# Patient Record
Sex: MALE | Race: WHITE | NOT HISPANIC OR LATINO | Employment: FULL TIME | URBAN - METROPOLITAN AREA
[De-identification: names, ages, dates, MRNs, and addresses within clinical notes are randomized per-mention and may not be internally consistent; named-entity substitution may affect disease eponyms.]

---

## 2017-07-18 ENCOUNTER — ALLSCRIPTS OFFICE VISIT (OUTPATIENT)
Dept: OTHER | Facility: OTHER | Age: 37
End: 2017-07-18

## 2017-11-07 ENCOUNTER — HOSPITAL ENCOUNTER (INPATIENT)
Facility: HOSPITAL | Age: 37
LOS: 7 days | Discharge: HOME | DRG: 853 | End: 2017-11-14
Attending: EMERGENCY MEDICINE | Admitting: SURGERY
Payer: COMMERCIAL

## 2017-11-07 ENCOUNTER — APPOINTMENT (INPATIENT)
Dept: RADIOLOGY | Facility: HOSPITAL | Age: 37
DRG: 853 | End: 2017-11-07
Payer: COMMERCIAL

## 2017-11-07 ENCOUNTER — APPOINTMENT (EMERGENCY)
Dept: RADIOLOGY | Facility: HOSPITAL | Age: 37
DRG: 853 | End: 2017-11-07
Payer: COMMERCIAL

## 2017-11-07 DIAGNOSIS — D72.829 LEUKOCYTOSIS: ICD-10-CM

## 2017-11-07 DIAGNOSIS — K83.9 BILE LEAK: ICD-10-CM

## 2017-11-07 DIAGNOSIS — K81.0 ACUTE CHOLECYSTITIS: ICD-10-CM

## 2017-11-07 DIAGNOSIS — R11.2 NAUSEA & VOMITING: ICD-10-CM

## 2017-11-07 DIAGNOSIS — R10.9 ABDOMINAL PAIN: Primary | ICD-10-CM

## 2017-11-07 LAB
ANION GAP BLD CALC-SCNC: 21 MMOL/L (ref 4–13)
APTT PPP: 31 SECONDS (ref 23–35)
BASOPHILS # BLD AUTO: 0.02 THOUSANDS/ΜL (ref 0–0.1)
BASOPHILS NFR BLD AUTO: 0 % (ref 0–1)
BUN BLD-MCNC: 12 MG/DL (ref 5–25)
CA-I BLD-SCNC: 1.12 MMOL/L (ref 1.12–1.32)
CHLORIDE BLD-SCNC: 102 MMOL/L (ref 100–108)
CREAT BLD-MCNC: 0.8 MG/DL (ref 0.6–1.3)
EOSINOPHIL # BLD AUTO: 0.01 THOUSAND/ΜL (ref 0–0.61)
EOSINOPHIL NFR BLD AUTO: 0 % (ref 0–6)
ERYTHROCYTE [DISTWIDTH] IN BLOOD BY AUTOMATED COUNT: 12.9 % (ref 11.6–15.1)
GFR SERPL CREATININE-BSD FRML MDRD: 113 ML/MIN/1.73SQ M
GLUCOSE SERPL-MCNC: 170 MG/DL (ref 65–140)
HCT VFR BLD AUTO: 43.9 % (ref 36.5–49.3)
HCT VFR BLD CALC: 47 % (ref 36.5–49.3)
HGB BLD-MCNC: 16.4 G/DL (ref 12–17)
HGB BLDA-MCNC: 16 G/DL (ref 12–17)
INR PPP: 1.25 (ref 0.86–1.16)
LACTATE SERPL-SCNC: 1.6 MMOL/L (ref 0.5–2)
LACTATE SERPL-SCNC: 2.6 MMOL/L (ref 0.5–2)
LIPASE SERPL-CCNC: 62 U/L (ref 73–393)
LYMPHOCYTES # BLD AUTO: 1.03 THOUSANDS/ΜL (ref 0.6–4.47)
LYMPHOCYTES NFR BLD AUTO: 4 % (ref 14–44)
MCH RBC QN AUTO: 31 PG (ref 26.8–34.3)
MCHC RBC AUTO-ENTMCNC: 37.4 G/DL (ref 31.4–37.4)
MCV RBC AUTO: 83 FL (ref 82–98)
MONOCYTES # BLD AUTO: 2.2 THOUSAND/ΜL (ref 0.17–1.22)
MONOCYTES NFR BLD AUTO: 9 % (ref 4–12)
NEUTROPHILS # BLD AUTO: 20.96 THOUSANDS/ΜL (ref 1.85–7.62)
NEUTS SEG NFR BLD AUTO: 87 % (ref 43–75)
NRBC BLD AUTO-RTO: 0 /100 WBCS
PCO2 BLD: 22 MMOL/L (ref 21–32)
PLATELET # BLD AUTO: 272 THOUSANDS/UL (ref 149–390)
PMV BLD AUTO: 11.4 FL (ref 8.9–12.7)
POTASSIUM BLD-SCNC: 3.5 MMOL/L (ref 3.5–5.3)
PROTHROMBIN TIME: 15.8 SECONDS (ref 12.1–14.4)
RBC # BLD AUTO: 5.29 MILLION/UL (ref 3.88–5.62)
SODIUM BLD-SCNC: 141 MMOL/L (ref 136–145)
SPECIMEN SOURCE: ABNORMAL
SPECIMEN SOURCE: NORMAL
TROPONIN I BLD-MCNC: 0 NG/ML (ref 0–0.08)
WBC # BLD AUTO: 24.32 THOUSAND/UL (ref 4.31–10.16)

## 2017-11-07 PROCEDURE — 85025 COMPLETE CBC W/AUTO DIFF WBC: CPT | Performed by: EMERGENCY MEDICINE

## 2017-11-07 PROCEDURE — 83605 ASSAY OF LACTIC ACID: CPT | Performed by: EMERGENCY MEDICINE

## 2017-11-07 PROCEDURE — 96361 HYDRATE IV INFUSION ADD-ON: CPT

## 2017-11-07 PROCEDURE — 84484 ASSAY OF TROPONIN QUANT: CPT

## 2017-11-07 PROCEDURE — 83690 ASSAY OF LIPASE: CPT | Performed by: EMERGENCY MEDICINE

## 2017-11-07 PROCEDURE — 71020 HB CHEST X-RAY 2VW FRONTAL&LATL: CPT

## 2017-11-07 PROCEDURE — 74177 CT ABD & PELVIS W/CONTRAST: CPT

## 2017-11-07 PROCEDURE — 80053 COMPREHEN METABOLIC PANEL: CPT | Performed by: EMERGENCY MEDICINE

## 2017-11-07 PROCEDURE — 85014 HEMATOCRIT: CPT

## 2017-11-07 PROCEDURE — 93005 ELECTROCARDIOGRAM TRACING: CPT | Performed by: EMERGENCY MEDICINE

## 2017-11-07 PROCEDURE — 85610 PROTHROMBIN TIME: CPT | Performed by: EMERGENCY MEDICINE

## 2017-11-07 PROCEDURE — 96374 THER/PROPH/DIAG INJ IV PUSH: CPT

## 2017-11-07 PROCEDURE — 96375 TX/PRO/DX INJ NEW DRUG ADDON: CPT

## 2017-11-07 PROCEDURE — 36415 COLL VENOUS BLD VENIPUNCTURE: CPT

## 2017-11-07 PROCEDURE — 87040 BLOOD CULTURE FOR BACTERIA: CPT | Performed by: EMERGENCY MEDICINE

## 2017-11-07 PROCEDURE — 85730 THROMBOPLASTIN TIME PARTIAL: CPT | Performed by: EMERGENCY MEDICINE

## 2017-11-07 PROCEDURE — 80047 BASIC METABLC PNL IONIZED CA: CPT

## 2017-11-07 RX ORDER — OXYCODONE HYDROCHLORIDE 10 MG/1
10 TABLET ORAL EVERY 4 HOURS PRN
Status: DISCONTINUED | OUTPATIENT
Start: 2017-11-07 | End: 2017-11-14 | Stop reason: HOSPADM

## 2017-11-07 RX ORDER — ONDANSETRON 2 MG/ML
4 INJECTION INTRAMUSCULAR; INTRAVENOUS ONCE
Status: COMPLETED | OUTPATIENT
Start: 2017-11-07 | End: 2017-11-07

## 2017-11-07 RX ORDER — ACETAMINOPHEN 325 MG/1
650 TABLET ORAL EVERY 6 HOURS PRN
Status: DISCONTINUED | OUTPATIENT
Start: 2017-11-07 | End: 2017-11-14 | Stop reason: HOSPADM

## 2017-11-07 RX ORDER — OXYCODONE HYDROCHLORIDE 5 MG/1
5 TABLET ORAL EVERY 4 HOURS PRN
Status: DISCONTINUED | OUTPATIENT
Start: 2017-11-07 | End: 2017-11-14 | Stop reason: HOSPADM

## 2017-11-07 RX ORDER — MORPHINE SULFATE 10 MG/ML
6 INJECTION, SOLUTION INTRAMUSCULAR; INTRAVENOUS ONCE
Status: COMPLETED | OUTPATIENT
Start: 2017-11-07 | End: 2017-11-07

## 2017-11-07 RX ORDER — ONDANSETRON 2 MG/ML
4 INJECTION INTRAMUSCULAR; INTRAVENOUS EVERY 6 HOURS PRN
Status: DISCONTINUED | OUTPATIENT
Start: 2017-11-07 | End: 2017-11-14 | Stop reason: HOSPADM

## 2017-11-07 RX ORDER — ACETAMINOPHEN 325 MG/1
975 TABLET ORAL ONCE
Status: COMPLETED | OUTPATIENT
Start: 2017-11-07 | End: 2017-11-07

## 2017-11-07 RX ORDER — SODIUM CHLORIDE, SODIUM LACTATE, POTASSIUM CHLORIDE, CALCIUM CHLORIDE 600; 310; 30; 20 MG/100ML; MG/100ML; MG/100ML; MG/100ML
75 INJECTION, SOLUTION INTRAVENOUS CONTINUOUS
Status: DISCONTINUED | OUTPATIENT
Start: 2017-11-08 | End: 2017-11-11

## 2017-11-07 RX ADMIN — SODIUM CHLORIDE 1000 ML: 0.9 INJECTION, SOLUTION INTRAVENOUS at 23:31

## 2017-11-07 RX ADMIN — ONDANSETRON 4 MG: 2 INJECTION INTRAMUSCULAR; INTRAVENOUS at 21:35

## 2017-11-07 RX ADMIN — ACETAMINOPHEN 975 MG: 325 TABLET, FILM COATED ORAL at 23:37

## 2017-11-07 RX ADMIN — SODIUM CHLORIDE 1000 ML: 0.9 INJECTION, SOLUTION INTRAVENOUS at 21:27

## 2017-11-07 RX ADMIN — IOHEXOL 100 ML: 350 INJECTION, SOLUTION INTRAVENOUS at 22:29

## 2017-11-07 RX ADMIN — MORPHINE SULFATE 6 MG: 10 INJECTION, SOLUTION INTRAMUSCULAR; INTRAVENOUS at 21:32

## 2017-11-08 ENCOUNTER — ANESTHESIA EVENT (INPATIENT)
Dept: PERIOP | Facility: HOSPITAL | Age: 37
DRG: 853 | End: 2017-11-08
Payer: COMMERCIAL

## 2017-11-08 ENCOUNTER — ANESTHESIA (INPATIENT)
Dept: PERIOP | Facility: HOSPITAL | Age: 37
DRG: 853 | End: 2017-11-08
Payer: COMMERCIAL

## 2017-11-08 ENCOUNTER — APPOINTMENT (INPATIENT)
Dept: RADIOLOGY | Facility: HOSPITAL | Age: 37
DRG: 853 | End: 2017-11-08
Payer: COMMERCIAL

## 2017-11-08 LAB
ABO GROUP BLD: NORMAL
ALBUMIN SERPL BCP-MCNC: 3.1 G/DL (ref 3.5–5)
ALBUMIN SERPL BCP-MCNC: 3.9 G/DL (ref 3.5–5)
ALP SERPL-CCNC: 50 U/L (ref 46–116)
ALP SERPL-CCNC: 56 U/L (ref 46–116)
ALT SERPL W P-5'-P-CCNC: 14 U/L (ref 12–78)
ALT SERPL W P-5'-P-CCNC: 17 U/L (ref 12–78)
ANION GAP SERPL CALCULATED.3IONS-SCNC: 12 MMOL/L (ref 4–13)
ANION GAP SERPL CALCULATED.3IONS-SCNC: 9 MMOL/L (ref 4–13)
AST SERPL W P-5'-P-CCNC: 13 U/L (ref 5–45)
AST SERPL W P-5'-P-CCNC: 14 U/L (ref 5–45)
ATRIAL RATE: 118 BPM
BACTERIA UR QL AUTO: ABNORMAL /HPF
BASOPHILS # BLD AUTO: 0.02 THOUSANDS/ΜL (ref 0–0.1)
BASOPHILS NFR BLD AUTO: 0 % (ref 0–1)
BILIRUB SERPL-MCNC: 1.61 MG/DL (ref 0.2–1)
BILIRUB SERPL-MCNC: 1.65 MG/DL (ref 0.2–1)
BILIRUB UR QL STRIP: ABNORMAL
BLD GP AB SCN SERPL QL: NEGATIVE
BUN SERPL-MCNC: 11 MG/DL (ref 5–25)
BUN SERPL-MCNC: 12 MG/DL (ref 5–25)
CALCIUM SERPL-MCNC: 8.6 MG/DL (ref 8.3–10.1)
CALCIUM SERPL-MCNC: 9.1 MG/DL (ref 8.3–10.1)
CHLORIDE SERPL-SCNC: 105 MMOL/L (ref 100–108)
CHLORIDE SERPL-SCNC: 105 MMOL/L (ref 100–108)
CLARITY UR: CLEAR
CO2 SERPL-SCNC: 22 MMOL/L (ref 21–32)
CO2 SERPL-SCNC: 26 MMOL/L (ref 21–32)
COLOR UR: ABNORMAL
CREAT SERPL-MCNC: 0.99 MG/DL (ref 0.6–1.3)
CREAT SERPL-MCNC: 1.04 MG/DL (ref 0.6–1.3)
EOSINOPHIL # BLD AUTO: 0 THOUSAND/ΜL (ref 0–0.61)
EOSINOPHIL NFR BLD AUTO: 0 % (ref 0–6)
ERYTHROCYTE [DISTWIDTH] IN BLOOD BY AUTOMATED COUNT: 13 % (ref 11.6–15.1)
GFR SERPL CREATININE-BSD FRML MDRD: 90 ML/MIN/1.73SQ M
GFR SERPL CREATININE-BSD FRML MDRD: 96 ML/MIN/1.73SQ M
GLUCOSE SERPL-MCNC: 129 MG/DL (ref 65–140)
GLUCOSE SERPL-MCNC: 158 MG/DL (ref 65–140)
GLUCOSE UR STRIP-MCNC: NEGATIVE MG/DL
HCT VFR BLD AUTO: 42.5 % (ref 36.5–49.3)
HGB BLD-MCNC: 15.3 G/DL (ref 12–17)
HGB UR QL STRIP.AUTO: NEGATIVE
HYALINE CASTS #/AREA URNS LPF: ABNORMAL /LPF
KETONES UR STRIP-MCNC: NEGATIVE MG/DL
LACTATE SERPL-SCNC: 1.7 MMOL/L (ref 0.5–2)
LEUKOCYTE ESTERASE UR QL STRIP: ABNORMAL
LYMPHOCYTES # BLD AUTO: 1.07 THOUSANDS/ΜL (ref 0.6–4.47)
LYMPHOCYTES NFR BLD AUTO: 5 % (ref 14–44)
MCH RBC QN AUTO: 30.7 PG (ref 26.8–34.3)
MCHC RBC AUTO-ENTMCNC: 36 G/DL (ref 31.4–37.4)
MCV RBC AUTO: 85 FL (ref 82–98)
MONOCYTES # BLD AUTO: 1.62 THOUSAND/ΜL (ref 0.17–1.22)
MONOCYTES NFR BLD AUTO: 7 % (ref 4–12)
NEUTROPHILS # BLD AUTO: 19.73 THOUSANDS/ΜL (ref 1.85–7.62)
NEUTS SEG NFR BLD AUTO: 88 % (ref 43–75)
NITRITE UR QL STRIP: NEGATIVE
NON-SQ EPI CELLS URNS QL MICRO: ABNORMAL /HPF
NRBC BLD AUTO-RTO: 0 /100 WBCS
P AXIS: 60 DEGREES
PH UR STRIP.AUTO: 6 [PH] (ref 4.5–8)
PLATELET # BLD AUTO: 203 THOUSANDS/UL (ref 149–390)
PMV BLD AUTO: 11.5 FL (ref 8.9–12.7)
POTASSIUM SERPL-SCNC: 3.6 MMOL/L (ref 3.5–5.3)
POTASSIUM SERPL-SCNC: 4 MMOL/L (ref 3.5–5.3)
PR INTERVAL: 180 MS
PROT SERPL-MCNC: 7.1 G/DL (ref 6.4–8.2)
PROT SERPL-MCNC: 8.4 G/DL (ref 6.4–8.2)
PROT UR STRIP-MCNC: ABNORMAL MG/DL
QRS AXIS: 47 DEGREES
QRSD INTERVAL: 80 MS
QT INTERVAL: 314 MS
QTC INTERVAL: 440 MS
RBC # BLD AUTO: 4.98 MILLION/UL (ref 3.88–5.62)
RBC #/AREA URNS AUTO: ABNORMAL /HPF
RH BLD: POSITIVE
SODIUM SERPL-SCNC: 139 MMOL/L (ref 136–145)
SODIUM SERPL-SCNC: 140 MMOL/L (ref 136–145)
SP GR UR STRIP.AUTO: >1.045 (ref 1–1.03)
SPECIMEN EXPIRATION DATE: NORMAL
T WAVE AXIS: 59 DEGREES
UROBILINOGEN UR QL STRIP.AUTO: 2 E.U./DL
VENTRICULAR RATE: 118 BPM
WBC # BLD AUTO: 22.55 THOUSAND/UL (ref 4.31–10.16)
WBC #/AREA URNS AUTO: ABNORMAL /HPF

## 2017-11-08 PROCEDURE — 87075 CULTR BACTERIA EXCEPT BLOOD: CPT | Performed by: SURGERY

## 2017-11-08 PROCEDURE — 81001 URINALYSIS AUTO W/SCOPE: CPT | Performed by: EMERGENCY MEDICINE

## 2017-11-08 PROCEDURE — 86901 BLOOD TYPING SEROLOGIC RH(D): CPT | Performed by: SURGERY

## 2017-11-08 PROCEDURE — 71010 HB CHEST X-RAY 1 VIEW FRONTAL (PORTABLE): CPT

## 2017-11-08 PROCEDURE — 0DNU0ZZ RELEASE OMENTUM, OPEN APPROACH: ICD-10-PCS | Performed by: SURGERY

## 2017-11-08 PROCEDURE — 86900 BLOOD TYPING SEROLOGIC ABO: CPT | Performed by: SURGERY

## 2017-11-08 PROCEDURE — 87147 CULTURE TYPE IMMUNOLOGIC: CPT | Performed by: SURGERY

## 2017-11-08 PROCEDURE — 0FJ44ZZ INSPECTION OF GALLBLADDER, PERCUTANEOUS ENDOSCOPIC APPROACH: ICD-10-PCS | Performed by: SURGERY

## 2017-11-08 PROCEDURE — 85025 COMPLETE CBC W/AUTO DIFF WBC: CPT | Performed by: SURGERY

## 2017-11-08 PROCEDURE — 87070 CULTURE OTHR SPECIMN AEROBIC: CPT | Performed by: SURGERY

## 2017-11-08 PROCEDURE — 0WQF0ZZ REPAIR ABDOMINAL WALL, OPEN APPROACH: ICD-10-PCS | Performed by: SURGERY

## 2017-11-08 PROCEDURE — 0FB40ZZ EXCISION OF GALLBLADDER, OPEN APPROACH: ICD-10-PCS | Performed by: SURGERY

## 2017-11-08 PROCEDURE — 94762 N-INVAS EAR/PLS OXIMTRY CONT: CPT

## 2017-11-08 PROCEDURE — 99285 EMERGENCY DEPT VISIT HI MDM: CPT

## 2017-11-08 PROCEDURE — 80053 COMPREHEN METABOLIC PANEL: CPT | Performed by: SURGERY

## 2017-11-08 PROCEDURE — 87205 SMEAR GRAM STAIN: CPT | Performed by: SURGERY

## 2017-11-08 PROCEDURE — 88304 TISSUE EXAM BY PATHOLOGIST: CPT | Performed by: SURGERY

## 2017-11-08 PROCEDURE — 86850 RBC ANTIBODY SCREEN: CPT | Performed by: SURGERY

## 2017-11-08 PROCEDURE — 0F9800Z DRAINAGE OF CYSTIC DUCT WITH DRAINAGE DEVICE, OPEN APPROACH: ICD-10-PCS | Performed by: SURGERY

## 2017-11-08 PROCEDURE — 83605 ASSAY OF LACTIC ACID: CPT | Performed by: SURGERY

## 2017-11-08 RX ORDER — ALBUMIN, HUMAN INJ 5% 5 %
SOLUTION INTRAVENOUS CONTINUOUS PRN
Status: DISCONTINUED | OUTPATIENT
Start: 2017-11-08 | End: 2017-11-08 | Stop reason: SURG

## 2017-11-08 RX ORDER — SODIUM CHLORIDE, SODIUM LACTATE, POTASSIUM CHLORIDE, CALCIUM CHLORIDE 600; 310; 30; 20 MG/100ML; MG/100ML; MG/100ML; MG/100ML
100 INJECTION, SOLUTION INTRAVENOUS CONTINUOUS
Status: DISCONTINUED | OUTPATIENT
Start: 2017-11-08 | End: 2017-11-08

## 2017-11-08 RX ORDER — FENTANYL CITRATE 50 UG/ML
INJECTION, SOLUTION INTRAMUSCULAR; INTRAVENOUS AS NEEDED
Status: DISCONTINUED | OUTPATIENT
Start: 2017-11-08 | End: 2017-11-08 | Stop reason: SURG

## 2017-11-08 RX ORDER — FENTANYL CITRATE/PF 50 MCG/ML
25 SYRINGE (ML) INJECTION
Status: DISCONTINUED | OUTPATIENT
Start: 2017-11-08 | End: 2017-11-08 | Stop reason: HOSPADM

## 2017-11-08 RX ORDER — SUCCINYLCHOLINE CHLORIDE 20 MG/ML
INJECTION INTRAMUSCULAR; INTRAVENOUS AS NEEDED
Status: DISCONTINUED | OUTPATIENT
Start: 2017-11-08 | End: 2017-11-08 | Stop reason: SURG

## 2017-11-08 RX ORDER — PROPOFOL 10 MG/ML
INJECTION, EMULSION INTRAVENOUS AS NEEDED
Status: DISCONTINUED | OUTPATIENT
Start: 2017-11-08 | End: 2017-11-08 | Stop reason: SURG

## 2017-11-08 RX ORDER — CIPROFLOXACIN 2 MG/ML
400 INJECTION, SOLUTION INTRAVENOUS EVERY 12 HOURS
Status: COMPLETED | OUTPATIENT
Start: 2017-11-08 | End: 2017-11-13

## 2017-11-08 RX ORDER — ROCURONIUM BROMIDE 10 MG/ML
INJECTION, SOLUTION INTRAVENOUS AS NEEDED
Status: DISCONTINUED | OUTPATIENT
Start: 2017-11-08 | End: 2017-11-08 | Stop reason: SURG

## 2017-11-08 RX ORDER — METHOCARBAMOL 500 MG/1
500 TABLET, FILM COATED ORAL EVERY 6 HOURS SCHEDULED
Status: DISCONTINUED | OUTPATIENT
Start: 2017-11-08 | End: 2017-11-14 | Stop reason: HOSPADM

## 2017-11-08 RX ORDER — PROMETHAZINE HYDROCHLORIDE 25 MG/ML
12.5 INJECTION, SOLUTION INTRAMUSCULAR; INTRAVENOUS ONCE AS NEEDED
Status: DISCONTINUED | OUTPATIENT
Start: 2017-11-08 | End: 2017-11-08 | Stop reason: HOSPADM

## 2017-11-08 RX ORDER — LIDOCAINE 50 MG/G
1 PATCH TOPICAL DAILY
Status: DISCONTINUED | OUTPATIENT
Start: 2017-11-08 | End: 2017-11-13 | Stop reason: SDUPTHER

## 2017-11-08 RX ORDER — MIDAZOLAM HYDROCHLORIDE 1 MG/ML
INJECTION INTRAMUSCULAR; INTRAVENOUS AS NEEDED
Status: DISCONTINUED | OUTPATIENT
Start: 2017-11-08 | End: 2017-11-08 | Stop reason: SURG

## 2017-11-08 RX ORDER — LIDOCAINE HYDROCHLORIDE 10 MG/ML
INJECTION, SOLUTION INFILTRATION; PERINEURAL AS NEEDED
Status: DISCONTINUED | OUTPATIENT
Start: 2017-11-08 | End: 2017-11-08 | Stop reason: SURG

## 2017-11-08 RX ORDER — ONDANSETRON 2 MG/ML
4 INJECTION INTRAMUSCULAR; INTRAVENOUS ONCE AS NEEDED
Status: DISCONTINUED | OUTPATIENT
Start: 2017-11-08 | End: 2017-11-08 | Stop reason: HOSPADM

## 2017-11-08 RX ORDER — MAGNESIUM HYDROXIDE 1200 MG/15ML
LIQUID ORAL AS NEEDED
Status: DISCONTINUED | OUTPATIENT
Start: 2017-11-08 | End: 2017-11-08 | Stop reason: HOSPADM

## 2017-11-08 RX ORDER — ONDANSETRON 2 MG/ML
INJECTION INTRAMUSCULAR; INTRAVENOUS AS NEEDED
Status: DISCONTINUED | OUTPATIENT
Start: 2017-11-08 | End: 2017-11-08 | Stop reason: SURG

## 2017-11-08 RX ORDER — CLINDAMYCIN PHOSPHATE 900 MG/50ML
900 INJECTION INTRAVENOUS
Status: COMPLETED | OUTPATIENT
Start: 2017-11-08 | End: 2017-11-08

## 2017-11-08 RX ORDER — ESMOLOL HYDROCHLORIDE 10 MG/ML
INJECTION INTRAVENOUS AS NEEDED
Status: DISCONTINUED | OUTPATIENT
Start: 2017-11-08 | End: 2017-11-08 | Stop reason: SURG

## 2017-11-08 RX ORDER — SODIUM CHLORIDE 9 MG/ML
INJECTION, SOLUTION INTRAVENOUS CONTINUOUS PRN
Status: DISCONTINUED | OUTPATIENT
Start: 2017-11-08 | End: 2017-11-08

## 2017-11-08 RX ORDER — ROPIVACAINE HYDROCHLORIDE 5 MG/ML
INJECTION, SOLUTION EPIDURAL; INFILTRATION; PERINEURAL AS NEEDED
Status: DISCONTINUED | OUTPATIENT
Start: 2017-11-08 | End: 2017-11-08 | Stop reason: SURG

## 2017-11-08 RX ORDER — METOCLOPRAMIDE HYDROCHLORIDE 5 MG/ML
INJECTION INTRAMUSCULAR; INTRAVENOUS AS NEEDED
Status: DISCONTINUED | OUTPATIENT
Start: 2017-11-08 | End: 2017-11-08 | Stop reason: SURG

## 2017-11-08 RX ORDER — SODIUM CHLORIDE 9 MG/ML
INJECTION, SOLUTION INTRAVENOUS CONTINUOUS PRN
Status: DISCONTINUED | OUTPATIENT
Start: 2017-11-08 | End: 2017-11-08 | Stop reason: SURG

## 2017-11-08 RX ORDER — METOCLOPRAMIDE HYDROCHLORIDE 5 MG/ML
10 INJECTION INTRAMUSCULAR; INTRAVENOUS ONCE AS NEEDED
Status: DISCONTINUED | OUTPATIENT
Start: 2017-11-08 | End: 2017-11-08 | Stop reason: HOSPADM

## 2017-11-08 RX ORDER — GLYCOPYRROLATE 0.2 MG/ML
INJECTION INTRAMUSCULAR; INTRAVENOUS AS NEEDED
Status: DISCONTINUED | OUTPATIENT
Start: 2017-11-08 | End: 2017-11-08 | Stop reason: SURG

## 2017-11-08 RX ADMIN — PROPOFOL 250 MG: 10 INJECTION, EMULSION INTRAVENOUS at 12:08

## 2017-11-08 RX ADMIN — METOCLOPRAMIDE 10 MG: 5 INJECTION, SOLUTION INTRAMUSCULAR; INTRAVENOUS at 12:15

## 2017-11-08 RX ADMIN — SODIUM CHLORIDE, SODIUM LACTATE, POTASSIUM CHLORIDE, AND CALCIUM CHLORIDE 125 ML/HR: .6; .31; .03; .02 INJECTION, SOLUTION INTRAVENOUS at 01:03

## 2017-11-08 RX ADMIN — FENTANYL CITRATE 50 MCG: 50 INJECTION, SOLUTION INTRAMUSCULAR; INTRAVENOUS at 12:04

## 2017-11-08 RX ADMIN — SUCCINYLCHOLINE CHLORIDE 140 MG: 20 INJECTION, SOLUTION INTRAMUSCULAR; INTRAVENOUS at 12:11

## 2017-11-08 RX ADMIN — CEFAZOLIN SODIUM 2000 MG: 2 SOLUTION INTRAVENOUS at 00:44

## 2017-11-08 RX ADMIN — ONDANSETRON 4 MG: 2 INJECTION INTRAMUSCULAR; INTRAVENOUS at 11:53

## 2017-11-08 RX ADMIN — CIPROFLOXACIN 400 MG: 2 INJECTION, SOLUTION INTRAVENOUS at 20:08

## 2017-11-08 RX ADMIN — CLINDAMYCIN PHOSPHATE 900 MG: 18 INJECTION, SOLUTION INTRAMUSCULAR; INTRAVENOUS at 12:00

## 2017-11-08 RX ADMIN — FENTANYL CITRATE 50 MCG: 50 INJECTION, SOLUTION INTRAMUSCULAR; INTRAVENOUS at 12:06

## 2017-11-08 RX ADMIN — DEXAMETHASONE SODIUM PHOSPHATE 10 MG: 10 INJECTION INTRAMUSCULAR; INTRAVENOUS at 12:15

## 2017-11-08 RX ADMIN — CIPROFLOXACIN 400 MG: 2 INJECTION, SOLUTION INTRAVENOUS at 08:14

## 2017-11-08 RX ADMIN — SODIUM CHLORIDE, SODIUM LACTATE, POTASSIUM CHLORIDE, AND CALCIUM CHLORIDE 1000 ML: .6; .31; .03; .02 INJECTION, SOLUTION INTRAVENOUS at 10:01

## 2017-11-08 RX ADMIN — FENTANYL CITRATE 50 MCG: 50 INJECTION, SOLUTION INTRAMUSCULAR; INTRAVENOUS at 13:28

## 2017-11-08 RX ADMIN — HYDROMORPHONE HYDROCHLORIDE 0.5 MG: 1 INJECTION, SOLUTION INTRAMUSCULAR; INTRAVENOUS; SUBCUTANEOUS at 07:35

## 2017-11-08 RX ADMIN — ESMOLOL HYDROCHLORIDE 30 MG: 100 INJECTION, SOLUTION INTRAVENOUS at 12:29

## 2017-11-08 RX ADMIN — SODIUM CHLORIDE, SODIUM LACTATE, POTASSIUM CHLORIDE, AND CALCIUM CHLORIDE: .6; .31; .03; .02 INJECTION, SOLUTION INTRAVENOUS at 13:27

## 2017-11-08 RX ADMIN — ALBUMIN HUMAN: 0.05 INJECTION, SOLUTION INTRAVENOUS at 12:59

## 2017-11-08 RX ADMIN — ROCURONIUM BROMIDE 30 MG: 10 INJECTION INTRAVENOUS at 12:10

## 2017-11-08 RX ADMIN — LIDOCAINE 1 PATCH: 50 PATCH CUTANEOUS at 17:26

## 2017-11-08 RX ADMIN — METHOCARBAMOL 500 MG: 500 TABLET ORAL at 17:26

## 2017-11-08 RX ADMIN — METRONIDAZOLE 500 MG: 500 INJECTION, SOLUTION INTRAVENOUS at 13:51

## 2017-11-08 RX ADMIN — HYDROMORPHONE HYDROCHLORIDE 0.5 MG: 1 INJECTION, SOLUTION INTRAMUSCULAR; INTRAVENOUS; SUBCUTANEOUS at 15:02

## 2017-11-08 RX ADMIN — LIDOCAINE HYDROCHLORIDE 100 MG: 10 INJECTION, SOLUTION INFILTRATION; PERINEURAL at 12:08

## 2017-11-08 RX ADMIN — ROPIVACAINE HYDROCHLORIDE 30 ML: 5 INJECTION, SOLUTION EPIDURAL; INFILTRATION; PERINEURAL at 14:44

## 2017-11-08 RX ADMIN — OXYCODONE HYDROCHLORIDE 5 MG: 5 TABLET ORAL at 05:13

## 2017-11-08 RX ADMIN — FENTANYL CITRATE 25 MCG: 50 INJECTION, SOLUTION INTRAMUSCULAR; INTRAVENOUS at 15:50

## 2017-11-08 RX ADMIN — FENTANYL CITRATE 50 MCG: 50 INJECTION, SOLUTION INTRAMUSCULAR; INTRAVENOUS at 13:06

## 2017-11-08 RX ADMIN — SODIUM CHLORIDE, SODIUM LACTATE, POTASSIUM CHLORIDE, AND CALCIUM CHLORIDE: .6; .31; .03; .02 INJECTION, SOLUTION INTRAVENOUS at 11:55

## 2017-11-08 RX ADMIN — GLYCOPYRROLATE 0.2 MG: 0.2 INJECTION, SOLUTION INTRAMUSCULAR; INTRAVENOUS at 11:53

## 2017-11-08 RX ADMIN — HYDROMORPHONE HYDROCHLORIDE 0.5 MG: 1 INJECTION, SOLUTION INTRAMUSCULAR; INTRAVENOUS; SUBCUTANEOUS at 14:16

## 2017-11-08 RX ADMIN — METRONIDAZOLE 500 MG: 500 INJECTION, SOLUTION INTRAVENOUS at 17:26

## 2017-11-08 RX ADMIN — ROCURONIUM BROMIDE 30 MG: 10 INJECTION INTRAVENOUS at 12:40

## 2017-11-08 RX ADMIN — METRONIDAZOLE 500 MG: 500 INJECTION, SOLUTION INTRAVENOUS at 23:58

## 2017-11-08 RX ADMIN — FENTANYL CITRATE 25 MCG: 50 INJECTION, SOLUTION INTRAMUSCULAR; INTRAVENOUS at 15:12

## 2017-11-08 RX ADMIN — METHOCARBAMOL 500 MG: 500 TABLET ORAL at 23:21

## 2017-11-08 RX ADMIN — GLYCOPYRROLATE 0.8 MG: 0.2 INJECTION, SOLUTION INTRAMUSCULAR; INTRAVENOUS at 14:47

## 2017-11-08 RX ADMIN — ESMOLOL HYDROCHLORIDE 30 MG: 100 INJECTION, SOLUTION INTRAVENOUS at 14:47

## 2017-11-08 RX ADMIN — FENTANYL CITRATE 25 MCG: 50 INJECTION, SOLUTION INTRAMUSCULAR; INTRAVENOUS at 15:09

## 2017-11-08 RX ADMIN — SODIUM CHLORIDE: 0.9 INJECTION, SOLUTION INTRAVENOUS at 12:17

## 2017-11-08 RX ADMIN — HYDROMORPHONE HYDROCHLORIDE 0.5 MG: 1 INJECTION, SOLUTION INTRAMUSCULAR; INTRAVENOUS; SUBCUTANEOUS at 01:02

## 2017-11-08 RX ADMIN — FENTANYL CITRATE 25 MCG: 50 INJECTION, SOLUTION INTRAMUSCULAR; INTRAVENOUS at 15:41

## 2017-11-08 RX ADMIN — SODIUM CHLORIDE 1000 ML: 0.9 INJECTION, SOLUTION INTRAVENOUS at 00:44

## 2017-11-08 RX ADMIN — SODIUM CHLORIDE: 0.9 INJECTION, SOLUTION INTRAVENOUS at 12:45

## 2017-11-08 RX ADMIN — METRONIDAZOLE 500 MG: 500 INJECTION, SOLUTION INTRAVENOUS at 07:37

## 2017-11-08 RX ADMIN — SODIUM CHLORIDE, SODIUM LACTATE, POTASSIUM CHLORIDE, AND CALCIUM CHLORIDE 125 ML/HR: .6; .31; .03; .02 INJECTION, SOLUTION INTRAVENOUS at 11:05

## 2017-11-08 RX ADMIN — METRONIDAZOLE 500 MG: 500 INJECTION, SOLUTION INTRAVENOUS at 01:08

## 2017-11-08 RX ADMIN — MIDAZOLAM HYDROCHLORIDE 2 MG: 1 INJECTION, SOLUTION INTRAMUSCULAR; INTRAVENOUS at 11:53

## 2017-11-08 RX ADMIN — NEOSTIGMINE METHYLSULFATE 4 MG: 1 INJECTION, SOLUTION INTRAMUSCULAR; INTRAVENOUS; SUBCUTANEOUS at 14:47

## 2017-11-08 NOTE — ANESTHESIA PREPROCEDURE EVALUATION
Review of Systems/Medical History  Patient summary reviewed  Chart reviewed  No history of anesthetic complications     Cardiovascular  Negative cardio ROS Exercise tolerance: good,     Pulmonary  Negative pulmonary ROS ,        GI/Hepatic  Negative GI/hepatic ROS          Negative  ROS        Endo/Other  Negative endo/other ROS      GYN  Negative gynecology ROS          Hematology  Negative hematology ROS      Musculoskeletal  Negative musculoskeletal ROS        Neurology  Negative neurology ROS      Psychology   Negative psychology ROS            Physical Exam    Airway    Mallampati score: I    Neck ROM: full     Dental   No notable dental hx     Cardiovascular  Comment: Negative ROS,     Pulmonary      Other Findings        Anesthesia Plan  ASA Score- 1 Emergent      Anesthesia Type- general with ASA Monitors  Additional Monitors:   Airway Plan: ETT  Comment: TAP block if open surgery is necessary  Induction- intravenous  Informed Consent- Anesthetic plan and risks discussed with patient  I personally reviewed this patient with the CRNA  Discussed and agreed on the Anesthesia Plan with the CRNA  Neeraj Olivier

## 2017-11-08 NOTE — PLAN OF CARE
DISCHARGE PLANNING     Discharge to home or other facility with appropriate resources Progressing        GASTROINTESTINAL - ADULT     Minimal or absence of nausea and/or vomiting Progressing     Maintains or returns to baseline bowel function Progressing     Maintains adequate nutritional intake Progressing        INFECTION - ADULT     Absence or prevention of progression during hospitalization Progressing        PAIN - ADULT     Verbalizes/displays adequate comfort level or baseline comfort level Progressing        SAFETY ADULT     Patient will remain free of falls Progressing     Maintain or return to baseline ADL function Progressing

## 2017-11-08 NOTE — ED ATTENDING ATTESTATION
Hernandez Hollis MD, saw and evaluated the patient  All available labs and X-rays were ordered by me or the resident and have been reviewed by myself  I discussed the patient with the resident / non-physician and agree with the resident's / non-physician practitioner's findings and plan as documented in the resident's / non-physician practicitioner's note, except where noted  At this point, I agree with the current assessment done in the ED  Chief Complaint   Patient presents with    Chest Pain     per patient, "sunday I staretd not feeling good, my belly wasn't feeling good  and then it went away, then monday i started not feeling good again and had a hard time sleeping last night  Woke up this morning, threw up at work, went home and my chest hurts  i have a hard time catching a full breath " denies n/v/d   Abdominal Pain     This is a 42year old male presenting for likely surgical belly pain  He noted that on Sunday he started to not feel well  Monday had felt worse with diffuse belly pain  He went to work and couldn't tolerate it toda, vomiting once  Poor oral intake / anorexia  +nausea  +vomiting  The pain is worse when going over speed bumps (brought up by patient himself)  Denies any urinary tract infection symptoms (burning, itching, pain, blood, frequency)  Denies any upper respiratory tract infection symptoms (cough, congestion, rhinorrhea, sore throat)  Denies similar in the past  No sick contacts  PE:  Vitals:    11/10/17 1356 11/10/17 1402 11/10/17 1421 11/10/17 1500   BP: 154/88 146/86 146/89 124/76   Pulse: 90 88 81 77   Resp: 16 16 18 18   Temp: 98 9 °F (37 2 °C)  98 9 °F (37 2 °C) 98 6 °F (37 °C)   TempSrc: Tympanic  Oral Oral   SpO2: 94% 93% 96% 97%   Weight:       Height:       General: VS reviewed  Appears in NAD  awake, alert  Well-nourished, well-developed  Appears stated age  Speaking normally in full sentences  Head: Normocephalic, atraumatic, nontender    Eyes: EOM-I  No diplopia  No hyphema  No subconjunctival hemorrhages  Symmetrical lids  ENT: Atraumatic external nose and ears  MMM  No malocclusion  No stridor  Normal phonation  No drooling  Normal swallowing  Neck: No JVD  CV: No pallor noted  Peripheral pulses +2 throughout  No chest wall tenderness  Lungs:   No tachypnea  No respiratory distress  Abd: severe tenderness diffusely in abdomen, maximal in the RUQ  +psoas  +obturator sign  +heel strike sign  No CVAT  Very firm abdomen  MSK:   FROM   Skin: Dry, intact  Neuro: Awake, alert, GCS15, CN II-XII grossly intact  Motor grossly intact  Psychiatric/Behavioral: Appropriate mood and affect   Exam: deferred  A:  - Belly pain  P:  - Not surgical belly but very tender with rebound, unclear source  - Will do CT  - bedside u/s for cholecystitis  - dispo: surgical eval  - 13 point ROS was performed and all are normal unless stated in the history above  - Nursing note reviewed  Vitals reviewed  - Orders placed by myself and/or advanced practitioner / resident     - Previous chart was not reviewed  - No language barrier    - History obtained from patient  - There are no limitations to the history obtained  - Critical care time: Not applicable for this patient  Final Diagnosis:  1  Abdominal pain    2  Leukocytosis    3  Acute cholecystitis    4  Nausea & vomiting    5  Bile leak        ED Course as of Nov 10 1927   Tue Nov 07, 2017 2124 WBC: (!) 24 32   2143 I did a bedside U/S  I didn't get the greatest views however the GB looks enlarged, +wall thickening (0 6cm), and small amount of monica-cholecystic fluid  Will still do imaging b/c I didn't get good views  2145 Red surgical resident paged  Aware of patient  [facility]  Imaging pending  2331 LACTIC ACID: (!!) 2 6   2349 Discussed with surgery  Metronidazole + ancef  Likely surgery tomorrow         Medications   lactated ringers infusion ( Intravenous Anesthesia Volume Adjustment 11/10/17 1333)   ondansetron (ZOFRAN) injection 4 mg ( Intravenous MAR Unhold 11/8/17 1436)   HYDROmorphone (DILAUDID) 1 mg/mL injection 0 5 mg ( Intravenous MAR Unhold 11/8/17 1436)   oxyCODONE (ROXICODONE) IR tablet 5 mg (5 mg Oral Given 11/9/17 2005)   oxyCODONE (ROXICODONE) immediate release tablet 10 mg ( Oral MAR Unhold 11/8/17 1436)   acetaminophen (TYLENOL) tablet 650 mg ( Oral MAR Unhold 11/8/17 1436)   metroNIDAZOLE (FLAGYL) IVPB (premix) 500 mg (500 mg Intravenous New Bag 11/10/17 1650)   ciprofloxacin (CIPRO) IVPB (premix) 400 mg (400 mg Intravenous New Bag 11/10/17 1018)   lidocaine (LIDODERM) 5 % patch 1 patch (1 patch Transdermal Medication Applied 11/10/17 0823)   methocarbamol (ROBAXIN) tablet 500 mg (500 mg Oral Given 11/10/17 1851)   morphine (PF) 10 mg/mL injection 6 mg (6 mg Intravenous Given 11/7/17 2132)   ondansetron (ZOFRAN) injection 4 mg (4 mg Intravenous Given 11/7/17 2135)   sodium chloride 0 9 % bolus 1,000 mL (0 mL Intravenous Stopped 11/7/17 2327)   acetaminophen (TYLENOL) tablet 975 mg (975 mg Oral Given 11/7/17 2337)   iohexol (OMNIPAQUE) 350 MG/ML injection (MULTI-DOSE) 100 mL (100 mL Intravenous Given 11/7/17 2229)   sodium chloride 0 9 % bolus 1,000 mL (0 mL Intravenous Stopped 11/8/17 0100)   ceFAZolin (ANCEF) IVPB (premix) 2,000 mg (0 mg Intravenous Stopped 11/8/17 0204)   sodium chloride 0 9 % bolus 1,000 mL (0 mL Intravenous Stopped 11/8/17 1900)   clindamycin (CLEOCIN) IVPB (premix) 900 mg (900 mg Intravenous Given 11/8/17 1200)   lactated ringers bolus 1,000 mL (0 mL Intravenous Stopped 11/8/17 1900)   potassium chloride (K-DUR,KLOR-CON) CR tablet 40 mEq (40 mEq Oral Given 11/9/17 0904)     FL ERCP biliary only   Final Result      Please see procedure report for further details  Workstation performed: [id number]         XR chest portable   Final Result      Study somewhat limited by suboptimal inspiration  No acute pulmonary disease identified           Workstation performed: [id number]         X-ray chest 2 views   Final Result      No active pulmonary disease  Workstation performed: [id number]         CT abdomen pelvis w contrast   ED Interpretation   CT ordered by myself and the report from radiologist has been reviewed  Final Result         1  Findings suspicious for acute cholecystitis with inflammation extending into the mesentery  Moderate amount of ascites within the pelvis and tracking the paracolic gutters is noted  2  Mild wall thickening of the hepatic flexure and gastric antrum, likely reactive changes related to inflammation involving the gallbladder                 ##cfslh   I personally discussed this result with Dr Adelaida Ta on 11/7/2017 11:27 PM    ##         Workstation performed: [id number]           Orders Placed This Encounter   Procedures    Blood culture #1    Blood culture #2    Anaerobic culture and Gram stain    Body fluid culture and Gram stain    X-ray chest 2 views    CT abdomen pelvis w contrast    XR chest portable    FL ERCP biliary only    Comprehensive metabolic panel    CBC and differential    CBC and differential    Comprehensive metabolic panel    Lipase    APTT    Protime-INR    Lactic Acid x2    UA w Reflex to Microscopic w Reflex to Culture    Lactic acid, plasma    CBC and differential    Comprehensive metabolic panel    Urine Microscopic    CBC and differential    Comprehensive metabolic panel    Comprehensive metabolic panel    CBC and differential    Comprehensive metabolic panel    CBC and differential    CBC and differential    Comprehensive metabolic panel    Comprehensive metabolic panel    CBC and differential    Magnesium    Diet Surgical; Cl Liq Toast Crax; Cl Liq Toast Crax    POCT troponin    Notify physician if BP < 90    POCT chem 8 (CGB8)    Vital signs per unit routine    Notify physician    Up as tolerated    I/O    Maintain IV access    Encourage deep breathing and coughing    Incentive spirometry    Place sequential compression device    Drain to bulb suction    Intake and Output    Nursing Communication Maintain mac catheter, do NOT follow nurse driven protocol   Discontinue urinary catheter    Level 1-Full Code: all life saving measures are indicated    Inpatient consult to gastroenterology    Dietary nutrition supplements    EKG RESULTS    ECG 12 lead    ECG 12 lead    Type and screen    Insert peripheral IV    Inpatient Admission    Transfer patient    Transfer patient    Update level of care     Labs Reviewed   COMPREHENSIVE METABOLIC PANEL - Abnormal        Result Value Ref Range Status    Glucose 158 (*) 65 - 140 mg/dL Final    Comment:   If the patient is fasting, the ADA then defines impaired fasting glucose as > 100 mg/dL and diabetes as > or equal to 123 mg/dL  Specimen collection should occur prior to Sulfasalazine administration due to the potential for falsely depressed results  Specimen collection should occur prior to Sulfapyridine administration due to the potential for falsely elevated results  Total Protein 8 4 (*) 6 4 - 8 2 g/dL Final    Total Bilirubin 1 65 (*) 0 20 - 1 00 mg/dL Final    Sodium 139  136 - 145 mmol/L Final    Potassium 3 6  3 5 - 5 3 mmol/L Final    Chloride 105  100 - 108 mmol/L Final    CO2 22  21 - 32 mmol/L Final    Anion Gap 12  4 - 13 mmol/L Final    BUN 12  5 - 25 mg/dL Final    Creatinine 0 99  0 60 - 1 30 mg/dL Final    Comment: Standardized to IDMS reference method    Calcium 9 1  8 3 - 10 1 mg/dL Final    AST 14  5 - 45 U/L Final    Comment:   Specimen collection should occur prior to Sulfasalazine administration due to the potential for falsely depressed results  ALT 17  12 - 78 U/L Final    Comment:   Specimen collection should occur prior to Sulfasalazine and/or Sulfapyridine administration due to the potential for falsely depressed results       Alkaline Phosphatase 56  46 - 116 U/L Final    Albumin 3 9  3 5 - 5 0 g/dL Final    eGFR 96  ml/min/1 73sq m Final    Narrative:     National Kidney Disease Education Program recommendations are as follows:  GFR calculation is accurate only with a steady state creatinine  Chronic Kidney disease less than 60 ml/min/1 73 sq  meters  Kidney failure less than 15 ml/min/1 73 sq  meters  CBC AND DIFFERENTIAL - Abnormal     WBC 24 32 (*) 4 31 - 10 16 Thousand/uL Final    Neutrophils Relative 87 (*) 43 - 75 % Final    Lymphocytes Relative 4 (*) 14 - 44 % Final    Neutrophils Absolute 20 96 (*) 1 85 - 7 62 Thousands/µL Final    Monocytes Absolute 2 20 (*) 0 17 - 1 22 Thousand/µL Final    RBC 5 29  3 88 - 5 62 Million/uL Final    Hemoglobin 16 4  12 0 - 17 0 g/dL Final    Hematocrit 43 9  36 5 - 49 3 % Final    MCV 83  82 - 98 fL Final    MCH 31 0  26 8 - 34 3 pg Final    MCHC 37 4  31 4 - 37 4 g/dL Final    RDW 12 9  11 6 - 15 1 % Final    MPV 11 4  8 9 - 12 7 fL Final    Platelets 150  423 - 390 Thousands/uL Final    nRBC 0  /100 WBCs Final    Monocytes Relative 9  4 - 12 % Final    Eosinophils Relative 0  0 - 6 % Final    Basophils Relative 0  0 - 1 % Final    Lymphocytes Absolute 1 03  0 60 - 4 47 Thousands/µL Final    Eosinophils Absolute 0 01  0 00 - 0 61 Thousand/µL Final    Basophils Absolute 0 02  0 00 - 0 10 Thousands/µL Final   LIPASE - Abnormal     Lipase 62 (*) 73 - 393 u/L Final   PROTIME-INR - Abnormal     Protime 15 8 (*) 12 1 - 14 4 seconds Final    INR 1 25 (*) 0 86 - 1 16 Final   LACTIC ACID, PLASMA - Abnormal     LACTIC ACID 2 6 (*) 0 5 - 2 0 mmol/L Final    Narrative:     Result may be elevated if tourniquet was used during collection     POCT CHEM 8+ - Abnormal     Anion Gap, Istat 21 (*) 4 - 13 mmol/L Final    Glucose, i-STAT 170 (*) 65 - 140 mg/dl Final    SODIUM, I-STAT 141  136 - 145 mmol/l Final    Potassium, i-STAT 3 5  3 5 - 5 3 mmol/L Final    Chloride, istat 102  100 - 108 mmol/L Final    CO2, i-STAT 22  21 - 32 mmol/L Final Calcium, Ionized i-STAT 1 12  1 12 - 1 32 mmol/L Final    BUN, I-STAT 12  5 - 25 mg/dl Final    Creatinine, i-STAT 0 8  0 6 - 1 3 mg/dl Final    eGFR 113  ml/min/1 73sq m Final    Hct, i-STAT 47  36 5 - 49 3 % Final    Hgb, i-STAT 16 0  12 0 - 17 0 g/dl Final    Specimen Type VENOUS   Final   APTT - Normal    PTT 31  23 - 35 seconds Final    Narrative: Therapeutic Heparin Range = 60-90 seconds   LACTIC ACID, PLASMA - Normal    LACTIC ACID 1 6  0 5 - 2 0 mmol/L Final    Narrative:     Result may be elevated if tourniquet was used during collection  POCT TROPONIN - Normal    POC Troponin I 0 00  0 00 - 0 08 ng/ml Final    Specimen Type VENOUS   Final    Narrative:     [employer] i-Stat handheld analyzer 99% cutoff is > 0 08ng/mL in [facility]    o cTnI 99% cutoff is useful only when applied to patients in the clinical setting of myocardial ischemia  o cTnI 99% cutoff should be interpreted in the context of clinical history, ECG findings and possibly cardiac imaging to establish correct diagnosis  o cTnI 99% cutoff may be suggestive but clearly not indicative of a coronary event without the clinical setting of myocardial ischemia       Time reflects when diagnosis was documented in both MDM as applicable and the Disposition within this note     Time User Action Codes Description Comment    11/7/2017  9:48 PM Julian Moran Add [R10 9] Abdominal pain     11/7/2017  9:48 PM Julian Moran Add [D72 829] Leukocytosis     11/7/2017  9:48 PM Julian Moran Add [K81 0] Acute cholecystitis     11/7/2017  9:48 PM Julian Moran Add [R11 2] Nausea & vomiting     11/8/2017 12:55 PM Lc President HERMINIA Modify [K81 0] Acute cholecystitis     11/8/2017  2:37 PM Alisa AYALA Modify [K81 0] Acute cholecystitis     11/10/2017  7:57 AM Saqib Sanchez Add [K83 9] Bile leak       ED Disposition     ED Disposition Condition Comment    Admit  Case was discussed with Dr Georgiana Mcrae and the patient's admission status was agreed to be Admission Status: inpatient status to the service of Dr Silverio Yeimi   Follow-up Information    None       There are no discharge medications for this patient  No discharge procedures on file  None       Portions of the record may have been created with voice recognition software  Occasional wrong word or "sound a like" substitutions may have occurred due to the inherent limitations of voice recognition software  Read the chart carefully and recognize, using context, where substitutions have occurred      Electronically signed by:  Emile Ferrer

## 2017-11-08 NOTE — OP NOTE
OPERATIVE REPORT  PATIENT NAME: Ly Forde    :  1980  MRN: [id number]  Pt Location: [facility] OR ROOM 03    SURGERY DATE: 2017    Surgeon(s) and Role:     * Dl Chaney MD - Primary     * Kamaljit De Jesus MD - Assisting    Preop Diagnosis:  Acute cholecystitis [K81 0]    Post-Op Diagnosis Codes:     * Acute cholecystitis [K81 0]    Procedure(s) (LRB):  CHOLECYSTECTOMY LAPAROSCOPIC, CONVERTED TO OPEN CHOLECYSTECTOMY, REPAIR UMBLICAL HERNIA  AND LYSIS OF ADHESIONS (N/A)    Specimen(s):  ID Type Source Tests Collected by Time Destination   1 :  Tissue Gallbladder TISSUE EXAM Dl Chaney MD 2017 1335    A :  Body Fluid Abdominal ANAEROBIC CULTURE AND GRAM STAIN, BODY FLUID CULTURE AND GRAM STAIN Dl Chaney MD 2017 1255        Estimated Blood Loss:   200 mL    Drains:  Closed/Suction Drain RUQ Bulb 19 Fr  (Active)   Site Description Unable to view 2017  4:15 PM   Dressing Status Clean;Dry; Intact 2017  4:15 PM   Drainage Appearance Serosanguineous 2017  4:15 PM   Status To bulb suction 2017  4:15 PM   Output (mL) 20 mL 2017  4:15 PM   Number of days: 0       Urethral Catheter Latex 16 Fr  (Active)   Site Assessment Clean;Skin intact; Patent 2017  4:15 PM   Collection Container Standard drainage bag 2017  4:15 PM   Securement Method Securing device (Describe) 2017  4:15 PM   Output (mL) 225 mL 2017  4:15 PM   Number of days: 0       Anesthesia Type:   General    Operative Indications:  Acute cholecystitis [K81 0]      Operative Findings:  Acute gangrenous necrotic gall bladder with bile effluent in the abdomen    Complications:   None    Procedure and Technique:  The patient was identified in the holding area using armband and consent  The patient was taken to the operating room  The patient underwent general routine endotracheal anesthesia  The patient was prepped and draped in a sterile routine fashion     A preoperative pause was performed to identify patient and procedure  Using an 11 blade, a midline umbilical incision was performed at the umbilical hernia defect  Dissection was carried down with metzebaum scissors  The defect contained fat which was safely reduced into the abdomen  5MM port was placed into the abdomen  Insufflation was achieved  Using a 5 30 scope, it was noted that the patient had bilious effluent around liver and the omentum had wall off the gall bladder  Then another 5mm port was placed in the right upper quadrant under direct visualization  Then using a blunt grasper, the omentum was piled away from the gall bladder  The gall bladder was noted to be gangrenous, necrotic  Due to being unable to further pile off omentum laparoscopically, we proceeded with an open approach  Thus a right subcostal incision was made 2 fingerbreaths away from costal margin  Dissection was carried down the fascia using bovie and herminio  The peritoneum was entered  Bilious effluent was suctioned and sent for cultures  Then a bookwalter was used to allow for proper retraction  2 lap pads were packed behind the liver to allow exposure of the gall bladder  Then the gall bladder was dissected off the liver bed using right ankle and bovie, dissection was carried down the infundabulocystic junction  Then the cystic artery was identified and ligated with 2-0 silk sutures  The cystic duct was found from within the walls of the gall bladder and tired using 2-0 prolene figure of 8s x2  A 19 Hungarian round ALESSIA was used and placed around cystic plate  Irrigation was performed  Hemostasis was achieved  Flowseal was used on the liver bed  All counts and instruments were correct  The wand was performed and was read as clear  The fascia was closed with 0-prolenes x2 for both anterior and posterior rectus sheath  And the skin was closed with staples  The umbilicus hernia defect was closed with #1 PDS simple sutures  And Monocryl and histoacryl   Xeroform, 4x4 gauze and tegoderms were placed over wound  The patient was extubated and transferred to the PACU in stable conditions  Dr Cathy Frost was present through entire portion of the case     Patient Disposition:  PACU     SIGNATURE: Kamaljit De Jesus MD  DATE: November 8, 2017  TIME: 6:09 PM

## 2017-11-08 NOTE — PLAN OF CARE
Problem: DISCHARGE PLANNING - CARE MANAGEMENT  Goal: Discharge to post-acute care or home with appropriate resources  INTERVENTIONS:  - Conduct assessment to determine patient/family and health care team treatment goals, and need for post-acute services based on payer coverage, community resources, and patient preferences, and barriers to discharge  - Address psychosocial, clinical, and financial barriers to discharge as identified in assessment in conjunction with the patient/family and health care team  - Arrange appropriate level of post-acute services according to patient's   needs and preference and payer coverage in collaboration with the physician and health care team  - Communicate with and update the patient/family, physician, and health care team regarding progress on the discharge plan  - Arrange appropriate transportation to post-acute venues  When medically clear will d/c home with family    Outcome: Progressing

## 2017-11-08 NOTE — PROGRESS NOTES
Pt arrived from PACU  Settled in bed  Wife stating patient is talking funny  Neurologically intact with slight slur in speech  Improving with time and with water

## 2017-11-08 NOTE — ED PROVIDER NOTES
History  Chief Complaint   Patient presents with    Chest Pain     per patient, "sunday I staretd not feeling good, my belly wasn't feeling good  and then it went away, then monday i started not feeling good again and had a hard time sleeping last night  Woke up this morning, threw up at work, went home and my chest hurts  i have a hard time catching a full breath " denies n/v/d   Abdominal Pain     This is a 26-year-old male with no significant past medical history presents for evaluation of abdominal pain, nausea, vomiting  He states that on Sunday he started having generalized abdominal pain which got better by the evening however on Monday the pain got worse again and migrated to right lower quadrant  The pain was worse with any movement, constant and centered in his right lower quadrant, as well as right flank and right upper quadrant  He had chills and tremors yesterday evening but no documented temperature  This morning he had multiple episodes of nausea and nonbloody nonbilious vomitus  He denies any diarrhea or constipation  He states that his wife and daughter have an upper respiratory infection but no GI complaints  He has not had any  abdominal surgery in the past   He did not take any pain medications prior to arrival   He has never had similar symptoms in the past             None       History reviewed  No pertinent past medical history  Past Surgical History:   Procedure Laterality Date    TONSILLECTOMY         History reviewed  No pertinent family history  I have reviewed and agree with the history as documented  Social History   Substance Use Topics    Smoking status: Never Smoker    Smokeless tobacco: Never Used    Alcohol use Yes      Comment: occasional        Review of Systems   Constitutional: Positive for appetite change and chills  Negative for fever  HENT: Negative for rhinorrhea and sore throat  Eyes: Negative for photophobia and visual disturbance     Respiratory: Negative for cough and shortness of breath  Cardiovascular: Negative for chest pain and palpitations  Gastrointestinal: Positive for abdominal pain, nausea and vomiting  Negative for blood in stool and diarrhea  Genitourinary: Negative for dysuria, frequency and urgency  Skin: Negative for rash  Neurological: Negative for dizziness and weakness  All other systems reviewed and are negative  Physical Exam  ED Triage Vitals   Temperature Pulse Respirations Blood Pressure SpO2   11/07/17 2039 11/07/17 2039 11/07/17 2039 11/07/17 2039 11/07/17 2039   98 2 °F (36 8 °C) (!) 120 18 133/86 96 %      Temp Source Heart Rate Source Patient Position - Orthostatic VS BP Location FiO2 (%)   11/07/17 2039 11/07/17 2039 11/07/17 2039 11/07/17 2039 --   Oral Monitor Sitting Right arm       Pain Score       11/07/17 2132       Worst Possible Pain           Orthostatic Vital Signs  Vitals:    11/07/17 2039 11/07/17 2215 11/07/17 2224 11/07/17 2341   BP: 133/86  114/62 124/84   Pulse: (!) 120 (!) 110 (!) 109 105   Patient Position - Orthostatic VS: Sitting  Lying Sitting       Physical Exam   Constitutional: He is oriented to person, place, and time  He appears well-developed and well-nourished  HENT:   Head: Normocephalic and atraumatic  Right Ear: External ear normal    Left Ear: External ear normal    Mouth/Throat: Oropharynx is clear and moist    Eyes: Conjunctivae and EOM are normal  Pupils are equal, round, and reactive to light  Neck: Normal range of motion  Neck supple  No JVD present  No tracheal deviation present  Cardiovascular: Regular rhythm and normal heart sounds  Exam reveals no gallop and no friction rub  No murmur heard  Tachycardic, sinus   Pulmonary/Chest: Effort normal  No stridor  No respiratory distress  He has no wheezes  He has no rales  Abdominal: Soft  He exhibits distension  He exhibits no mass  There is tenderness  There is rebound and guarding     Musculoskeletal: Normal range of motion  He exhibits no edema  Neurological: He is alert and oriented to person, place, and time  No cranial nerve deficit  Skin: Skin is warm and dry  Capillary refill takes less than 2 seconds  No rash noted  No erythema  No pallor  Psychiatric: He has a normal mood and affect  Nursing note and vitals reviewed        ED Medications  Medications   sodium chloride 0 9 % bolus 1,000 mL (1,000 mL Intravenous New Bag 11/7/17 2331)   ceFAZolin (ANCEF) IVPB (premix) 2,000 mg (not administered)   metroNIDAZOLE (FLAGYL) IVPB (premix) 500 mg (not administered)   lactated ringers infusion (not administered)   ondansetron (ZOFRAN) injection 4 mg (not administered)   HYDROmorphone (DILAUDID) 1 mg/mL injection 0 5 mg (not administered)   oxyCODONE (ROXICODONE) IR tablet 5 mg (not administered)   oxyCODONE (ROXICODONE) immediate release tablet 10 mg (not administered)   acetaminophen (TYLENOL) tablet 650 mg (not administered)   sodium chloride 0 9 % bolus 1,000 mL (not administered)   ceFAZolin (ANCEF) IVPB (premix) 2,000 mg (not administered)   metroNIDAZOLE (FLAGYL) IVPB (premix) 500 mg (not administered)   morphine (PF) 10 mg/mL injection 6 mg (6 mg Intravenous Given 11/7/17 2132)   ondansetron (ZOFRAN) injection 4 mg (4 mg Intravenous Given 11/7/17 2135)   sodium chloride 0 9 % bolus 1,000 mL (0 mL Intravenous Stopped 11/7/17 2327)   acetaminophen (TYLENOL) tablet 975 mg (975 mg Oral Given 11/7/17 2337)   iohexol (OMNIPAQUE) 350 MG/ML injection (MULTI-DOSE) 100 mL (100 mL Intravenous Given 11/7/17 2229)       Diagnostic Studies  Results Reviewed     Procedure Component Value Units Date/Time    Comprehensive metabolic panel [[id number]]  (Abnormal) Collected:  11/07/17 2104    Lab Status:  Final result Specimen:  Blood from Arm, Left Updated:  11/08/17 0034     Sodium 139 mmol/L      Potassium 3 6 mmol/L      Chloride 105 mmol/L      CO2 22 mmol/L      Anion Gap 12 mmol/L      BUN 12 mg/dL      Creatinine 0 99 mg/dL      Glucose 158 (H) mg/dL      Calcium 9 1 mg/dL      AST 14 U/L      ALT 17 U/L      Alkaline Phosphatase 56 U/L      Total Protein 8 4 (H) g/dL      Albumin 3 9 g/dL      Total Bilirubin 1 65 (H) mg/dL      eGFR 96 ml/min/1 73sq m     Narrative:         National Kidney Disease Education Program recommendations are as follows:  GFR calculation is accurate only with a steady state creatinine  Chronic Kidney disease less than 60 ml/min/1 73 sq  meters  Kidney failure less than 15 ml/min/1 73 sq  meters  Lactic acid, plasma [[id number]]     Lab Status:  No result Specimen:  Blood     Lactic Acid x2 [[id number]]  (Normal) Collected:  11/07/17 2328    Lab Status:  Final result Specimen:  Blood from Arm, Left Updated:  11/07/17 2354     LACTIC ACID 1 6 mmol/L     Narrative:         Result may be elevated if tourniquet was used during collection  Lactic Acid x2 [[id number]]  (Abnormal) Collected:  11/07/17 2135    Lab Status:  Final result Specimen:  Blood from Arm, Left Updated:  11/07/17 2232     LACTIC ACID 2 6 (HH) mmol/L     Narrative:         Result may be elevated if tourniquet was used during collection  APTT [[id number]]  (Normal) Collected:  11/07/17 2137    Lab Status:  Final result Specimen:  Blood from Arm, Right Updated:  11/07/17 2212     PTT 31 seconds     Narrative: Therapeutic Heparin Range = 60-90 seconds    Protime-INR [[id number]]  (Abnormal) Collected:  11/07/17 2137    Lab Status:  Final result Specimen:  Blood from Arm, Right Updated:  11/07/17 2212     Protime 15 8 (H) seconds      INR 1 25 (H)    Blood culture #1 [[id number]] Collected:  11/07/17 2137    Lab Status: In process Specimen:  Blood from Arm, Right Updated:  11/07/17 2145    Blood culture #2 [[id number]] Collected:  11/07/17 2135    Lab Status:   In process Specimen:  Blood from Arm, Left Updated:  11/07/17 2145    Lipase [[id number]]  (Abnormal) Collected:  11/07/17 2104    Lab Status:  Final result Specimen:  Blood from Arm, Left Updated:  11/07/17 2144     Lipase 62 (L) u/L     POCT Chem 8+ [[id number]]  (Abnormal) Collected:  11/07/17 2125    Lab Status:  Final result Updated:  11/07/17 2130     SODIUM, I-STAT 141 mmol/l      Potassium, i-STAT 3 5 mmol/L      Chloride, istat 102 mmol/L      CO2, i-STAT 22 mmol/L      Anion Gap, Istat 21 (H) mmol/L      Calcium, Ionized i-STAT 1 12 mmol/L      BUN, I-STAT 12 mg/dl      Creatinine, i-STAT 0 8 mg/dl      eGFR 113 ml/min/1 73sq m      Glucose, i-STAT 170 (H) mg/dl      Hct, i-STAT 47 %      Hgb, i-STAT 16 0 g/dl      Specimen Type VENOUS    POCT troponin [[id number]]  (Normal) Collected:  11/07/17 2112    Lab Status:  Final result Updated:  11/07/17 2125     POC Troponin I 0 00 ng/ml      Specimen Type VENOUS    Narrative:         [employer] i-Stat handheld analyzer 99% cutoff is > 0 08ng/mL in [facility] Emergency Departments    o cTnI 99% cutoff is useful only when applied to patients in the clinical setting of myocardial ischemia  o cTnI 99% cutoff should be interpreted in the context of clinical history, ECG findings and possibly cardiac imaging to establish correct diagnosis  o cTnI 99% cutoff may be suggestive but clearly not indicative of a coronary event without the clinical setting of myocardial ischemia      CBC and differential [[id number]]  (Abnormal) Collected:  11/07/17 2104    Lab Status:  Final result Specimen:  Blood from Arm, Left Updated:  11/07/17 2123     WBC 24 32 (H) Thousand/uL      RBC 5 29 Million/uL      Hemoglobin 16 4 g/dL      Hematocrit 43 9 %      MCV 83 fL      MCH 31 0 pg      MCHC 37 4 g/dL      RDW 12 9 %      MPV 11 4 fL      Platelets 727 Thousands/uL      nRBC 0 /100 WBCs      Neutrophils Relative 87 (H) %      Lymphocytes Relative 4 (L) %      Monocytes Relative 9 %      Eosinophils Relative 0 %      Basophils Relative 0 %      Neutrophils Absolute 20 96 (H) Thousands/µL      Lymphocytes Absolute 1 03 Thousands/µL      Monocytes Absolute 2 20 (H) Thousand/µL Eosinophils Absolute 0 01 Thousand/µL      Basophils Absolute 0 02 Thousands/µL     UA w Reflex to Microscopic w Reflex to Culture [[id number]]     Lab Status:  No result Specimen:  Urine                  CT abdomen pelvis w contrast   ED Interpretation by Leandra Gan MD (11/07 6580)   CT ordered by myself and the report from radiologist has been reviewed  Final Result by Odilon Jackson MD (11/07 2307)         1  Findings suspicious for acute cholecystitis with inflammation extending into the mesentery  Moderate amount of ascites within the pelvis and tracking the paracolic gutters is noted  2  Mild wall thickening of the hepatic flexure and gastric antrum, likely reactive changes related to inflammation involving the gallbladder  ##cfslh   I personally discussed this result with Dr Isabelle Cottrell on 11/7/2017 11:27 PM    ##         Workstation performed: [id number]         X-ray chest 2 views    (Results Pending)   XR chest portable    (Results Pending)         Procedures  Procedures      Phone Consults  ED Phone Contact    ED Course  ED Course as of Nov 08 0040   Tue Nov 07, 2017 2122 Procedure Note: EKG  Date/Time: 11/07/17 9:22 PM   Performed by: [facility]  Authorized by: [facility]  Indications / Diagnosis: Tachycardia  ECG reviewed by me, the ED Provider: yes   The EKG demonstrates:  Rhythm: sinus tachycardia  Intervals: normal intervals  Axis: normal axis  QRS/Blocks: normal QRS  ST Changes: No acute ST Changes, no STD/ROBERT       2322 After multiple attempts to contact the lab we were initially told that CMP was being run as an add on an was due to be done in 15 minutes however after calling back the lab 30 minutes after that phone call where now told that the CMP is still missing and in fact is not in process  Will redraw the lab at this time    Wed Nov 08, 2017   0029 CMP still not available, contacted lab again       0034 Total Bilirubin: (!) 1 65 Initial Sepsis Screening     Row Name 11/07/17 2129                Is the patient's history suggestive of a new or worsening infection? (!)  Yes (Proceed)  -EB        Suspected source of infection acute abdominal infection  -EB        Are two or more of the following signs & symptoms of infection both present and new to the patient? (!)  Yes (Proceed)  -EB        Indicate SIRS criteria Hyperthemia > 38 3C (100 9F); Tachycardia > 90 bpm;Tachypnea > 20 resp per min;Leukocytosis (WBC > 02514 IJL)  -EB        If the answer is yes to both questions, suspicion of sepsis is present          If severe sepsis is present AND tissue hypoperfusion perists in the hour after fluid resuscitation or lactate > 4, the patient meets criteria for SEPTIC SHOCK          Are any of the following organ dysfunction criteria present within 6 hours of suspected infection and SIRS criteria that are NOT considered to be chronic conditions?         Organ dysfunction          Date of presentation of severe sepsis          Time of presentation of severe sepsis          Tissue hypoperfusion persists in the hour after crystalloid fluid administration, evidenced, by either:          Was hypotension present within one hour of the conclusion of crystalloid fluid administration?           Date of presentation of septic shock          Time of presentation of septic shock            User Key  (r) = Recorded By, (t) = Taken By, (c) = Cosigned By    [address] Name Provider Type    EB Ford Pratt MD Resident                  MDM  Number of Diagnoses or Management Options  Abdominal pain:   Acute cholecystitis:   Leukocytosis:   Nausea & vomiting:   Diagnosis management comments: 38-year-old male presenting for evaluation of abdominal pain, nausea, vomiting, given the patient is febrile, concerning for abdominal infection will get septic labs, will obtain CT of the abdomen with IV contrast to evaluate for cholecystitis versus appendicitis versus other intra-abdominal inflammatory or infectious etiology  Will fluid hydrate patient and contact surgery for further evaluation    CritCare Time    Disposition  Final diagnoses:   Abdominal pain   Leukocytosis   Acute cholecystitis   Nausea & vomiting     Time reflects when diagnosis was documented in both MDM as applicable and the Disposition within this note     Time User Action Codes Description Comment    11/7/2017  9:48 PM Eric Ariela Add [R10 9] Abdominal pain     11/7/2017  9:48 PM Eric Ariela Add [D72 829] Leukocytosis     11/7/2017  9:48 PM Eric Ariela Add [K81 0] Acute cholecystitis     11/7/2017  9:48 PM Eric Ariela Add [R11 2] Nausea & vomiting       ED Disposition     ED Disposition Condition Comment    Admit  Case was discussed with Dr Julio Cesar Charles and the patient's admission status was agreed to be Admission Status: inpatient status to the service of Dr uJlio Cesar Charles   Follow-up Information    None       Patient's Medications    No medications on file     No discharge procedures on file  ED Provider  Attending physically available and evaluated Clare Nazario I managed the patient along with the ED Attending      Electronically Signed by         Mary Rizo MD  Resident  11/08/17 8874

## 2017-11-08 NOTE — ANESTHESIA POSTPROCEDURE EVALUATION
Post-Op Assessment Note      CV Status:  Stable    Mental Status:  Alert and awake    Hydration Status:  Euvolemic    PONV Controlled:  Controlled    Airway Patency:  Patent    Post Op Vitals Reviewed: Yes          Staff: CRNA           /85 (11/08/17 1505)    Temp 98 5 °F (36 9 °C) (11/08/17 1505)    Pulse (!) 112 (11/08/17 1505)   Resp (!) 28 (11/08/17 1505)    SpO2 95 % (11/08/17 1505)

## 2017-11-08 NOTE — CASE MANAGEMENT
Notification of Inpatient Admission/Inpatient Authorization Request  This is a Notification of Inpatient Admission/Request for Inpatient Authorization to our facility [facility]  Please be advised that this patient is currently in our facility under Inpatient Status  Below you will find the Attending Physician and Facilitys information including NPI# and contact information for the Utilization  assigned to the [facility] where the patient is receiving services  Please feel free to contact the Utilization Review Department with any questions  Patient Information:  PATIENT NAME: Kiersten Walker  MRN: [id number]  YOB: 1980    PRESENTATION DATE: 11/7/2017  8:47 PM  IP ADMISSION DATE: 11/7/17 2202  DISCHARGE DATE: No discharge date for patient encounter  DISPOSITION: Final discharge disposition not confirmed    Attending Physician:  BELKIS Golden  Specialty- General Surgery,   [facility] ID- [id number]  [address] [place] [address]  Phone 1: [phone]  Phone 2: [phone]  Fax: [phone]    Facility:  [address] [place] [address] [phone]  NPI: [id number]  TAX ID# [id number]  MEDICARE ID: [id number]    [id number] [facility] in the [facility] by Marcelino Woods for 2017  Network Utilization Review Department  Phone: [phone]; Fax [phone]  ATTENTION: The Network Utilization Review Department is now centralized for our 7 Facilities  Make a note that we have a new phone and fax numbers for our Department  Please call with any questions or concerns to [phone] and carefully follow the prompts so that you are directed to the right person  All voicemails are confidential  Fax any determinations, approvals, denials, and requests for initial or continue stay review clinical to [phone]   Due to HIGH CALL volume, it would be easier if you could please send faxed requests to expedite your requests and in part, help us provide discharge notifications faster

## 2017-11-08 NOTE — PROGRESS NOTES
Progress Note - General Surgery  Kim Sherman 42 y o  male MRN: [id number]  Unit/Bed#: [facility] 829-01 Encounter: [id number]    Assessment:  42y o -year-old male with acute cholecystitis     Plan:  - f/u CMP to assess need for IOC vs MRCP/ERCP if total bilirubin is elevated  - OR today for laparoscopic cholecystectomy-->possibly with IOC if bilirubin is elevated; patient is high risk for open operation, will discuss further with him in the morning  - pain control  - IS  - OOB  - ambulate    Felipe Amor MD PGY-4  5:47 AM  11/08/17      Subjective:  Pain is improved overnight after getting pain medication  He slept well  No nausea or vomiting overnight  Objective:  Patient Vitals for the past 24 hrs:   BP Temp Temp src Pulse Resp SpO2 Height Weight   11/08/17 0300 147/79 99 °F (37 2 °C) Oral 98 18 94 % - -   11/08/17 0100 155/88 98 6 °F (37 °C) Oral (!) 106 18 97 % 6' 7" (2 007 m) 116 kg (255 lb 11 7 oz)   11/07/17 2341 124/84 - - 105 20 96 % - -   11/07/17 2224 114/62 - - (!) 109 20 95 % - -   11/07/17 2215 - - - (!) 110 22 94 % - -   11/07/17 2119 - (!) 102 1 °F (38 9 °C) Rectal - - - - -   11/07/17 2039 133/86 98 2 °F (36 8 °C) Oral (!) 120 18 96 % - 113 kg (250 lb)          Diet Orders            Start     Ordered    11/07/17 2353  Diet NPO; Sips with meds  Diet effective now     Question Answer Comment   Diet Type NPO    NPO Except: Sips with meds    RD to adjust diet per protocol?  Yes        11/07/17 2355        Intake/Output Summary (Last 24 hours) at 11/08/17 0547  Last data filed at 11/08/17 0204   Gross per 24 hour   Intake          1268 34 ml   Output                0 ml   Net          1268 34 ml   UOP x1     Physical Exam:  General: NAD  Cardiovascular: RRR  Respiratory: breath sounds b/l  Abdomen: soft, mild distension, tender RUQ, LLQ, epigastrium  Extremities: no edema    Medications:    ciprofloxacin 400 mg Intravenous Q12H   clindamycin 900 mg Intravenous On Call To OR   metroNIDAZOLE 500 mg Intravenous Q8H     lactated ringers 125 mL/hr Last Rate: 125 mL/hr (11/08/17 0103)     acetaminophen 650 mg Q6H PRN   HYDROmorphone 0 5 mg Q3H PRN   ondansetron 4 mg Q6H PRN   oxyCODONE 10 mg Q4H PRN   oxyCODONE 5 mg Q4H PRN     Laboratory results:   CBC:   Lab Results   Component Value Date    WBC 24 32 (H) 11/07/2017    HGB 16 0 11/07/2017    HCT 43 9 11/07/2017    MCV 83 11/07/2017     11/07/2017    MCH 31 0 11/07/2017    MCHC 37 4 11/07/2017    RDW 12 9 11/07/2017    MPV 11 4 11/07/2017    NRBC 0 11/07/2017   , CMP:   Lab Results   Component Value Date     11/07/2017    K 3 6 11/07/2017     11/07/2017    CO2 22 11/07/2017    ANIONGAP 12 11/07/2017    BUN 12 11/07/2017    CREATININE 0 99 11/07/2017    GLUCOSE 170 (H) 11/07/2017    CALCIUM 9 1 11/07/2017    AST 14 11/07/2017    ALT 17 11/07/2017    ALKPHOS 56 11/07/2017    PROT 8 4 (H) 11/07/2017    ALBUMIN 3 9 11/07/2017    BILITOT 1 65 (H) 11/07/2017    EGFR 113 11/07/2017   , Coagulation:   Lab Results   Component Value Date    INR 1 25 (H) 11/07/2017   , Urinalysis: No results found for: Vear Law, SPECGRAV, PHUR, LEUKOCYTESUR, NITRITE, PROTEINUA, GLUCOSEU, KETONESU, BILIRUBINUR, BLOODU, Amylase: No results found for: AMYLASE, Lipase:   Lab Results   Component Value Date    LIPASE 62 (L) 11/07/2017       VTE Pharmacologic Prophylaxis: Heparin  VTE Mechanical Prophylaxis: sequential compression device

## 2017-11-08 NOTE — ANESTHESIA PROCEDURE NOTES
Peripheral Block    Patient location during procedure: OR  Start time: 11/8/2017 2:44 PM  End time: 11/8/2017 2:46 PM  Reason for block: procedure for pain, at surgeon's request and post-op pain management  Staffing  Anesthesiologist: Darwin Paredes  Resident/CRNA: Toño Kaur  Performed: anesthesiologist   Preanesthetic Checklist  Completed: patient identified, site marked, surgical consent, pre-op evaluation, timeout performed, IV checked, risks and benefits discussed and monitors and equipment checked  Peripheral Block  Patient position: supine  Prep: ChloraPrep  Patient monitoring: continuous pulse ox, frequent blood pressure checks, cardiac monitor and heart rate  Block type: TAP  Laterality: right  Procedures: ultrasound guided  ultrasound permanent image saved    Local infiltration: ropivacaine  Infiltration strength: 0 5 %  Dose: 30 mL  Needle  Needle type: Stimuplex   Needle gauge: 21 G  Needle length: 10 cm  Needle localization: ultrasound guidance  Test dose: negative  Assessment  Injection assessment: incremental injection, local visualized surrounding nerve on ultrasound and negative aspiration for CSF  Paresthesia pain: none  Heart rate change: no  Slow fractionated injection: yes  Post-procedure:  site cleaned  patient tolerated the procedure well with no immediate complications

## 2017-11-08 NOTE — CASE MANAGEMENT
Initial Clinical Review    Admission: Date/Time/Statement: 11/7/17 @ 2202 Inpatient Written     Orders Placed This Encounter   Procedures    Inpatient Admission     Standing Status:   Standing     Number of Occurrences:   1     Order Specific Question:   Admitting Physician     Answer:   Vitor Ford     Order Specific Question:   Level of Care     Answer:   Med Surg [16]     Order Specific Question:   Estimated length of stay     Answer:   More than 2 Midnights     Order Specific Question:   Certification     Answer:   I certify that inpatient services are medically necessary for this patient for a duration of greater than two midnights  See H&P and MD Progress Notes for additional information about the patient's course of treatment  ED: Date/Time/Mode of Arrival:   ED Arrival Information     Expected Arrival Acuity Means of Arrival Escorted By Service Admission Type    - 11/7/2017 20:36 Emergent Walk-In Self Surgery-General Emergency    Arrival Complaint    Chest pain          Chief Complaint:   Chief Complaint   Patient presents with    Chest Pain     per patient, "sunday I staretd not feeling good, my belly wasn't feeling good  and then it went away, then monday i started not feeling good again and had a hard time sleeping last night  Woke up this morning, threw up at work, went home and my chest hurts  i have a hard time catching a full breath " denies n/v/d   Abdominal Pain       History of Illness: This is a 27-year-old male with no significant past medical history presents for evaluation of abdominal pain, nausea, vomiting  He states that on Sunday he started having generalized abdominal pain which got better by the evening however on Monday the pain got worse again and migrated to right lower quadrant  The pain was worse with any movement, constant and centered in his right lower quadrant, as well as right flank and right upper quadrant   He had chills and tremors yesterday evening but no documented temperature  This morning he had multiple episodes of nausea and nonbloody nonbilious vomitus  He denies any diarrhea or constipation  He states that his wife and daughter have an upper respiratory infection but no GI complaints  He has not had any  abdominal surgery in the past   He did not take any pain medications prior to arrival   He has never had similar symptoms in the past     ED Vital Signs:   ED Triage Vitals   Temperature Pulse Respirations Blood Pressure SpO2   11/07/17 2039 11/07/17 2039 11/07/17 2039 11/07/17 2039 11/07/17 2039   98 2 °F (36 8 °C) (!) 120 18 133/86 96 %      Temp Source Heart Rate Source Patient Position - Orthostatic VS BP Location FiO2 (%)   11/07/17 2039 11/07/17 2039 11/07/17 2039 11/07/17 2039 --   Oral Monitor Sitting Right arm       Pain Score       11/07/17 2132       Worst Possible Pain        Wt Readings from Last 1 Encounters:   11/08/17 116 kg (255 lb 11 7 oz)       Vital Signs (abnormal): temp 102 1, -120    Abnormal Labs/Diagnostic Test Results:   WBC 24 32 (H)     GLUCOSE 170 (H)     LIPASE 62 (L)     LACTIC ACID 2 6 (HH)     Protime 15 8 (H)     INR 1 25 (H)     CT Abd:    1  Findings suspicious for acute cholecystitis with inflammation extending into the mesentery  Moderate amount of ascites within the pelvis and tracking the paracolic gutters is noted  2  Mild wall thickening of the hepatic flexure and gastric antrum, likely reactive changes related to inflammation involving the gallbladder       ED Treatment:   Medication Administration from 11/07/2017 2036 to 11/08/2017 0052       Date/Time Order Dose Route Action     11/07/2017 2132 morphine (PF) 10 mg/mL injection 6 mg 6 mg Intravenous Given     11/07/2017 2135 ondansetron (ZOFRAN) injection 4 mg 4 mg Intravenous Given     11/07/2017 2127 sodium chloride 0 9 % bolus 1,000 mL 1,000 mL Intravenous New Bag     11/07/2017 2337 acetaminophen (TYLENOL) tablet 975 mg 975 mg Oral Given 11/07/2017 2229 iohexol (OMNIPAQUE) 350 MG/ML injection (MULTI-DOSE) 100 mL 100 mL Intravenous Given     11/07/2017 2331 sodium chloride 0 9 % bolus 1,000 mL 1,000 mL Intravenous New Bag     11/08/2017 0044 ceFAZolin (ANCEF) IVPB (premix) 2,000 mg 2,000 mg Intravenous New Bag     11/08/2017 0044 sodium chloride 0 9 % bolus 1,000 mL 1,000 mL Intravenous New Bag          Past Medical/Surgical History: Active Ambulatory Problems     Diagnosis Date Noted    No Active Ambulatory Problems     Resolved Ambulatory Problems     Diagnosis Date Noted    No Resolved Ambulatory Problems     No Additional Past Medical History       Admitting Diagnosis: Acute cholecystitis [K81 0]  Leukocytosis [D72 829]  Chest pain [R07 9]  Abdominal pain [R10 9]  Nausea & vomiting [R11 2]    Age/Sex: 42 y o  male    Assessment:  73-year-old male with acute cholecystitis  SIRS response likely secondary to sepsis from gallbladder source     Plan:  IV fluid resuscitation  Recheck lactic acid at 2:00 a m  Operating room tomorrow for laparoscopic cholecystectomy plus or minus GI consult versus intraoperative cholangiogram depending on results of complete metabolic panel  NPO  Out of bed  Incentive spirometry    Admission Orders:  NPO  OR for cholecystectomy    encs  Incentive spirometry  I/O  US Abd  Sequential compression device  Blood cxs pending    Scheduled Meds:   ciprofloxacin 400 mg Intravenous Q12H   clindamycin 900 mg Intravenous On Call To OR   lactated ringers 1,000 mL Intravenous Once   metroNIDAZOLE 500 mg Intravenous Q8H     Continuous Infusions:   lactated ringers 125 mL/hr Last Rate: 125 mL/hr (11/08/17 0807)     PRN Meds:   acetaminophen    HYDROmorphone 0 5mg IV x2 thus far    ondansetron    oxyCODONE IR 5mg PO x1 thus far    oxyCODONE    [facility] for [facility]  Network Utilization Review Department  Phone: [phone];  Fax [phone]  ATTENTION: The Network Utilization Review Department is now centralized for our 7 Facilities  Make a note that we have a new phone and fax numbers for our Department  Please call with any questions or concerns to [phone] and carefully follow the prompts so that you are directed to the right person  All voicemails are confidential  Fax any determinations, approvals, denials, and requests for initial or continue stay review clinical to [phone]  Due to HIGH CALL volume, it would be easier if you could please send faxed requests to expedite your requests and in part, help us provide discharge notifications faster

## 2017-11-08 NOTE — PROGRESS NOTES
PGY1 Post-Op Check Note    S: Doing well, denies nausea/vomiting, No flatus or BM  Pain controlled  O:   Vitals:    11/08/17 1715   BP: 141/87   Pulse: 96   Resp: 18   Temp: 98 5 °F (36 9 °C)   SpO2: 93%       I/O last 3 completed shifts: In: 1868 3 [I V :600; IV Piggyback:1268 3]  Out: -   I/O this shift:   In: 5014 6 [I V :4464 6; IV Piggyback:550]  Out: 1150 [Urine:870; Drains:80; Blood:200]    PE:  NAD  Norm resp effort  RRR  Abd soft, appropriately tender, non distended, dressing cdi    Lab Results   Component Value Date    WBC 22 55 (H) 11/08/2017    HGB 15 3 11/08/2017    HCT 42 5 11/08/2017    MCV 85 11/08/2017     11/08/2017     Lab Results   Component Value Date    GLUCOSE 129 11/08/2017    CALCIUM 8 6 11/08/2017     11/08/2017    K 4 0 11/08/2017    CO2 26 11/08/2017     11/08/2017    BUN 11 11/08/2017    CREATININE 1 04 11/08/2017         A/P: 42 y o  male w/acute cholecystitis s/p laparoscopic converted to open subtotal fenestrating cholecystectomy  - clears  - LR @ 75  - prn pain control  - cipro/flagyl x5 days  - paulino to suction  - continue mac, likely d/c tomorrow  - PT/OT  - SQH/SCDs

## 2017-11-08 NOTE — SOCIAL WORK
CM met with pt and pt aware cm role at discharge  Pt lives in a house with his wife Dontrell Butts [phone]  There are 6 steps to get in and 15 to get upstairs to bedroom and bathroom   Prior to admission pt was independent all ADL"S   Pt denies anyb DME's , VNA or SNF   Pt gets all medication from [facility] [address]  Pt wife will transport home when medically clear  CM reviewed d/c planning process including the following: identifying help at home, patient preference for d/c planning needs, Discharge Lounge, [facility] Meds to Bed program, availability of treatment team to discuss questions or concerns patient and/or family may have regarding understanding medications and recognizing signs and symptoms once discharged  CM also encouraged patient to follow up with all recommended appointments after discharge  Patient advised of importance for patient and family to participate in managing patients medical well being

## 2017-11-08 NOTE — H&P
H&P Exam - General Surgery   Raymundo Esquivel 42 y o  male MRN: [id number]  Unit/Bed#: ED 08 Encounter: [id number]    Assessment/Plan     Assessment:  49-year-old male with acute cholecystitis  SIRS response likely secondary to sepsis from gallbladder source    Plan:  IV fluid resuscitation  Recheck lactic acid at 2:00 a m  Operating room tomorrow for laparoscopic cholecystectomy plus or minus GI consult versus intraoperative cholangiogram depending on results of complete metabolic panel  NPO  Out of bed  Incentive spirometry    Jes Peter MD PGY-4  11:23 PM  11/07/17       History of Present Illness     HPI:  Justus Quinn is a 42 y o  male who presents with pain in his abdomen  Per the patient, he developed pain which she never had before on Sunday which was associated with some nausea  The pain was mostly generalized but focused in the right upper quadrant of his abdomen  He did report some mild fevers  The pain improved on Sunday evening and by Monday morning he was able to work  While at work he became nauseated and threw up  The pain is abdomen returned this time it was more severe in the right upper quadrant and was also somewhat generalized  The pain did not leave and by Tuesday morning, the pain had reached a pinnacle in the right upper quadrant  At this point, he is having pain in his abdomen in a generalized pattern  However he reports that the pain is mostly in the right upper quadrant  Subcu nodule  He denies diarrhea  He also reports fever at home  Review of Systems   Constitutional: Positive for fever  HENT: Negative for sore throat  Eyes: Negative for visual disturbance  Respiratory: Negative for shortness of breath  Cardiovascular: Negative for chest pain  Gastrointestinal: Positive for abdominal pain, nausea and vomiting  Negative for diarrhea  Endocrine: Negative for polyuria  Genitourinary: Negative for dysuria  Musculoskeletal: Negative for arthralgias  Skin: Negative for rash  Allergic/Immunologic: Negative for environmental allergies  Neurological: Negative for dizziness  Hematological: Negative for adenopathy  Psychiatric/Behavioral: The patient is not nervous/anxious  Historical Information   History reviewed  No pertinent past medical history  Past Surgical History:   Procedure Laterality Date    TONSILLECTOMY       Social History   History   Alcohol Use    Yes     Comment: occasional     History   Drug Use No     History   Smoking Status    Never Smoker   Smokeless Tobacco    Never Used     Family History: non-contributory    Meds/Allergies   all medications and allergies reviewed  Allergies   Allergen Reactions    Penicillins Hives    Percocet [Oxycodone-Acetaminophen] GI Intolerance       Objective   First Vitals:   Blood Pressure: 133/86 (11/07/17 2039)  Pulse: (!) 120 (11/07/17 2039)  Temperature: 98 2 °F (36 8 °C) (11/07/17 2039)  Temp Source: Oral (11/07/17 2039)  Respirations: 18 (11/07/17 2039)  Weight - Scale: 113 kg (250 lb) (11/07/17 2039)  SpO2: 96 % (11/07/17 2039)    Current Vitals:   Blood Pressure: 114/62 (11/07/17 2224)  Pulse: (!) 109 (11/07/17 2224)  Temperature: (!) 102 1 °F (38 9 °C) (11/07/17 2119)  Temp Source: Rectal (11/07/17 2119)  Respirations: 20 (11/07/17 2224)  Weight - Scale: 113 kg (250 lb) (11/07/17 2039)  SpO2: 95 % (11/07/17 2224)    No intake or output data in the 24 hours ending 11/07/17 2318    Invasive Devices     Peripheral Intravenous Line            Peripheral IV 11/07/17 Left Forearm less than 1 day    Peripheral IV 11/07/17 Right Forearm less than 1 day                Physical Exam   Constitutional: He is oriented to person, place, and time  He appears well-developed and well-nourished  HENT:   Head: Normocephalic and atraumatic  Eyes: Pupils are equal, round, and reactive to light  Neck: Normal range of motion  No tracheal deviation present     Cardiovascular: Normal rate and regular rhythm  Pulmonary/Chest: Effort normal  He has no wheezes  Abdominal: Soft  There is tenderness (RUQ, RLQ, epigastrium)  There is guarding (right-sided)  Musculoskeletal: Normal range of motion  He exhibits no edema  Neurological: He is alert and oriented to person, place, and time  No cranial nerve deficit  Skin: Skin is warm  No rash noted  Psychiatric: He has a normal mood and affect  Lab Results:   CBC:   Lab Results   Component Value Date    WBC 24 32 (H) 11/07/2017    HGB 16 0 11/07/2017    HCT 43 9 11/07/2017    MCV 83 11/07/2017     11/07/2017    MCH 31 0 11/07/2017    MCHC 37 4 11/07/2017    RDW 12 9 11/07/2017    MPV 11 4 11/07/2017    NRBC 0 11/07/2017   , CMP:   Lab Results   Component Value Date    GLUCOSE 170 (H) 11/07/2017    EGFR 113 11/07/2017   , Coagulation:   Lab Results   Component Value Date    INR 1 25 (H) 11/07/2017   , Urinalysis: No results found for: Karen Ink, SPECGRAV, PHUR, LEUKOCYTESUR, NITRITE, PROTEINUA, GLUCOSEU, KETONESU, BILIRUBINUR, BLOODU, Amylase: No results found for: AMYLASE, Lipase:   Lab Results   Component Value Date    LIPASE 62 (L) 11/07/2017     Imaging: I have personally reviewed pertinent films in PACS  EKG, Pathology, and Other Studies: I have personally reviewed pertinent films in PACS    Code Status: No Order  Advance Directive and Living Will:      Power of :    POLST:      Counseling / Coordination of Care  Total floor / unit time spent today 30 minutes  Greater than 50% of total time was spent with the patient and / or family counseling and / or coordination of care  A description of the counseling / coordination of care: plan of care

## 2017-11-09 LAB
ALBUMIN SERPL BCP-MCNC: 2.7 G/DL (ref 3.5–5)
ALP SERPL-CCNC: 51 U/L (ref 46–116)
ALT SERPL W P-5'-P-CCNC: 19 U/L (ref 12–78)
ANION GAP SERPL CALCULATED.3IONS-SCNC: 8 MMOL/L (ref 4–13)
AST SERPL W P-5'-P-CCNC: 20 U/L (ref 5–45)
BASOPHILS # BLD MANUAL: 0 THOUSAND/UL (ref 0–0.1)
BASOPHILS NFR MAR MANUAL: 0 % (ref 0–1)
BILIRUB SERPL-MCNC: 0.77 MG/DL (ref 0.2–1)
BUN SERPL-MCNC: 11 MG/DL (ref 5–25)
CALCIUM SERPL-MCNC: 8.6 MG/DL (ref 8.3–10.1)
CHLORIDE SERPL-SCNC: 108 MMOL/L (ref 100–108)
CO2 SERPL-SCNC: 26 MMOL/L (ref 21–32)
CREAT SERPL-MCNC: 0.83 MG/DL (ref 0.6–1.3)
EOSINOPHIL # BLD MANUAL: 0 THOUSAND/UL (ref 0–0.4)
EOSINOPHIL NFR BLD MANUAL: 0 % (ref 0–6)
ERYTHROCYTE [DISTWIDTH] IN BLOOD BY AUTOMATED COUNT: 13.3 % (ref 11.6–15.1)
GFR SERPL CREATININE-BSD FRML MDRD: 111 ML/MIN/1.73SQ M
GLUCOSE SERPL-MCNC: 134 MG/DL (ref 65–140)
HCT VFR BLD AUTO: 36.5 % (ref 36.5–49.3)
HGB BLD-MCNC: 12.7 G/DL (ref 12–17)
LYMPHOCYTES # BLD AUTO: 0.72 THOUSAND/UL (ref 0.6–4.47)
LYMPHOCYTES # BLD AUTO: 4 % (ref 14–44)
MCH RBC QN AUTO: 29.7 PG (ref 26.8–34.3)
MCHC RBC AUTO-ENTMCNC: 34.8 G/DL (ref 31.4–37.4)
MCV RBC AUTO: 86 FL (ref 82–98)
MONOCYTES # BLD AUTO: 0.54 THOUSAND/UL (ref 0–1.22)
MONOCYTES NFR BLD: 3 % (ref 4–12)
NEUTROPHILS # BLD MANUAL: 16.64 THOUSAND/UL (ref 1.85–7.62)
NEUTS SEG NFR BLD AUTO: 93 % (ref 43–75)
NRBC BLD AUTO-RTO: 0 /100 WBCS
PLATELET # BLD AUTO: 165 THOUSANDS/UL (ref 149–390)
PLATELET BLD QL SMEAR: ADEQUATE
PMV BLD AUTO: 11.3 FL (ref 8.9–12.7)
POTASSIUM SERPL-SCNC: 3.8 MMOL/L (ref 3.5–5.3)
PROT SERPL-MCNC: 6.6 G/DL (ref 6.4–8.2)
RBC # BLD AUTO: 4.27 MILLION/UL (ref 3.88–5.62)
RBC MORPH BLD: NORMAL
SODIUM SERPL-SCNC: 142 MMOL/L (ref 136–145)
WBC # BLD AUTO: 17.89 THOUSAND/UL (ref 4.31–10.16)

## 2017-11-09 PROCEDURE — 85007 BL SMEAR W/DIFF WBC COUNT: CPT | Performed by: STUDENT IN AN ORGANIZED HEALTH CARE EDUCATION/TRAINING PROGRAM

## 2017-11-09 PROCEDURE — 94762 N-INVAS EAR/PLS OXIMTRY CONT: CPT

## 2017-11-09 PROCEDURE — 85027 COMPLETE CBC AUTOMATED: CPT | Performed by: STUDENT IN AN ORGANIZED HEALTH CARE EDUCATION/TRAINING PROGRAM

## 2017-11-09 PROCEDURE — 80053 COMPREHEN METABOLIC PANEL: CPT | Performed by: STUDENT IN AN ORGANIZED HEALTH CARE EDUCATION/TRAINING PROGRAM

## 2017-11-09 RX ORDER — POTASSIUM CHLORIDE 20 MEQ/1
40 TABLET, EXTENDED RELEASE ORAL ONCE
Status: COMPLETED | OUTPATIENT
Start: 2017-11-09 | End: 2017-11-09

## 2017-11-09 RX ADMIN — SODIUM CHLORIDE, SODIUM LACTATE, POTASSIUM CHLORIDE, AND CALCIUM CHLORIDE 75 ML/HR: .6; .31; .03; .02 INJECTION, SOLUTION INTRAVENOUS at 20:11

## 2017-11-09 RX ADMIN — METHOCARBAMOL 500 MG: 500 TABLET ORAL at 05:46

## 2017-11-09 RX ADMIN — METHOCARBAMOL 500 MG: 500 TABLET ORAL at 17:27

## 2017-11-09 RX ADMIN — METRONIDAZOLE 500 MG: 500 INJECTION, SOLUTION INTRAVENOUS at 09:04

## 2017-11-09 RX ADMIN — METHOCARBAMOL 500 MG: 500 TABLET ORAL at 11:49

## 2017-11-09 RX ADMIN — POTASSIUM CHLORIDE 40 MEQ: 1500 TABLET, EXTENDED RELEASE ORAL at 09:04

## 2017-11-09 RX ADMIN — METRONIDAZOLE 500 MG: 500 INJECTION, SOLUTION INTRAVENOUS at 15:41

## 2017-11-09 RX ADMIN — CIPROFLOXACIN 400 MG: 2 INJECTION, SOLUTION INTRAVENOUS at 09:04

## 2017-11-09 RX ADMIN — LIDOCAINE 1 PATCH: 50 PATCH CUTANEOUS at 09:05

## 2017-11-09 RX ADMIN — SODIUM CHLORIDE, SODIUM LACTATE, POTASSIUM CHLORIDE, AND CALCIUM CHLORIDE 75 ML/HR: .6; .31; .03; .02 INJECTION, SOLUTION INTRAVENOUS at 05:49

## 2017-11-09 RX ADMIN — OXYCODONE HYDROCHLORIDE 5 MG: 5 TABLET ORAL at 20:05

## 2017-11-09 RX ADMIN — CIPROFLOXACIN 400 MG: 2 INJECTION, SOLUTION INTRAVENOUS at 20:05

## 2017-11-09 RX ADMIN — OXYCODONE HYDROCHLORIDE 5 MG: 5 TABLET ORAL at 09:13

## 2017-11-09 NOTE — MEDICAL STUDENT
Progress Note - General Surgery   Raymundo Staton 42 y o  male MRN: [id number]  Unit/Bed#: [facility] 805-01 Encounter: [id number]    Assessment:  41 yo male POD#1 s/p open subtotal fenestrating cholecystectomy  Plan:  -continue clear diet  -continue cipro/flagyl x5 days  -d/c estefania    Subjective/Objective     Subjective: Patient reports that he has had mild to moderate pain overnight  Denies nausea/vomiting  No flatus/BM  Objective:     Blood pressure 136/75, pulse 94, temperature 99 5 °F (37 5 °C), temperature source Oral, resp  rate 18, height 6' 7" (2 007 m), weight 116 kg (255 lb 11 7 oz), SpO2 98 %  ,Body mass index is 28 81 kg/m²        Intake/Output Summary (Last 24 hours) at 11/09/17 0550  Last data filed at 11/09/17 0549   Gross per 24 hour   Intake          7015 83 ml   Output             4115 ml   Net          2900 83 ml       Invasive Devices     Peripheral Intravenous Line            Peripheral IV 11/07/17 Left Forearm 1 day    Peripheral IV 11/07/17 Right Forearm 1 day          Drain            Closed/Suction Drain RUQ Bulb 19 Fr  less than 1 day    Urethral Catheter Latex 16 Fr  less than 1 day                Physical Exam: General appearance: alert, appears stated age and cooperative  Lungs: clear to auscultation bilaterally  Heart: regular rate and rhythm  Abdomen: dressings intact and clean, no surrounding erythema/warmth, mild tenderness  Neurologic: Grossly normal    Lab Results   Component Value Date    WBC 22 55 (H) 11/08/2017    HGB 15 3 11/08/2017    HCT 42 5 11/08/2017    MCV 85 11/08/2017     11/08/2017     Lab Results   Component Value Date    GLUCOSE 129 11/08/2017    CALCIUM 8 6 11/08/2017     11/08/2017    K 4 0 11/08/2017    CO2 26 11/08/2017     11/08/2017    BUN 11 11/08/2017    CREATININE 1 04 11/08/2017

## 2017-11-10 ENCOUNTER — ANESTHESIA (INPATIENT)
Dept: GASTROENTEROLOGY | Facility: HOSPITAL | Age: 37
DRG: 853 | End: 2017-11-10
Payer: COMMERCIAL

## 2017-11-10 ENCOUNTER — HOSPITAL ENCOUNTER (OUTPATIENT)
Dept: RADIOLOGY | Facility: HOSPITAL | Age: 37
Discharge: HOME | DRG: 853 | End: 2017-11-10
Payer: COMMERCIAL

## 2017-11-10 ENCOUNTER — ANESTHESIA EVENT (INPATIENT)
Dept: GASTROENTEROLOGY | Facility: HOSPITAL | Age: 37
DRG: 853 | End: 2017-11-10
Payer: COMMERCIAL

## 2017-11-10 PROBLEM — K83.9 BILE LEAK: Status: ACTIVE | Noted: 2017-11-07

## 2017-11-10 LAB
ALBUMIN SERPL BCP-MCNC: 2.6 G/DL (ref 3.5–5)
ALP SERPL-CCNC: 51 U/L (ref 46–116)
ALT SERPL W P-5'-P-CCNC: 20 U/L (ref 12–78)
ANION GAP SERPL CALCULATED.3IONS-SCNC: 7 MMOL/L (ref 4–13)
AST SERPL W P-5'-P-CCNC: 17 U/L (ref 5–45)
BACTERIA SPEC BFLD CULT: ABNORMAL
BASOPHILS # BLD AUTO: 0.01 THOUSANDS/ΜL (ref 0–0.1)
BASOPHILS NFR BLD AUTO: 0 % (ref 0–1)
BILIRUB SERPL-MCNC: 0.46 MG/DL (ref 0.2–1)
BUN SERPL-MCNC: 13 MG/DL (ref 5–25)
CALCIUM SERPL-MCNC: 7.4 MG/DL (ref 8.3–10.1)
CHLORIDE SERPL-SCNC: 107 MMOL/L (ref 100–108)
CO2 SERPL-SCNC: 27 MMOL/L (ref 21–32)
CREAT SERPL-MCNC: 0.78 MG/DL (ref 0.6–1.3)
EOSINOPHIL # BLD AUTO: 0.03 THOUSAND/ΜL (ref 0–0.61)
EOSINOPHIL NFR BLD AUTO: 0 % (ref 0–6)
ERYTHROCYTE [DISTWIDTH] IN BLOOD BY AUTOMATED COUNT: 13.4 % (ref 11.6–15.1)
GFR SERPL CREATININE-BSD FRML MDRD: 114 ML/MIN/1.73SQ M
GLUCOSE SERPL-MCNC: 119 MG/DL (ref 65–140)
GRAM STN SPEC: ABNORMAL
GRAM STN SPEC: ABNORMAL
HCT VFR BLD AUTO: 33.2 % (ref 36.5–49.3)
HGB BLD-MCNC: 11.3 G/DL (ref 12–17)
LYMPHOCYTES # BLD AUTO: 1.55 THOUSANDS/ΜL (ref 0.6–4.47)
LYMPHOCYTES NFR BLD AUTO: 15 % (ref 14–44)
MAGNESIUM SERPL-MCNC: 2.2 MG/DL (ref 1.6–2.6)
MCH RBC QN AUTO: 29.5 PG (ref 26.8–34.3)
MCHC RBC AUTO-ENTMCNC: 34 G/DL (ref 31.4–37.4)
MCV RBC AUTO: 87 FL (ref 82–98)
MONOCYTES # BLD AUTO: 0.77 THOUSAND/ΜL (ref 0.17–1.22)
MONOCYTES NFR BLD AUTO: 8 % (ref 4–12)
NEUTROPHILS # BLD AUTO: 7.85 THOUSANDS/ΜL (ref 1.85–7.62)
NEUTS SEG NFR BLD AUTO: 77 % (ref 43–75)
NRBC BLD AUTO-RTO: 0 /100 WBCS
PLATELET # BLD AUTO: 194 THOUSANDS/UL (ref 149–390)
PMV BLD AUTO: 11.6 FL (ref 8.9–12.7)
POTASSIUM SERPL-SCNC: 3.8 MMOL/L (ref 3.5–5.3)
PROT SERPL-MCNC: 6.5 G/DL (ref 6.4–8.2)
RBC # BLD AUTO: 3.83 MILLION/UL (ref 3.88–5.62)
SODIUM SERPL-SCNC: 141 MMOL/L (ref 136–145)
WBC # BLD AUTO: 10.23 THOUSAND/UL (ref 4.31–10.16)

## 2017-11-10 PROCEDURE — C2617 STENT, NON-COR, TEM W/O DEL: HCPCS | Performed by: INTERNAL MEDICINE

## 2017-11-10 PROCEDURE — 85025 COMPLETE CBC W/AUTO DIFF WBC: CPT | Performed by: SURGERY

## 2017-11-10 PROCEDURE — 0F798DZ DILATION OF COMMON BILE DUCT WITH INTRALUMINAL DEVICE, VIA NATURAL OR ARTIFICIAL OPENING ENDOSCOPIC: ICD-10-PCS | Performed by: INTERNAL MEDICINE

## 2017-11-10 PROCEDURE — 74328 X-RAY BILE DUCT ENDOSCOPY: CPT

## 2017-11-10 PROCEDURE — C1769 GUIDE WIRE: HCPCS | Performed by: INTERNAL MEDICINE

## 2017-11-10 PROCEDURE — 80053 COMPREHEN METABOLIC PANEL: CPT | Performed by: SURGERY

## 2017-11-10 PROCEDURE — 83735 ASSAY OF MAGNESIUM: CPT | Performed by: SURGERY

## 2017-11-10 DEVICE — BILIARY STENT
Type: IMPLANTABLE DEVICE | Site: BILE DUCT | Status: FUNCTIONAL
Brand: ADVANIX™ BILIARY

## 2017-11-10 RX ORDER — FENTANYL CITRATE 50 UG/ML
INJECTION, SOLUTION INTRAMUSCULAR; INTRAVENOUS AS NEEDED
Status: DISCONTINUED | OUTPATIENT
Start: 2017-11-10 | End: 2017-11-10 | Stop reason: SURG

## 2017-11-10 RX ORDER — ONDANSETRON 2 MG/ML
INJECTION INTRAMUSCULAR; INTRAVENOUS AS NEEDED
Status: DISCONTINUED | OUTPATIENT
Start: 2017-11-10 | End: 2017-11-10 | Stop reason: SURG

## 2017-11-10 RX ORDER — SUCCINYLCHOLINE CHLORIDE 20 MG/ML
INJECTION INTRAMUSCULAR; INTRAVENOUS AS NEEDED
Status: DISCONTINUED | OUTPATIENT
Start: 2017-11-10 | End: 2017-11-10 | Stop reason: SURG

## 2017-11-10 RX ORDER — PROPOFOL 10 MG/ML
INJECTION, EMULSION INTRAVENOUS AS NEEDED
Status: DISCONTINUED | OUTPATIENT
Start: 2017-11-10 | End: 2017-11-10 | Stop reason: SURG

## 2017-11-10 RX ORDER — LIDOCAINE HYDROCHLORIDE 10 MG/ML
INJECTION, SOLUTION INFILTRATION; PERINEURAL AS NEEDED
Status: DISCONTINUED | OUTPATIENT
Start: 2017-11-10 | End: 2017-11-10 | Stop reason: SURG

## 2017-11-10 RX ADMIN — METHOCARBAMOL 500 MG: 500 TABLET ORAL at 18:51

## 2017-11-10 RX ADMIN — LIDOCAINE 1 PATCH: 50 PATCH CUTANEOUS at 08:23

## 2017-11-10 RX ADMIN — METRONIDAZOLE 500 MG: 500 INJECTION, SOLUTION INTRAVENOUS at 08:23

## 2017-11-10 RX ADMIN — SODIUM CHLORIDE, SODIUM LACTATE, POTASSIUM CHLORIDE, AND CALCIUM CHLORIDE 75 ML/HR: .6; .31; .03; .02 INJECTION, SOLUTION INTRAVENOUS at 20:10

## 2017-11-10 RX ADMIN — METHOCARBAMOL 500 MG: 500 TABLET ORAL at 01:12

## 2017-11-10 RX ADMIN — METRONIDAZOLE 500 MG: 500 INJECTION, SOLUTION INTRAVENOUS at 16:50

## 2017-11-10 RX ADMIN — SODIUM CHLORIDE, SODIUM LACTATE, POTASSIUM CHLORIDE, AND CALCIUM CHLORIDE: .6; .31; .03; .02 INJECTION, SOLUTION INTRAVENOUS at 12:32

## 2017-11-10 RX ADMIN — LIDOCAINE HYDROCHLORIDE 50 MG: 10 INJECTION, SOLUTION INFILTRATION; PERINEURAL at 12:56

## 2017-11-10 RX ADMIN — SUCCINYLCHOLINE CHLORIDE 100 MG: 20 INJECTION, SOLUTION INTRAMUSCULAR; INTRAVENOUS at 12:57

## 2017-11-10 RX ADMIN — ONDANSETRON 4 MG: 2 INJECTION INTRAMUSCULAR; INTRAVENOUS at 20:10

## 2017-11-10 RX ADMIN — FENTANYL CITRATE 100 MCG: 50 INJECTION, SOLUTION INTRAMUSCULAR; INTRAVENOUS at 12:56

## 2017-11-10 RX ADMIN — CIPROFLOXACIN 400 MG: 2 INJECTION, SOLUTION INTRAVENOUS at 10:18

## 2017-11-10 RX ADMIN — METRONIDAZOLE 500 MG: 500 INJECTION, SOLUTION INTRAVENOUS at 01:13

## 2017-11-10 RX ADMIN — METHOCARBAMOL 500 MG: 500 TABLET ORAL at 06:49

## 2017-11-10 RX ADMIN — FENTANYL CITRATE 50 MCG: 50 INJECTION, SOLUTION INTRAMUSCULAR; INTRAVENOUS at 13:33

## 2017-11-10 RX ADMIN — METHOCARBAMOL 500 MG: 500 TABLET ORAL at 14:58

## 2017-11-10 RX ADMIN — FENTANYL CITRATE 50 MCG: 50 INJECTION, SOLUTION INTRAMUSCULAR; INTRAVENOUS at 13:16

## 2017-11-10 RX ADMIN — PROPOFOL 200 MG: 10 INJECTION, EMULSION INTRAVENOUS at 12:56

## 2017-11-10 RX ADMIN — IOHEXOL 6 ML: 240 INJECTION, SOLUTION INTRATHECAL; INTRAVASCULAR; INTRAVENOUS; ORAL at 13:47

## 2017-11-10 RX ADMIN — DEXAMETHASONE SODIUM PHOSPHATE 10 MG: 10 INJECTION INTRAMUSCULAR; INTRAVENOUS at 13:27

## 2017-11-10 RX ADMIN — CIPROFLOXACIN 400 MG: 2 INJECTION, SOLUTION INTRAVENOUS at 21:02

## 2017-11-10 RX ADMIN — ONDANSETRON 4 MG: 2 INJECTION INTRAMUSCULAR; INTRAVENOUS at 13:30

## 2017-11-10 NOTE — ANESTHESIA PREPROCEDURE EVALUATION
Review of Systems/Medical History  Patient summary reviewed  Chart reviewed  No history of anesthetic complications     Cardiovascular  Exercise tolerance: good,     Pulmonary       GI/Hepatic            Endo/Other     GYN       Hematology   Musculoskeletal       Neurology   Psychology           Physical Exam    Airway    Mallampati score: I  TM Distance: >3 FB  Neck ROM: full     Dental   No notable dental hx     Cardiovascular      Pulmonary      Other Findings        Anesthesia Plan  ASA Score- 2       Anesthesia Type- general with ASA Monitors  Additional Monitors:   Airway Plan: ETT  Induction- intravenous  Informed Consent- Anesthetic plan and risks discussed with patient and spouse  I personally reviewed this patient with the CRNA  Discussed and agreed on the Anesthesia Plan with the PUSHPA Alvarenga

## 2017-11-10 NOTE — MEDICAL STUDENT
Progress Note - General Surgery   Kim Sherman 42 y o  male MRN: [id number]  Unit/Bed#: [facility] 805-01 Encounter: [id number]    Assessment:  41 yo male POD#2 s/p open subtotal fenestrating cholecystectomy on 11/8    Plan:  -Continue ALESSIA drain to bulb suction  -PRN pain control  -Continue cipro/flagyl 5 days total  -OOB and ambulating  -Advance diet today  -SQH/SCDs    Subjective/Objective     Subjective: Patient reports that he is having mild to moderate pain, primarily in the area of his incision  He reports that he slept well overnight and is tolerating clears with toast and crackers  Denies nausea, vomiting  Denies flatus or BM since surgery  Objective:     Blood pressure 142/80, pulse 84, temperature 99 1 °F (37 3 °C), temperature source Oral, resp  rate 18, height 6' 7" (2 007 m), weight 116 kg (255 lb 11 7 oz), SpO2 94 %  ,Body mass index is 28 81 kg/m²        Intake/Output Summary (Last 24 hours) at 11/10/17 5110  Last data filed at 11/10/17 0300   Gross per 24 hour   Intake           2617 5 ml   Output             4765 ml   Net          -2147 5 ml       Invasive Devices     Peripheral Intravenous Line            Peripheral IV 11/07/17 Right Forearm 2 days          Drain            Closed/Suction Drain RUQ Bulb 19 Fr  1 day                Physical Exam: General appearance: alert, appears stated age and cooperative  Lungs: normal respiratory effort  Abdomen: mild tenderness in RUQ surrounding incision; dressings clean, dry, intact; ALESSIA drain intact with bulb suction with bile-tinged fluid  Neurologic: Grossly normal    Lab, Imaging and other studies:  Lab Results   Component Value Date    WBC 10 23 (H) 11/10/2017    HGB 11 3 (L) 11/10/2017    HCT 33 2 (L) 11/10/2017    MCV 87 11/10/2017     11/10/2017     Lab Results   Component Value Date    GLUCOSE 134 11/09/2017    CALCIUM 8 6 11/09/2017     11/09/2017    K 3 8 11/09/2017    CO2 26 11/09/2017     11/09/2017    BUN 11 11/09/2017    CREATININE 0 83 11/09/2017       Nima Dubois, MS-3

## 2017-11-10 NOTE — ANESTHESIA POSTPROCEDURE EVALUATION
Post-Op Assessment Note      CV Status:  Stable    Mental Status:  Alert and awake    Hydration Status:  Euvolemic    PONV Controlled:  None    Airway Patency:  Patent  Airway: intubated    Post Op Vitals Reviewed: Yes          Staff: Anesthesiologist, with CRNAs           /90 (11/10/17 1351)    Temp 98 9 °F (37 2 °C) (11/10/17 1351)    Pulse 91 (11/10/17 1351)   Resp 18 (11/10/17 1351)    SpO2 94 % (11/10/17 1351)

## 2017-11-10 NOTE — OP NOTE
**** GI/ENDOSCOPY REPORT ****     PATIENT NAME: MARY COOK - VISIT ID:  Patient ID: [id number]   YOB: 1980     INTRODUCTION: Endoscopic Retrograde Cholangiopancreatography - A 42  year-old male patient presents for an inpatient Endoscopic Retrograde   Cholangiopancreatography at [address] [place] [address]  INDICATIONS: Bile duct leak  S/p open cholecystectomy on November 8, 2017  CONSENT:  The benefits, risks, and alternatives to the procedure were   discussed and informed consent was obtained from the patient  PREPARATION:  EKG, pulse, pulse oximetry and blood pressure were monitored   throughout the procedure  MEDICATIONS: Anesthesia-check records Indomethacin rectal suppository 100   mg before the procedure  PROCEDURE:  The endoscope was passed with ease through the mouth under   direct visualization and advanced to the duodenum  The scope was withdrawn   and the mucosa was carefully examined  FINDINGS:  The first attempt at cannulation (via the major papilla) of the   bile duct was successful and entered deeply with a tapered catheter with a   guidewire  Radiographs were taken  Biliary tract: There was moderately   severe extravasation of contrast at the level of the cystic duct  A 10 Fr   7 cm long angled plastic stent was placed in the area, with success  Pancreas: An examination of the pancreas was not attempted  Major   papilla: The major papilla was found in the normal position  The major   papilla appeared to be normal      COMPLICATIONS: There were no complications  IMPRESSIONS:  Cannulation via the major papilla  Moderately severe   extravasation in the cystic duct  Stent placement was performed  A   pancreatogram not attempted  Major papilla found in the normal position  Major papilla normal      RECOMMENDATIONS: Call Dr Elenita Hernandez [phone] with questions or   problems   Follow-up appointment with Dr Elenita Hernandez in 2 weeks  EGD   recommended in a 8-12 week for stent removal      ESTIMATED BLOOD LOSS:     PROCEDURE CODES: 16203 - ERCP with stent placement     ICD-9 Codes: 576 8 Other specified disorders of biliary tract 459 0   Hemorrhage, unspecified     ICD-10 Codes: K83 3 Fistula of bile duct     PERFORMED BY: BELKIS Ashley  on 11/10/2017  Version 1, electronically signed by BELKIS Ashley  on   11/10/2017 at 13:39

## 2017-11-10 NOTE — CONSULTS
Consultation - [address] [facility]  Dipak Blackburn 42 y o  male MRN: [id number]  Unit/Bed#: [facility] Encounter: [id number]        ASSESSMENT/PLAN:   Bile leak    Pt was admitted with acute cholecystitis  He had a perforated gangrenous GB & a remnant was left  His ALESSIA is draining bile  We will attempt ERCP today w/ stenting to hopefully allow this to close & drain internally  This was discussed with the pt who is in agreement  I also discussed with Dr Lee Parks who will be helping us out to perform the procedure  -NPO  -ERCP today    Consults    Reason for Consult / Principal Problem: Bile leak    HPI: Dipak Blackburn is a 42y o  year old male who was admitted with RUQ abd pain  He states it started on Sunday & progressively got worse  He also had a fever & came to the hospital  He had a CT showing acute cholecystitis & was taken to the OR on 11/8  His GB was gangrenous, perforated, bile was seen & reportedly a remnant was left  He has a ALESSIA in placed which is draining bile  We were asked to do an ERCP with stent placement  He complains of some abd discomfort in the RUQ but much improved compared to before surgery  He does have some nausea, no vomiting  He is belching but no flatus from below  His WBC has been decreasing on Cipro & Flagyl & is almost WNL"s  Review of Systems: as per HPI  Review of Systems   All other systems reviewed and are negative        Historical Information   Past Medical History:   Diagnosis Date    Bile duct abnormality     leaking     Past Surgical History:   Procedure Laterality Date    CHOLECYSTECTOMY LAPAROSCOPIC N/A 11/8/2017    Procedure: CHOLECYSTECTOMY LAPAROSCOPIC, CONVERTED TO OPEN CHOLECYSTECTOMY, REPAIR UMBLICAL HERNIA  AND LYSIS OF ADHESIONS;  Surgeon: Ambika Odom MD;  Location: [facility];  Service: General    KNEE ARTHROSCOPY      LIPOMA RESECTION      neck    TONSILLECTOMY       Social History   History   Alcohol Use    Yes     Comment: occasional History   Drug Use No     History   Smoking Status    Never Smoker   Smokeless Tobacco    Never Used     History reviewed  No pertinent family history  Meds/Allergies     No prescriptions prior to admission  Current Facility-Administered Medications   Medication Dose Route Frequency    acetaminophen (TYLENOL) tablet 650 mg  650 mg Oral Q6H PRN    ciprofloxacin (CIPRO) IVPB (premix) 400 mg  400 mg Intravenous Q12H    HYDROmorphone (DILAUDID) 1 mg/mL injection 0 5 mg  0 5 mg Intravenous Q3H PRN    lactated ringers infusion  75 mL/hr Intravenous Continuous    lidocaine (LIDODERM) 5 % patch 1 patch  1 patch Transdermal Daily    methocarbamol (ROBAXIN) tablet 500 mg  500 mg Oral Q6H [address]    metroNIDAZOLE (FLAGYL) IVPB (premix) 500 mg  500 mg Intravenous Q8H    ondansetron (ZOFRAN) injection 4 mg  4 mg Intravenous Q6H PRN    oxyCODONE (ROXICODONE) immediate release tablet 10 mg  10 mg Oral Q4H PRN    oxyCODONE (ROXICODONE) IR tablet 5 mg  5 mg Oral Q4H PRN       Allergies   Allergen Reactions    Penicillins Hives    Percocet [Oxycodone-Acetaminophen] GI Intolerance       Objective     Blood pressure 128/83, pulse 81, temperature 97 7 °F (36 5 °C), temperature source Tympanic, resp  rate 18, height 6' 7" (2 007 m), weight 113 kg (250 lb), SpO2 94 %  Intake/Output Summary (Last 24 hours) at 11/10/17 1147  Last data filed at 11/10/17 1043   Gross per 24 hour   Intake             2820 ml   Output             4190 ml   Net            -1370 ml       PHYSICAL EXAM     Physical Exam   Constitutional: He is oriented to person, place, and time  He appears well-developed and well-nourished  HENT:   Head: Normocephalic and atraumatic  Eyes: Conjunctivae are normal    Neck: Normal range of motion  Cardiovascular: Normal rate and regular rhythm  Pulmonary/Chest: Effort normal and breath sounds normal    Abdominal: Soft     Hypoactive BS, mildly distended; appropriately TTP; incision C/D/I Musculoskeletal: Normal range of motion  He exhibits no edema  Neurological: He is alert and oriented to person, place, and time  Skin: Skin is warm and dry  Psychiatric: He has a normal mood and affect  His behavior is normal        Lab Results:   CBC: Lab Results   Component Value Date    WBC 10 23 (H) 11/10/2017    HGB 11 3 (L) 11/10/2017    HCT 33 2 (L) 11/10/2017    MCV 87 11/10/2017     11/10/2017    MCH 29 5 11/10/2017    MCHC 34 0 11/10/2017    RDW 13 4 11/10/2017    MPV 11 6 11/10/2017    NRBC 0 11/10/2017   ,   CMP: Lab Results   Component Value Date     11/10/2017    K 3 8 11/10/2017     11/10/2017    CO2 27 11/10/2017    ANIONGAP 7 11/10/2017    BUN 13 11/10/2017    CREATININE 0 78 11/10/2017    GLUCOSE 119 11/10/2017    CALCIUM 7 4 (L) 11/10/2017    AST 17 11/10/2017    ALT 20 11/10/2017    ALKPHOS 51 11/10/2017    PROT 6 5 11/10/2017    ALBUMIN 2 6 (L) 11/10/2017    BILITOT 0 46 11/10/2017    EGFR 114 11/10/2017   ,   Imaging Studies: I have personally reviewed pertinent reports  CT abd/pelvis:  1  Findings suspicious for acute cholecystitis with inflammation extending into the mesentery  Moderate amount of ascites within the pelvis and tracking the paracolic gutters is noted      2  Mild wall thickening of the hepatic flexure and gastric antrum, likely reactive changes related to inflammation involving the gallbladder

## 2017-11-11 ENCOUNTER — APPOINTMENT (INPATIENT)
Dept: RADIOLOGY | Facility: HOSPITAL | Age: 37
DRG: 853 | End: 2017-11-11
Payer: COMMERCIAL

## 2017-11-11 LAB
ALBUMIN SERPL BCP-MCNC: 2.7 G/DL (ref 3.5–5)
ALP SERPL-CCNC: 60 U/L (ref 46–116)
ALT SERPL W P-5'-P-CCNC: 22 U/L (ref 12–78)
ANION GAP SERPL CALCULATED.3IONS-SCNC: 8 MMOL/L (ref 4–13)
AST SERPL W P-5'-P-CCNC: 15 U/L (ref 5–45)
BACTERIA SPEC ANAEROBE CULT: NO GROWTH
BASOPHILS # BLD AUTO: 0 THOUSANDS/ΜL (ref 0–0.1)
BASOPHILS NFR BLD AUTO: 0 % (ref 0–1)
BILIRUB SERPL-MCNC: 0.71 MG/DL (ref 0.2–1)
BUN SERPL-MCNC: 13 MG/DL (ref 5–25)
CALCIUM SERPL-MCNC: 8.7 MG/DL (ref 8.3–10.1)
CHLORIDE SERPL-SCNC: 107 MMOL/L (ref 100–108)
CO2 SERPL-SCNC: 28 MMOL/L (ref 21–32)
CREAT SERPL-MCNC: 0.72 MG/DL (ref 0.6–1.3)
EOSINOPHIL # BLD AUTO: 0 THOUSAND/ΜL (ref 0–0.61)
EOSINOPHIL NFR BLD AUTO: 0 % (ref 0–6)
ERYTHROCYTE [DISTWIDTH] IN BLOOD BY AUTOMATED COUNT: 13.4 % (ref 11.6–15.1)
GFR SERPL CREATININE-BSD FRML MDRD: 118 ML/MIN/1.73SQ M
GLUCOSE SERPL-MCNC: 121 MG/DL (ref 65–140)
HCT VFR BLD AUTO: 35.4 % (ref 36.5–49.3)
HGB BLD-MCNC: 12.6 G/DL (ref 12–17)
LYMPHOCYTES # BLD AUTO: 0.89 THOUSANDS/ΜL (ref 0.6–4.47)
LYMPHOCYTES NFR BLD AUTO: 11 % (ref 14–44)
MCH RBC QN AUTO: 30.4 PG (ref 26.8–34.3)
MCHC RBC AUTO-ENTMCNC: 35.6 G/DL (ref 31.4–37.4)
MCV RBC AUTO: 85 FL (ref 82–98)
MONOCYTES # BLD AUTO: 0.72 THOUSAND/ΜL (ref 0.17–1.22)
MONOCYTES NFR BLD AUTO: 9 % (ref 4–12)
NEUTROPHILS # BLD AUTO: 6.57 THOUSANDS/ΜL (ref 1.85–7.62)
NEUTS SEG NFR BLD AUTO: 80 % (ref 43–75)
NRBC BLD AUTO-RTO: 0 /100 WBCS
PLATELET # BLD AUTO: 239 THOUSANDS/UL (ref 149–390)
PMV BLD AUTO: 11 FL (ref 8.9–12.7)
POTASSIUM SERPL-SCNC: 3.8 MMOL/L (ref 3.5–5.3)
PROT SERPL-MCNC: 7 G/DL (ref 6.4–8.2)
RBC # BLD AUTO: 4.15 MILLION/UL (ref 3.88–5.62)
SODIUM SERPL-SCNC: 143 MMOL/L (ref 136–145)
WBC # BLD AUTO: 8.2 THOUSAND/UL (ref 4.31–10.16)

## 2017-11-11 PROCEDURE — 80053 COMPREHEN METABOLIC PANEL: CPT | Performed by: SURGERY

## 2017-11-11 PROCEDURE — 74000 HB X-RAY EXAM OF ABDOMEN (SINGLE ANTEROPOSTERIOR VIEW): CPT

## 2017-11-11 PROCEDURE — 85025 COMPLETE CBC W/AUTO DIFF WBC: CPT | Performed by: SURGERY

## 2017-11-11 RX ORDER — BISACODYL 10 MG
10 SUPPOSITORY, RECTAL RECTAL ONCE
Status: COMPLETED | OUTPATIENT
Start: 2017-11-11 | End: 2017-11-11

## 2017-11-11 RX ORDER — HEPARIN SODIUM 5000 [USP'U]/ML
5000 INJECTION, SOLUTION INTRAVENOUS; SUBCUTANEOUS EVERY 8 HOURS SCHEDULED
Status: DISCONTINUED | OUTPATIENT
Start: 2017-11-11 | End: 2017-11-14 | Stop reason: HOSPADM

## 2017-11-11 RX ORDER — POTASSIUM CHLORIDE 29.8 MG/ML
40 INJECTION INTRAVENOUS ONCE
Status: COMPLETED | OUTPATIENT
Start: 2017-11-11 | End: 2017-11-13

## 2017-11-11 RX ORDER — DEXTROSE, SODIUM CHLORIDE, AND POTASSIUM CHLORIDE 5; .45; .15 G/100ML; G/100ML; G/100ML
100 INJECTION INTRAVENOUS CONTINUOUS
Status: DISCONTINUED | OUTPATIENT
Start: 2017-11-11 | End: 2017-11-12

## 2017-11-11 RX ADMIN — METHOCARBAMOL 500 MG: 500 TABLET ORAL at 18:43

## 2017-11-11 RX ADMIN — LIDOCAINE 1 PATCH: 50 PATCH CUTANEOUS at 09:32

## 2017-11-11 RX ADMIN — ONDANSETRON 4 MG: 2 INJECTION INTRAMUSCULAR; INTRAVENOUS at 16:30

## 2017-11-11 RX ADMIN — CIPROFLOXACIN 400 MG: 2 INJECTION, SOLUTION INTRAVENOUS at 20:33

## 2017-11-11 RX ADMIN — METRONIDAZOLE 500 MG: 500 INJECTION, SOLUTION INTRAVENOUS at 07:26

## 2017-11-11 RX ADMIN — HEPARIN SODIUM 5000 UNITS: 5000 INJECTION, SOLUTION INTRAVENOUS; SUBCUTANEOUS at 21:12

## 2017-11-11 RX ADMIN — ACETAMINOPHEN 650 MG: 325 TABLET, FILM COATED ORAL at 16:23

## 2017-11-11 RX ADMIN — CIPROFLOXACIN 400 MG: 2 INJECTION, SOLUTION INTRAVENOUS at 09:00

## 2017-11-11 RX ADMIN — BISACODYL 10 MG: 10 SUPPOSITORY RECTAL at 09:33

## 2017-11-11 RX ADMIN — METRONIDAZOLE 500 MG: 500 INJECTION, SOLUTION INTRAVENOUS at 16:24

## 2017-11-11 RX ADMIN — DEXTROSE, SODIUM CHLORIDE, AND POTASSIUM CHLORIDE 100 ML/HR: 5; .45; .15 INJECTION INTRAVENOUS at 23:45

## 2017-11-11 RX ADMIN — METRONIDAZOLE 500 MG: 500 INJECTION, SOLUTION INTRAVENOUS at 23:57

## 2017-11-11 RX ADMIN — METHOCARBAMOL 500 MG: 500 TABLET ORAL at 23:56

## 2017-11-11 RX ADMIN — METHOCARBAMOL 500 MG: 500 TABLET ORAL at 11:29

## 2017-11-11 RX ADMIN — HEPARIN SODIUM 5000 UNITS: 5000 INJECTION, SOLUTION INTRAVENOUS; SUBCUTANEOUS at 14:34

## 2017-11-11 RX ADMIN — HYDROMORPHONE HYDROCHLORIDE 0.5 MG: 1 INJECTION, SOLUTION INTRAMUSCULAR; INTRAVENOUS; SUBCUTANEOUS at 00:07

## 2017-11-11 RX ADMIN — POTASSIUM CHLORIDE 40 MEQ: 400 INJECTION, SOLUTION INTRAVENOUS at 12:00

## 2017-11-11 RX ADMIN — DEXTROSE, SODIUM CHLORIDE, AND POTASSIUM CHLORIDE 100 ML/HR: 5; .45; .15 INJECTION INTRAVENOUS at 11:29

## 2017-11-11 RX ADMIN — METHOCARBAMOL 500 MG: 500 TABLET ORAL at 00:06

## 2017-11-11 RX ADMIN — METRONIDAZOLE 500 MG: 500 INJECTION, SOLUTION INTRAVENOUS at 00:06

## 2017-11-11 RX ADMIN — METHOCARBAMOL 500 MG: 500 TABLET ORAL at 05:28

## 2017-11-11 RX ADMIN — ONDANSETRON 4 MG: 2 INJECTION INTRAMUSCULAR; INTRAVENOUS at 06:40

## 2017-11-11 NOTE — PROGRESS NOTES
Progress Note - General Surgery  Carlos Kiran 42 y o  male MRN: [id number]  Unit/Bed#: [facility] Encounter: [id number]    Assessment:  42y o -year-old male with gangrenous cholecystitis   - s/p lap converted to open subtotal fenestrated cholecystectomy 11/8 Kaz Booker)    Now w/ bile leak    S/p ERCP w/ stent 11/10 Charlette Jorgensen)    Plan:  - has some distension with no flatus  - KUB reveals possible ileus but also a lot of colon distension  - will order suppository  - sips for now  - low threshold for NGT insertion  - OOB  - IS  - pain control    Aidan Meehan MD PGY-4  10:45 AM  11/11/17      Subjective:  Pain controlled  No flatus, had a very small bowel movement  Has hiccups  Has nausea, no emesis  Objective:  Patient Vitals for the past 24 hrs:   BP Temp Temp src Pulse Resp SpO2 Height Weight   11/11/17 0700 147/89 98 1 °F (36 7 °C) Oral 75 20 98 % - -   11/11/17 0300 148/83 98 7 °F (37 1 °C) Oral 76 18 97 % - -   11/10/17 2300 146/93 98 7 °F (37 1 °C) Oral 80 18 97 % - -   11/10/17 1900 130/78 98 8 °F (37 1 °C) Oral 80 18 98 % - -   11/10/17 1500 124/76 98 6 °F (37 °C) Oral 77 18 97 % - -   11/10/17 1421 146/89 98 9 °F (37 2 °C) Oral 81 18 96 % - -   11/10/17 1402 146/86 - - 88 16 93 % - -   11/10/17 1356 154/88 98 9 °F (37 2 °C) Tympanic 90 16 94 % - -   11/10/17 1351 154/90 98 9 °F (37 2 °C) Oral 91 18 94 % - -   11/10/17 1132 128/83 97 7 °F (36 5 °C) Tympanic 81 18 94 % 6' 7" (2 007 m) 113 kg (250 lb)          Diet Orders            Start     Ordered    11/11/17 1419  Diet NPO; Sips of clear liquids  Diet effective now     Question Answer Comment   Diet Type NPO    NPO Except: Sips of clear liquids    RD to adjust diet per protocol?  Yes        11/11/17 0758    11/10/17 1531  Dietary nutrition supplements  Once     Question Answer Comment   Select Supplement: Ensure Clear Bueno    Frequency Breakfast, Dinner        11/10/17 1530        Intake/Output Summary (Last 24 hours) at 11/11/17 1045  Last data filed at 11/11/17 0908   Gross per 24 hour   Intake             1700 ml   Output             1430 ml   Net              270 ml   UOP 2 1  ALESSIA 205 serosanguinous      Physical Exam:  General: nad  Cardiovascular: RRR  Respiratory: breath sounds b/l  Abdomen: soft, NT, mild distension, incision c/d/i  Extremities: no edema    Medications:    ciprofloxacin 400 mg Intravenous Q12H   lidocaine 1 patch Transdermal Daily   methocarbamol 500 mg Oral Q6H [address]   metroNIDAZOLE 500 mg Intravenous Q8H   potassium chloride 40 mEq Intravenous Once     dextrose 5 % and sodium chloride 0 45 % with KCl 20 mEq/L 100 mL/hr     acetaminophen 650 mg Q6H PRN   HYDROmorphone 0 5 mg Q3H PRN   ondansetron 4 mg Q6H PRN   oxyCODONE 10 mg Q4H PRN   oxyCODONE 5 mg Q4H PRN     Laboratory results:   CBC:   Lab Results   Component Value Date    WBC 8 20 11/11/2017    HGB 12 6 11/11/2017    HCT 35 4 (L) 11/11/2017    MCV 85 11/11/2017     11/11/2017    MCH 30 4 11/11/2017    MCHC 35 6 11/11/2017    RDW 13 4 11/11/2017    MPV 11 0 11/11/2017    NRBC 0 11/11/2017   , CMP:   Lab Results   Component Value Date     11/11/2017    K 3 8 11/11/2017     11/11/2017    CO2 28 11/11/2017    ANIONGAP 8 11/11/2017    BUN 13 11/11/2017    CREATININE 0 72 11/11/2017    GLUCOSE 121 11/11/2017    CALCIUM 8 7 11/11/2017    AST 15 11/11/2017    ALT 22 11/11/2017    ALKPHOS 60 11/11/2017    PROT 7 0 11/11/2017    ALBUMIN 2 7 (L) 11/11/2017    BILITOT 0 71 11/11/2017    EGFR 118 11/11/2017   , Coagulation: No results found for: PT, INR, APTT, Urinalysis: No results found for: COLORU, CLARITYU, SPECGRAV, PHUR, LEUKOCYTESUR, NITRITE, PROTEINUA, GLUCOSEU, KETONESU, BILIRUBINUR, BLOODU, Amylase: No results found for: AMYLASE, Lipase: No results found for: LIPASE    VTE Pharmacologic Prophylaxis: Heparin  VTE Mechanical Prophylaxis: sequential compression device

## 2017-11-11 NOTE — PROGRESS NOTES
Complains of abdominal pain, very little flatus  ALESSIA drainage is decreasing, becomes clear, 205 mL overnight    /89, pulse 75, temperature 98 1°   He is awake and comfortable  Abdomen:  Soft, distended, diffusely tender, very scant bowel sounds  ALESSIA drain with small amount of serous contents  Abdominal films from earlier today: postop ileus  Impressions/recommendations:  1  Acute cholecystitis, status post cholecystectomy  2   Bile leak  Status post successful ERCP/placement of biliary stent  3   Postop ileus  Encourage ambulation  Decrease pain medications  Follow-up in the office in 2 weeks  Anticipate removal of biliary stent in about 4 6-8 weeks

## 2017-11-12 LAB
ANION GAP SERPL CALCULATED.3IONS-SCNC: 6 MMOL/L (ref 4–13)
BACTERIA BLD CULT: NORMAL
BACTERIA BLD CULT: NORMAL
BUN SERPL-MCNC: 11 MG/DL (ref 5–25)
CALCIUM SERPL-MCNC: 8.3 MG/DL (ref 8.3–10.1)
CHLORIDE SERPL-SCNC: 106 MMOL/L (ref 100–108)
CO2 SERPL-SCNC: 27 MMOL/L (ref 21–32)
CREAT SERPL-MCNC: 0.7 MG/DL (ref 0.6–1.3)
GFR SERPL CREATININE-BSD FRML MDRD: 119 ML/MIN/1.73SQ M
GLUCOSE SERPL-MCNC: 110 MG/DL (ref 65–140)
MAGNESIUM SERPL-MCNC: 2.2 MG/DL (ref 1.6–2.6)
POTASSIUM SERPL-SCNC: 3.5 MMOL/L (ref 3.5–5.3)
SODIUM SERPL-SCNC: 139 MMOL/L (ref 136–145)

## 2017-11-12 PROCEDURE — 83735 ASSAY OF MAGNESIUM: CPT | Performed by: SURGERY

## 2017-11-12 PROCEDURE — 80048 BASIC METABOLIC PNL TOTAL CA: CPT | Performed by: SURGERY

## 2017-11-12 RX ORDER — POTASSIUM CHLORIDE 20 MEQ/1
20 TABLET, EXTENDED RELEASE ORAL ONCE
Status: COMPLETED | OUTPATIENT
Start: 2017-11-12 | End: 2017-11-12

## 2017-11-12 RX ADMIN — HEPARIN SODIUM 5000 UNITS: 5000 INJECTION, SOLUTION INTRAVENOUS; SUBCUTANEOUS at 05:18

## 2017-11-12 RX ADMIN — CIPROFLOXACIN 400 MG: 2 INJECTION, SOLUTION INTRAVENOUS at 08:59

## 2017-11-12 RX ADMIN — DEXTROSE, SODIUM CHLORIDE, AND POTASSIUM CHLORIDE 100 ML/HR: 5; .45; .15 INJECTION INTRAVENOUS at 11:10

## 2017-11-12 RX ADMIN — METHOCARBAMOL 500 MG: 500 TABLET ORAL at 11:30

## 2017-11-12 RX ADMIN — LIDOCAINE 1 PATCH: 50 PATCH CUTANEOUS at 09:24

## 2017-11-12 RX ADMIN — METHOCARBAMOL 500 MG: 500 TABLET ORAL at 18:06

## 2017-11-12 RX ADMIN — METHOCARBAMOL 500 MG: 500 TABLET ORAL at 05:18

## 2017-11-12 RX ADMIN — HEPARIN SODIUM 5000 UNITS: 5000 INJECTION, SOLUTION INTRAVENOUS; SUBCUTANEOUS at 22:13

## 2017-11-12 RX ADMIN — CIPROFLOXACIN 400 MG: 2 INJECTION, SOLUTION INTRAVENOUS at 19:26

## 2017-11-12 RX ADMIN — METRONIDAZOLE 500 MG: 500 INJECTION, SOLUTION INTRAVENOUS at 18:06

## 2017-11-12 RX ADMIN — HEPARIN SODIUM 5000 UNITS: 5000 INJECTION, SOLUTION INTRAVENOUS; SUBCUTANEOUS at 13:45

## 2017-11-12 RX ADMIN — POTASSIUM CHLORIDE 20 MEQ: 1500 TABLET, EXTENDED RELEASE ORAL at 13:45

## 2017-11-12 RX ADMIN — METRONIDAZOLE 500 MG: 500 INJECTION, SOLUTION INTRAVENOUS at 09:12

## 2017-11-12 NOTE — PROGRESS NOTES
Progress Note - General Surgery  Sabina Cheyenne 42 y o  male MRN: [id number]  Unit/Bed#: [facility] 805-01 Encounter: [id number]    Assessment:  42y o -year-old male with gangrenous cholecystitis   - s/p lap converted to open subtotal fenestrated cholecystectomy 11/8 Raf Jackson) Now w/ bile leak S/p ERCP w/ stent 11/10 Kristina Cummings)    Plan:  Clears toast and crackers  IVF  Continue ALESSIA drain  Pain control  IS/OOB/ambulate      Subjective:  ARINA  Having flatus and BM  No N/V  Walking  Tolerating clears  Objective:  Patient Vitals for the past 24 hrs:   BP Temp Temp src Pulse Resp SpO2   11/12/17 0300 139/72 98 6 °F (37 °C) Oral 78 18 94 %   11/11/17 2300 141/62 98 8 °F (37 1 °C) Oral 79 18 96 %   11/11/17 1500 141/69 99 1 °F (37 3 °C) Oral 83 18 96 %   11/11/17 1111 137/88 98 9 °F (37 2 °C) Oral 73 18 95 %   11/11/17 0700 147/89 98 1 °F (36 7 °C) Oral 75 20 98 %          Diet Orders            Start     Ordered    11/11/17 0759  Diet NPO; Sips of clear liquids  Diet effective now     Question Answer Comment   Diet Type NPO    NPO Except: Sips of clear liquids    RD to adjust diet per protocol?  Yes        11/11/17 0758    11/10/17 1531  Dietary nutrition supplements  Once     Question Answer Comment   Select Supplement: Ensure Clear Bueno    Frequency Breakfast, Dinner        11/10/17 1530          Intake/Output Summary (Last 24 hours) at 11/12/17 0656  Last data filed at 11/12/17 0501   Gross per 24 hour   Intake          2760 84 ml   Output             2075 ml   Net           685 84 ml   UOP 2 1  ALESSIA 205 serosanguinous      Physical Exam:  NAD  AAOX3  RRR  normal respiratory effort  soft, NT, mildly distended  Incision c/d/i with staples  no c/c/e    Medications:    ciprofloxacin 400 mg Intravenous Q12H   heparin (porcine) 5,000 Units Subcutaneous Q8H [address]   lidocaine 1 patch Transdermal Daily   methocarbamol 500 mg Oral Q6H EAR[facility]   metroNIDAZOLE 500 mg Intravenous Q8H       dextrose 5 % and sodium chloride 0 45 % with KCl 20 mEq/L 100 mL/hr Last Rate: 100 mL/hr (11/11/17 2345)       acetaminophen 650 mg Q6H PRN   HYDROmorphone 0 5 mg Q3H PRN   ondansetron 4 mg Q6H PRN   oxyCODONE 10 mg Q4H PRN   oxyCODONE 5 mg Q4H PRN     Laboratory results:   CBC:   No results found for: WBC, HGB, HCT, MCV, PLT, ADJUSTEDWBC, MCH, MCHC, RDW, MPV, NRBC, CMP:   Lab Results   Component Value Date     11/12/2017    K 3 5 11/12/2017     11/12/2017    CO2 27 11/12/2017    ANIONGAP 6 11/12/2017    BUN 11 11/12/2017    CREATININE 0 70 11/12/2017    GLUCOSE 110 11/12/2017    CALCIUM 8 3 11/12/2017    EGFR 119 11/12/2017   , Coagulation: No results found for: PT, INR, APTT, Urinalysis: No results found for: COLORU, CLARITYU, SPECGRAV, PHUR, LEUKOCYTESUR, NITRITE, PROTEINUA, GLUCOSEU, KETONESU, BILIRUBINUR, BLOODU, Amylase: No results found for: AMYLASE, Lipase: No results found for: LIPASE    VTE Pharmacologic Prophylaxis: Heparin  VTE Mechanical Prophylaxis: sequential compression device

## 2017-11-13 RX ORDER — LIDOCAINE 50 MG/G
1 PATCH TOPICAL DAILY
Status: DISCONTINUED | OUTPATIENT
Start: 2017-11-13 | End: 2017-11-14 | Stop reason: HOSPADM

## 2017-11-13 RX ADMIN — CIPROFLOXACIN 400 MG: 2 INJECTION, SOLUTION INTRAVENOUS at 08:45

## 2017-11-13 RX ADMIN — HEPARIN SODIUM 5000 UNITS: 5000 INJECTION, SOLUTION INTRAVENOUS; SUBCUTANEOUS at 13:46

## 2017-11-13 RX ADMIN — METHOCARBAMOL 500 MG: 500 TABLET ORAL at 13:46

## 2017-11-13 RX ADMIN — METHOCARBAMOL 500 MG: 500 TABLET ORAL at 23:01

## 2017-11-13 RX ADMIN — HEPARIN SODIUM 5000 UNITS: 5000 INJECTION, SOLUTION INTRAVENOUS; SUBCUTANEOUS at 06:58

## 2017-11-13 RX ADMIN — METHOCARBAMOL 500 MG: 500 TABLET ORAL at 17:44

## 2017-11-13 RX ADMIN — HEPARIN SODIUM 5000 UNITS: 5000 INJECTION, SOLUTION INTRAVENOUS; SUBCUTANEOUS at 21:29

## 2017-11-13 RX ADMIN — METRONIDAZOLE 500 MG: 500 INJECTION, SOLUTION INTRAVENOUS at 08:49

## 2017-11-13 RX ADMIN — METHOCARBAMOL 500 MG: 500 TABLET ORAL at 06:49

## 2017-11-13 RX ADMIN — METHOCARBAMOL 500 MG: 500 TABLET ORAL at 00:13

## 2017-11-13 RX ADMIN — METRONIDAZOLE 500 MG: 500 INJECTION, SOLUTION INTRAVENOUS at 00:14

## 2017-11-13 RX ADMIN — LIDOCAINE 1 PATCH: 50 PATCH TOPICAL at 08:45

## 2017-11-13 NOTE — CASE MANAGEMENT
[address] [place] [address] in the [facility] by Marcelino Woods for 2017  Network Utilization Review Department  Phone: [phone]; Fax [phone]  ATTENTION: The Network Utilization Review Department is now centralized for our 7 Facilities  Make a note that we have a new phone and fax numbers for our Department  Please call with any questions or concerns to [phone] and carefully follow the prompts so that you are directed to the right person  All voicemails are confidential  Fax any determinations, approvals, denials, and requests for initial or continue stay review clinical to [phone]  Due to HIGH CALL volume, it would be easier if you could please send faxed requests to expedite your requests and in part, help us provide discharge notifications faster      Continued Stay Review    Date/POD#: 11/10 - 11/13 POD # 3-5    Vital Signs: /88   Pulse 99   Temp 98 °F (36 7 °C) (Oral)   Resp 18   Ht 6' 7" (2 007 m)   Wt 113 kg (250 lb)   SpO2 93%   BMI 28 16 kg/m²     Medication:   Scheduled Meds:   heparin (porcine) 5,000 Units Subcutaneous Q8H [address]   lidocaine 1 patch Transdermal Daily   methocarbamol 500 mg Oral Q6H [address]     PRN Meds:   acetaminophen    HYDROmorphone    ondansetron    oxyCODONE  Indocin rectal supp x 1 11/10  Zofran x 1 11/10, x2 11/11    Abnormal Labs:    Christiano 7 4  Total Bili 2 7, 2 6, 2 7  10/10   WBC 4 31 - 10 16 Thousand/uL 10 23     RBC 3 88 - 5 62 Million/uL 3 83     Hemoglobin 12 0 - 17 0 g/dL 11 3     Hematocrit 36 5 - 49 3 % 33 2     10/8   Color, UA   Dk Yellow    Clarity, UA  Clear    Specific [place], UA 1 003 - 1 030 >1 045     pH, UA 4 5 - 8 0 6 0    Leukocytes, UA Negative Trace     Nitrite, UA Negative  Negative    Protein, UA Negative mg/dl 30 (1+)     Glucose, UA Negative mg/dl  Negative    Ketones, UA Negative mg/dl  Negative    Urobilinogen, UA 0 2, 1 0 E U /dl E U /dl 2 0       Body fluid culture - Growth in Broth culture only Beta Hemolytic Streptococcus Group B     Diagnostic Results:   11/11 Xray abd KUB - Probable generalized postoperative ileus     Age/Sex: 42 y o  male     Assessment/Plan:   11/11 - Surgery Progress Note POD #3  Impressions/recommendations:  1  Acute cholecystitis, status post cholecystectomy  2   Bile leak  Status post successful ERCP/placement of biliary stent  3   Postop ileus      Encourage ambulation  Decrease pain medications  Follow-up in the office in 2 weeks  Anticipate removal of biliary stent in about 4 6-8 weeks  _________________________________________________________  11/12 Surgery Progress Note POD #4  Assessment:  42y o -year-old male with gangrenous cholecystitis   - s/p lap converted to open subtotal fenestrated cholecystectomy 11/8 Ying Do)     Now w/ bile leak     S/p ERCP w/ stent 11/10 Breana Madsen)     Plan:  - able to tolerate PO, but minimal  - continue to trial PO  - possible discharge tomorrow if adequate PO intake  - OK to shower  ________________________________________________________  11/13 Surgery Progress Note POD #5   Assessment:  42y o -year-old male with gangrenous cholecystitis   - s/p lap converted to open subtotal fenestrated cholecystectomy 11/8 Ying Do)     Now w/ bile leak     S/p ERCP w/ stent 11/10 Breana Madsen)     Plan:  - able to tolerate PO, but minimal  - continue to trial PO  - possible discharge tomorrow if adequate PO intake  - OK to shower     Discharge Plan: Home when pain controlled and stable

## 2017-11-13 NOTE — PROGRESS NOTES
Progress Note - General Surgery  Raymundo Mclean 42 y o  male MRN: [id number]  Unit/Bed#: [facility] 805-01 Encounter: [id number]    Assessment:  42y o -year-old male with gangrenous cholecystitis   - s/p lap converted to open subtotal fenestrated cholecystectomy 11/8 Samreen Norman)    Now w/ bile leak    S/p ERCP w/ stent 11/10 Lord Flow)    Plan:  - able to tolerate PO, but minimal  - continue to trial PO  - possible discharge tomorrow if adequate PO intake  - OK to shower    Shakir Lagos MD PGY-4  6:23 AM  11/13/17      Subjective:  Pain controlled, is passing flatus  Had one BM  No nausea or vomiting  Felt full easily yesterday, minimal   Objective:  Patient Vitals for the past 24 hrs:   BP Temp Temp src Pulse Resp SpO2   11/13/17 0300 143/86 98 7 °F (37 1 °C) Oral 80 18 92 %   11/12/17 2300 161/94 99 4 °F (37 4 °C) Oral 88 18 97 %   11/12/17 1527 141/89 99 °F (37 2 °C) - 78 18 98 %   11/12/17 1114 137/84 98 7 °F (37 1 °C) Oral 91 18 97 %   11/12/17 0745 143/99 99 5 °F (37 5 °C) Oral 78 18 97 %          Diet Orders            Start     Ordered    11/12/17 1117  Diet Regular; Regular House  Diet effective now     Question Answer Comment   Diet Type Regular    Regular Regular House    RD to adjust diet per protocol?  Yes        11/12/17 1116    11/10/17 1531  Dietary nutrition supplements  Once     Question Answer Comment   Select Supplement: Ensure Clear Bueno    Frequency Breakfast, Dinner        11/10/17 1530          Intake/Output Summary (Last 24 hours) at 11/13/17 0623  Last data filed at 11/13/17 0300   Gross per 24 hour   Intake             1925 ml   Output             2260 ml   Net             -335 ml   UOP 2 2  ALESSIA 135 serosanguinous      Physical Exam:  General: nad  Cardiovascular: RRR  Respiratory: breath sounds b/l  Abdomen: soft, NT, mild distension, incision c/d/i  Extremities: no edema    Medications:    ciprofloxacin 400 mg Intravenous Q12H   heparin (porcine) 5,000 Units Subcutaneous [facility] [address] lidocaine 1 patch Transdermal Daily   methocarbamol 500 mg Oral Q6H [address]   metroNIDAZOLE 500 mg Intravenous Q8H        acetaminophen 650 mg Q6H PRN   HYDROmorphone 0 5 mg Q3H PRN   ondansetron 4 mg Q6H PRN   oxyCODONE 10 mg Q4H PRN   oxyCODONE 5 mg Q4H PRN     Laboratory results:   CBC:   No results found for: WBC, HGB, HCT, MCV, PLT, ADJUSTEDWBC, MCH, MCHC, RDW, MPV, NRBC, CMP:   No results found for: NA, K, CL, CO2, ANIONGAP, BUN, CREATININE, GLUCOSE, CALCIUM, AST, ALT, ALKPHOS, PROT, ALBUMIN, BILITOT, EGFR, Coagulation: No results found for: PT, INR, APTT, Urinalysis: No results found for: COLORU, CLARITYU, SPECGRAV, PHUR, LEUKOCYTESUR, NITRITE, PROTEINUA, GLUCOSEU, KETONESU, BILIRUBINUR, BLOODU, Amylase: No results found for: AMYLASE, Lipase: No results found for: LIPASE    VTE Pharmacologic Prophylaxis: Heparin  VTE Mechanical Prophylaxis: sequential compression device

## 2017-11-13 NOTE — MEDICAL STUDENT
Progress Note - General Surgery   Raymundo Lynch 42 y o  male MRN: [id number]  Unit/Bed#: [facility] 805-01 Encounter: [id number]    Assessment:  43 yo male POD#5 s/p open subtotal fenestrating cholecystectomy on 11/8 and s/p ERCP with stent on 11/10  Plan:  -continue regular diet  -continue ALESSIA drain  -PRN pain meds  -OOB/ambulate  -ABx x5 days complete today    Subjective/Objective     Subjective: Patient reports only mild pain overnight, and reports that he was able to sleep most of the night  He reports flatus and BMs, and has been OOB and ambulating  He has been tolerating a regular diet, and denies nausea/vomiting  Objective:    Blood pressure 143/86, pulse 80, temperature 98 7 °F (37 1 °C), temperature source Oral, resp  rate 18, height 6' 7" (2 007 m), weight 113 kg (250 lb), SpO2 92 %  ,Body mass index is 28 16 kg/m²        Intake/Output Summary (Last 24 hours) at 11/13/17 0552  Last data filed at 11/13/17 0300   Gross per 24 hour   Intake             1925 ml   Output             2260 ml   Net             -335 ml       Invasive Devices     Peripheral Intravenous Line            Peripheral IV 11/12/17 Right Forearm less than 1 day          Drain            Closed/Suction Drain RUQ Bulb 19 Fr  4 days                Physical Exam: General appearance: alert, appears stated age and cooperative  Lungs: normal respiratory effort  Abdomen: incision clean, dry, intact; ALESSIA drain intact with serosanguinous output; minimal tenderness to palpation  Neurologic: Grossly normal    Lab, Imaging and other studies:  Lab Results   Component Value Date    WBC 8 20 11/11/2017    HGB 12 6 11/11/2017    HCT 35 4 (L) 11/11/2017    MCV 85 11/11/2017     11/11/2017     Lab Results   Component Value Date    GLUCOSE 110 11/12/2017    CALCIUM 8 3 11/12/2017     11/12/2017    K 3 5 11/12/2017    CO2 27 11/12/2017     11/12/2017    BUN 11 11/12/2017    CREATININE 0 70 11/12/2017       Classie Heading, MS-3

## 2017-11-14 VITALS
HEIGHT: 78 IN | TEMPERATURE: 98.3 F | BODY MASS INDEX: 28.93 KG/M2 | HEART RATE: 77 BPM | SYSTOLIC BLOOD PRESSURE: 127 MMHG | WEIGHT: 250 LBS | OXYGEN SATURATION: 97 % | RESPIRATION RATE: 18 BRPM | DIASTOLIC BLOOD PRESSURE: 77 MMHG

## 2017-11-14 RX ORDER — METHOCARBAMOL 500 MG/1
500 TABLET, FILM COATED ORAL EVERY 6 HOURS PRN
Qty: 40 TABLET | Refills: 0 | Status: ON HOLD | OUTPATIENT
Start: 2017-11-14 | End: 2018-04-13

## 2017-11-14 RX ORDER — ACETAMINOPHEN 325 MG/1
650 TABLET ORAL EVERY 4 HOURS PRN
Qty: 30 TABLET | Refills: 0
Start: 2017-11-14 | End: 2017-12-14

## 2017-11-14 RX ADMIN — HEPARIN SODIUM 5000 UNITS: 5000 INJECTION, SOLUTION INTRAVENOUS; SUBCUTANEOUS at 06:23

## 2017-11-14 RX ADMIN — METHOCARBAMOL 500 MG: 500 TABLET ORAL at 06:23

## 2017-11-14 NOTE — DISCHARGE SUMMARY
Discharge Summary - General Surgery   Raymundo Ghotra 42 y o  male MRN: [id number]  [facility] Encounter: [id number]    Admission Date: 11/7/2017     Discharge Date: 11/14/2017     Admitting Diagnosis: Acute cholecystitis [K81 0]  Leukocytosis [D72 829]  Chest pain [R07 9]  Abdominal pain [R10 9]  Nausea & vomiting [R11 2]    Discharge Diagnosis: Acute cholecystitis    Attending and Service: Dr [facility]  Consulting Physician(s): Dr Hian Paula, Gastroenterology  Imaging and Procedures Performed:     1  Chest x-ray on 11/07/2017 showed no active pulmonary disease  2  CT scan of the abdomen and pelvis on 11/07/2017 showed findings suspicious for acute cholecystitis with inflammation extending into the mesentery; moderate amount of ascites within the pelvis and tracking the paracolic gutters is noted; mild wall thickening of the hepatic flexure and gastric antrum, likely reactive changes related to inflammation involving the gallbladder  3   Chest x-ray on 11/07/2017 showed the study somewhat limited by suboptimal inspiration; no acute pulmonary disease identified  4   Laparoscopic converted to open subtotal fenestrated cholecystectomy with ALESSIA drain placement on 11/08/2017 by Dr Mary Fong  5   ERCP with common bile duct stent placement on 11/10/2017 by Dr Hina Paula  6  Abdominal x-ray on 11/11/2017 showed a probable generalized postoperative ileus; status post cholecystectomy probable biliary stent placement  Pathology: Final surgical pathology pending at the time of discharge  Hospital Course: Dipak Blackburn is a 26-year-old male who presented with abdominal pain that started this on a prior to presentation  He had associated nausea and vomiting  He had low-grade fevers  Initially pain was generalized, but became more focal to his right upper abdomen as time passed    On his initial evaluation, he was tachycardic with a heart rate in the 120s and febrile with a temp to 102 1 with normal vital signs otherwise; his abdomen was soft and nondistended, but tender in his mid upper, right upper, and right lower abdomen with guarding along his entire right abdomen; the remainder of his exam was unremarkable  His initial workup included the above-noted CT scan of the abdomen and pelvis and chest x-ray  He was admitted to acute care surgery with acute cholecystitis and sepsis  He was kept NPO, started on aggressive IV fluid resuscitation with serial abdominal exams and an point evaluation, started on broad-spectrum IV antibiotics after cultures were obtained, placed on a pain control regimen, and operative intervention was recommended to him  He agreed to proceed with operative intervention and underwent a laparoscopic converted to open subtotal fenestrated cholecystectomy with ALESSIA drain placement on 11/08/2017  There were no intraoperative complications, and the patient tolerated the procedure well  Postoperatively, his diet was slowly advanced as tolerated  He did have a mild postoperative ileus, but was able to eventually tolerate an oral diet with return of normal bowel function  Once tolerating a p  o  diet, his IV fluids were discontinued and he was transitioned to an oral pain medication regimen  Completed a five-day course of antibiotics postoperatively  His ALESSIA drain ultimately began draining bilious appearing fluid and Gastroenterology was consulted  The GI service assisted in his management of a postoperative bile leak by performing the above-noted ERCP with common bile duct stent placement on 11/10/2017  He tolerated the procedure well  By 11/14/2017, he is deemed stable for discharge with a ALESSIA drain and planned outpatient follow-up as below  On discharge, he is instructed to follow-up with his primary care provider in the next one month to review the events of his recent hospitalization   He is instructed to follow-up with the [facility] as scheduled on 11/28/2017 at 8:30 AM  He may resume a regular diet  He will need GI follow-up for common bile duct stent removal  He should avoid lifting greater than 20 pounds and any strenuous physical exercise or activity or the next 2 weeks at least  He may shower daily, but should avoid tub baths or swimming for 2 weeks  He is instructed to call our office or return to the ER if develops a fever greater than 101, shaking chills, persistent nausea or vomiting, worsening or uncontrollable pain, and/or any increasing redness or purulent drainage from his incision  Condition at Discharge: good     Discharge instructions/Information to patient and family:   See after visit summary for information provided to patient and family  Provisions for Follow-Up Care:  See after visit summary for information related to follow-up care and any pertinent home health orders  Disposition: See After Visit Summary for discharge disposition information  Planned Readmission: No    Discharge Statement   I spent 30 minutes discharging the patient  This time was spent on the day of discharge  I had direct contact with the patient on the day of discharge  Additional documentation is required if more than 30 minutes were spent on discharge  Discharge Medications:  See after visit summary for reconciled discharge medications provided to patient and family        Edu Barajas PA-C  11/14/2017  9:23 AM

## 2017-11-14 NOTE — DISCHARGE INSTRUCTIONS
Acute Care Surgery Discharge Instructions    Please follow-up as instructed  If you do not already have a follow-up appointment, please call the office when you leave to schedule an appointment to be seen in 2-3 weeks for post-operative re-evaluation  Activity:  - No lifting greater than 20 pounds or strenuous physical activity or exercise for 2 weeks  - Walking and normal light activities are encouraged  - Normal daily activities including climbing steps are okay  - No driving until no longer using pain medications  Return to work:    - You may return to work in 2 weeks or sooner if you are feeling well enough  Diet:    - You may resume your normal diet  Wound Care:  - ALESSIA Drain care: Please milk drain mornings and nights (and as needed)  Empty as needed and record daily output  Bring output records to follow-up appointment  - May shower daily  No tub baths or swimming until cleared by your surgeon   - Wash incision gently with soap and water and pat dry  - Do not apply any creams or ointments unless instructed to do so by your surgeon  Medications:    - You may resume all of your regular medications, including blood thinners and aspirin, after going home unless otherwise instructed  Please refer to your discharge medication list for further details  - Please take the pain medications as directed  - You may become constipated, especially if taking pain medications  You may take any over the counter stool softeners or laxatives as needed  Examples: Milk of Magnesia, Colace, Senna  Additional Instructions:  - If you have any questions or concerns after discharge please call the office   - Call office or return to ER if fever greater than 101, chills, persistent nausea/vomiting, worsening/uncontrollable pain, and/or increasing redness or purulent/foul smelling drainage from incision(s)

## 2017-11-14 NOTE — PROGRESS NOTES
Progress Note - General Surgery   Raymundo Guillenping 42 y o  male MRN: [id number]  Unit/Bed#: [facility] 805-01 Encounter: [id number]    Assessment:  42y o -year-old male with gangrenous cholecystitis, s/p lap converted to open subtotal fenestrated cholecystectomy 11/8 David Baxter) with resultant bile leak  Now s/p drain placement  Doing well, stable  - 520 PO intake    Plan:  Continue regular diet  ALESSIA to remain in place  PRN pain control  D/C today  F/U w/ GI in 2 weeks, surgical team in 2 weeks  Antibiotics completed    Subjective/Objective    Chief Complaint: None    Subjective: Feeling well, tolerating diet, denies significant pain  Anticipaiting D/C today    Objective:     Blood pressure 131/82, pulse 79, temperature 98 7 °F (37 1 °C), temperature source Oral, resp  rate 18, height 6' 7" (2 007 m), weight 113 kg (250 lb), SpO2 95 %  ,Body mass index is 28 16 kg/m²  Intake/Output Summary (Last 24 hours) at 11/14/17 0547  Last data filed at 11/14/17 0300   Gross per 24 hour   Intake              520 ml   Output             1280 ml   Net             -760 ml       Invasive Devices     Peripheral Intravenous Line            Peripheral IV 11/12/17 Right Forearm 1 day          Drain            Closed/Suction Drain RUQ Bulb 19 Fr  5 days                Physical Exam:   Gen: A&O, NAD  Cardio: RRR  Lungs: CTAB  Abd: Soft, non distended, non tender  Staples c/d/i  Bilious drainage from ALESSIA      Lab, Imaging and other studies:I have personally reviewed pertinent lab results    , CBC: No results found for: WBC, HGB, HCT, MCV, PLT, ADJUSTEDWBC, MCH, MCHC, RDW, MPV, NRBC, CMP: No results found for: NA, K, CL, CO2, ANIONGAP, BUN, CREATININE, GLUCOSE, CALCIUM, AST, ALT, ALKPHOS, PROT, ALBUMIN, BILITOT, EGFR  VTE Pharmacologic Prophylaxis: Sequential compression device (Venodyne)   VTE Mechanical Prophylaxis: sequential compression device

## 2017-11-16 NOTE — CASE MANAGEMENT
Jaciel Worrell, RN Registered Nurse Signed   Case Management Date of Service: 11/13/2017  3:11 PM         []Hide copied text  [address] [facility] in the [facility] by [address] for 2017  Network Utilization Review Department  Phone: [phone]; Fax [phone]  ATTENTION: The Network Utilization Review Department is now centralized for our 7 Facilities  Make a note that we have a new phone and fax numbers for our Department  Please call with any questions or concerns to [phone] and carefully follow the prompts so that you are directed to the right person  All voicemails are confidential  Fax any determinations, approvals, denials, and requests for initial or continue stay review clinical to [phone]   Due to HIGH CALL volume, it would be easier if you could please send faxed requests to expedite your requests and in part, help us provide discharge notifications faster      Continued Stay Review     Date/POD#: 11/10 - 11/13 POD # 3-5     Vital Signs: /88   Pulse 99   Temp 98 °F (36 7 °C) (Oral)   Resp 18   Ht 6' 7" (2 007 m)   Wt 113 kg (250 lb)   SpO2 93%   BMI 28 16 kg/m²      Medication:   Scheduled Meds:   heparin (porcine) 5,000 Units Subcutaneous Q8H [facility]   lidocaine 1 patch Transdermal Daily   methocarbamol 500 mg Oral Q6H [facility]      PRN Meds:   acetaminophen    HYDROmorphone    ondansetron    oxyCODONE  Indocin rectal supp x 1 11/10  Zofran x 1 11/10, x2 11/11     Abnormal Labs:     Christiano 7 4  Total Bili 2 7, 2 6, 2 7  10/10   WBC 4 31 - 10 16 Thousand/uL 10 23     RBC 3 88 - 5 62 Million/uL 3 83     Hemoglobin 12 0 - 17 0 g/dL 11 3     Hematocrit 36 5 - 49 3 % 33 2     10/8   Color, UA    Dk Yellow    Clarity, UA   Clear    Specific [place], UA 1 003 - 1 030 >1 045     pH, UA 4 5 - 8 0 6 0    Leukocytes, UA Negative Trace     Nitrite, UA Negative  Negative    Protein, UA Negative mg/dl 30 (1+)     Glucose, UA Negative mg/dl [facility] Ketones, UA Negative mg/dl  Negative    Urobilinogen, UA 0 2, 1 0 E U /dl E U /dl 2 0        Body fluid culture - Growth in Broth culture only Beta Hemolytic Streptococcus Group B      Diagnostic Results:   11/11 Xray abd KUB - Probable generalized postoperative ileus     Age/Sex: 42 y o  male      Assessment/Plan:   11/11 - Surgery Progress Note POD #3  Impressions/recommendations:  1   Acute cholecystitis, status post cholecystectomy  2   Bile leak   Status post successful ERCP/placement of biliary stent  3   Postop ileus      Encourage ambulation  Decrease pain medications  Follow-up in the office in 2 weeks  Anticipate removal of biliary stent in about 4 6-8 weeks  _________________________________________________________  11/12 Surgery Progress Note POD #4  Assessment:  42y o -year-old male with gangrenous cholecystitis   - s/p lap converted to open subtotal fenestrated cholecystectomy 11/8 Tamika James)     Now w/ bile leak     S/p ERCP w/ stent 11/10 Claudia Justin)     Plan:  - able to tolerate PO, but minimal  - continue to trial PO  - possible discharge tomorrow if adequate PO intake  - OK to shower  ________________________________________________________  11/13 Surgery Progress Note POD #5   Assessment:  42y o -year-old male with gangrenous cholecystitis   - s/p lap converted to open subtotal fenestrated cholecystectomy 11/8 Tamika Jacob)     Now w/ bile leak     S/p ERCP w/ stent 11/10 Claudia Justin)     Plan:  - able to tolerate PO, but minimal  - continue to trial PO  - possible discharge tomorrow if adequate PO intake  - OK to shower     Discharge Plan: Home when pain controlled and stable                         Notification of Discharge  This is a Notification of Discharge from our facility [facility]  Please be advised that this patient has been discharge from our facility  Below you will find the admission and discharge date and time including the patients disposition      PRESENTATION DATE: 11/7/2017  8:47 PM  IP ADMISSION DATE: 11/7/17 2202  DISCHARGE DATE: 11/14/2017 10:50 AM  DISPOSITION: Home/Self Care    [address] [facility] in the [facility] by [address] for 2017  Network Utilization Review Department  Phone: [phone]; Fax [phone]  ATTENTION: The Network Utilization Review Department is now centralized for our 7 Facilities  Make a note that we have a new phone and fax numbers for our Department  Please call with any questions or concerns to [phone] and carefully follow the prompts so that you are directed to the right person  All voicemails are confidential  Fax any determinations, approvals, denials, and requests for initial or continue stay review clinical to [phone]  Due to HIGH CALL volume, it would be easier if you could please send faxed requests to expedite your requests and in part, help us provide discharge notifications faster

## 2017-11-19 NOTE — CASE MANAGEMENT
Notification of Discharge  This is a Notification of Discharge from our facility [address]  Please be advised that this patient has been discharge from our facility  Below you will find the admission and discharge date and time including the patients disposition  PRESENTATION DATE: 11/7/2017  8:47 PM  IP ADMISSION DATE: 11/7/17 2202  DISCHARGE DATE: 11/14/2017 10:50 AM  DISPOSITION: Home/Self Care    [address] [facility] in the [facility] by [address] for 2017  Network Utilization Review Department  Phone: [phone]; Fax [phone]  ATTENTION: The Network Utilization Review Department is now centralized for our 7 Facilities  Make a note that we have a new phone and fax numbers for our Department  Please call with any questions or concerns to [phone] and carefully follow the prompts so that you are directed to the right person  All voicemails are confidential  Fax any determinations, approvals, denials, and requests for initial or continue stay review clinical to [phone]  Due to HIGH CALL volume, it would be easier if you could please send faxed requests to expedite your requests and in part, help us provide discharge notifications faster

## 2017-11-28 ENCOUNTER — ALLSCRIPTS OFFICE VISIT (OUTPATIENT)
Dept: OTHER | Facility: OTHER | Age: 37
End: 2017-11-28

## 2017-11-29 NOTE — PROGRESS NOTES
Assessment  1  Postoperative examination (V67 00) (Z09)    Plan  Allergic rhinitis    · Fluticasone Propionate 50 MCG/ACT Nasal Suspension   Rx By: Michael Yoder; Dispense: 0 Days ; #:1 X 16 GM Bottle; Refill: 3;For: Allergic rhinitis; FARZANEH = N; Sent To: [facility]2019 Last Updated By: Dalton Blanca; 11/28/2017 8:22:19 AM  Cold sore    · Zovirax 5 % External Cream   Rx By: Koki Conteh; Dispense: 0 Days ; #:1 X 5 GM Tube; Refill: 0;For: Cold sore; FARZANEH = N; Sent To: [facility] #9689 Last Updated By: Dalton Blanca; 11/28/2017 8:22:19 AM  Infected cyst of skin, Sebaceous cyst    · Mupirocin Calcium 2 % External Cream (Bactroban)   Rx By: Koki Conteh; Dispense: 0 Days ; #:2 X 30 GM Tube; Refill: 0;Infected cyst of skin, Sebaceous cyst; FARZANEH = N; Sent To: [facility] Last Updated By: Dalton Blanca; 11/28/2017 8:22:19 AM  Postoperative examination    · Follow-up visit in 1 month Evaluation and Treatment  Follow-up  Status: Hold For -Scheduling  Requested for: [id number]   Ordered; For: Postoperative examination; Ordered By: Trevor Schwab Performed:  Due: [id number]    Discussion/Summary    73-year-old male status post open cholecystectomy  Doing well  Staples are removed  Drain is removed  He needs to follow up with GI for stent removal  See back 1 month  Allow back to work tomorrow  The patient was counseled regarding instructions for management  The patient has the current Goals: Met  The patent has the current Barriers: None  Patient is able to Self-Care  Educational resources provided: None needed  Possible side effects of new medications were reviewed with the patient/guardian today  The treatment plan was reviewed with the patient/guardian  The patient/guardian understands and agrees with the treatment plan     Self Referrals: No Seen acutely in Ed  Chief Complaint  p/o open marli  Still has drain and staples   Appetite normal and bowel and bladder working just fine       Post-Op  HPI: 18-year-old male status post open fenestrated cholecystectomy  Doing well no major complaints or problems  Almost no drainage from the drain which is non bilious  Having bowel movements      Active Problems  1  Adjustment disorder with anxiety (309 24) (F43 22)   2  Allergic rhinitis (477 9) (J30 9)   3  Cold sore (054 9) (B00 1)   4  Infected cyst of skin (706 2) (L72 9,L08 9)   5  Lipoma (214 9) (D17 9)   6  Lump in neck (784 2) (R22 1)   7  Neck pain (723 1) (M54 2)   8  Sebaceous cyst (706 2) (L72 3)   9  Upper respiratory infection (465 9) (J06 9)    Social History     · Always uses seat belt   · Being A Social Drinker   · Former smoker (C63 94) (I79 766)   · Full-time employment   ·     Current Meds   1  Fluticasone Propionate 50 MCG/ACT Nasal Suspension; USE 1 SPRAY IN EACH NOSTRIL TWICE DAILY; Therapy: 02VFB2221 to (Evaluate:10Oct2014)  Requested for: 92XKB0116; Last Rx:14Mar2014 Ordered   2  Multi-Vitamins Oral Tablet; Therapy: 78OIV8991 to Recorded   3  Mupirocin Calcium 2 % External Cream; APPLY AND GENTLY MASSAGE INTO AFFECTED AREA(S) TWICE DAILY; Therapy: 22UOI3099 to (Last Rx:48Xzm3410)  Requested for: 67Amk3883 Ordered   4  Zovirax 5 % External Cream; APPLY TO THE AFFECTED AREA EVERY 2 HOURS AS DIRECTED; Therapy: 23RAY4371 to (Last Rx:57Tpv8858)  Requested for: 24Vmw2162 Ordered    Allergies    1  Penicillins   2  Percocet TABS    Physical Exam   Abdomen  Abdomen: Abnormal  -- Right upper quadrant incision healing well with staples in place  ALESSIA drain right side almost no fluid in the bulb  Procedure   Wound Exam: well healed with no sign of infection  Procedure Note: All staples were removed  Dressing: Steri-Strips were applied  Drain suture cut, drain removed without difficulty      Message  Return to work or school:   Luis Max is under my professional care  He was seen in my office on November 28, 2017   He is able to return to work on   November 29, 2017    He can perform work without limitations           Future Appointments    Date/Time Provider Specialty Site   12/19/2017 04:30 PM MERRY Sherman  [facility]   01/16/2018 04:30 PM MERRY Sherman  [facility]       Signatures   Electronically signed by : Johnny Alejandra MD; Nov 28 2017  8:43AM EST                       (Author)

## 2017-12-19 ENCOUNTER — ALLSCRIPTS OFFICE VISIT (OUTPATIENT)
Dept: OTHER | Facility: OTHER | Age: 37
End: 2017-12-19

## 2017-12-28 ENCOUNTER — GENERIC CONVERSION - ENCOUNTER (OUTPATIENT)
Dept: OTHER | Facility: OTHER | Age: 37
End: 2017-12-28

## 2018-01-09 NOTE — PSYCH
Treatment Plan Tracking    #1 Treatment Plan not completed within required time limits due to: Other: There was no time during the initial evaluation to complete the treatment plan             Signatures   Electronically signed by : SAVANA SilverioWestbrook Medical CenterMARGARET; Mar 28 2016 11:32AM EST                       (Author)

## 2018-01-10 NOTE — PSYCH
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  He shared he is getting along better with the family  We discussed his other goals of mindfully handling stress  His anxiety is a lot less since he finally got a job which could evolve into permanent work  We discussed meditation, and guided imagery to help him with any spikes in his anxiety  he did share that every now and then his wife brings up what he did and he is reminded of the approximately 07453 dollars this has all cost them  Assessment: We reviewed stress management strategies  We also reviewed mindfulness strategies  Plan: Will continue to help him to navigate thru this difficult time period  Pain Scale and Suicide Risk [facility]: Current Pain Assessment: no pain   On a scale of 0 to 10, the patient rates current pain at 0   Current suicide risk is low   Behavioral Health Treatment Plan [address] [place] [address] [address]: Diagnosis and Treatment Plan explained to patient, patient relates understanding diagnosis and is agreeable to Treatment Plan  Assessment    1   Adjustment disorder with anxiety (309 24) (F43 22)    Signatures   Electronically signed by : BAIRON Patel; Sep 20 2016  7:44PM EST                       (Author)    Electronically signed by : BAIRON Patel; Sep 23 2016 10:01AM EST                       (Author)

## 2018-01-10 NOTE — PSYCH
Progress Note  [facility] Individual Psychotherapy 50 minutes minutes provided today  Goals addressed in session:   Data: The patient arrived for his individual session  he was on vacation visiting his inlaws  When they got back he got a letter from the courts his CIPRIANO was denied  Both his wife and he reacted strongly  However after the  was called he got back and the CIPRIANO was approved  He was on vacation at his in laws at the [facility]  Regarding his recovery goals he feels he is developing a better relationship with his family  he feels he is handling stress better  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 and there is a federal azra to help such people who lose jobs  He feels he is using the strategies of mindfulness to better deal with the anxiety this all has caused him  Assessment: The patient is good at using the strategies of mindfulness we have discussed in session  Plan: Continue to skill build in distress tolerance  Pain Scale and Suicide Risk [facility]: Current Pain Assessment: no pain   On a scale of 0 to 10, the patient rates current pain at 0   Current suicide risk is low   Behavioral Health Treatment Plan [address] [place] [address] [address]: Diagnosis and Treatment Plan explained to patient, patient relates understanding diagnosis and is agreeable to Treatment Plan  Assessment    1   Adjustment disorder with anxiety (410 11) (F43 22)    Signatures   Electronically signed by : MERRY SilverioMARGARET; Jul 12 2016  3:37PM EST                       (Author)

## 2018-01-12 NOTE — PSYCH
Progress Note  Psychotherapy Provided [facility]: Individual Psychotherapy 50 minutes minutes provided today  Goals addressed in session:   D: The patient arrived for his individual session  He talked about his charges and looking for a job  He literally had to show to court just to have a court date set  He is getting tired and remains anxious over the court case how it will affect future unemployment  Regarding his goals he feels despite all the issues with his legal charges and despite the loss of his job he is developing a better relationship with his family and spending more time with his family  Regarding his anxiety he uses the mindfulness we have discussed and he is keeping busy which helps  He also feels keeping busy he is also dealing with stress more effectively  Assessment: Through motivational interviewing we discussed through retraining at unemployment what his future goals and wants are  He has less anxiety and is dealing with stress better  Plan: Continue to skill build in distress tolerance  Pain Scale and Suicide Risk [facility]: Current Pain Assessment: no pain   On a scale of 0 to 10, the patient rates current pain at 0   Current suicide risk is low   Behavioral Health Treatment Plan [facility]: Diagnosis and Treatment Plan explained to patient, patient relates understanding diagnosis and is agreeable to Treatment Plan  Assessment    1   Adjustment disorder with anxiety (206 24) (O64 18)    Signatures   Electronically signed by : MERRY MartinezMARGARET; Jun 27 2016  9:03AM EST                       (Author)

## 2018-01-13 NOTE — PSYCH
Treatment Plan Tracking    #1 Treatment Plan not completed within required time limits due to: Other: There was no time during the initial evaluation to complete the recovery plan  Today during his first session he was so upset about losing his job over his legal issues he needed to talk  We will complete the recovery plan next session        #2 Treatment Plan not completed within required time limits due to: Other: the update was due on the 18th of august  However today was the first time he was here since then and mom and him wanted to talk  We will update it next session    I could not erase the last sentence which was put in error  His mom was not present however he wanted to talk          Signatures   Electronically signed by : BAIRON Martinez; Apr 8 2016 10:32AM EST                       (Author)    Electronically signed by : BAIRON Martinez; Sep 20 2016  6:59PM EST                       (Author)    Electronically signed by : BAIRON Martinez; Sep 23 2016  9:56AM EST                       (Author)

## 2018-01-13 NOTE — PSYCH
Progress Note  Psychotherapy Provided [facility]: Individual Psychotherapy 50 minutes minutes provided today  Goals addressed in session:   Data: The patient is waiting to hear from unemployment  He lost his job but is not being charged for a felony but will get CIPRIANO  We developed his recovery plan  I went over deep breathing, guided imagery, and medication strategies  He wants to regain his family's trust, we will continue to work on mindfulness skills, and will continue to practice relaxation strategies  We discussed strategies to help him with his wife who is also having a difficult time with this  Assessment: The patient continues to be anxious about his financial situation  We went over all of his goals  I will have him do readings on mindfulness  Plan: Continue to skill build in distress tolerance,   Pain Scale and Suicide Risk [facility]: Current Pain Assessment: no pain   On a scale of 0 to 10, the patient rates current pain at 0   Current suicide risk is low   Behavioral Health Treatment Plan [facility]: Diagnosis and Treatment Plan explained to patient, patient relates understanding diagnosis and is agreeable to Treatment Plan  Assessment    1   Adjustment disorder with anxiety (713 83) (I00 22)    Signatures   Electronically signed by : BAIRON Grover; Apr 18 2016 10:12AM EST                       (Author)

## 2018-01-15 NOTE — MISCELLANEOUS
Message  Return to work or school:   Michelle Lewis is under my professional care  He was seen in my office on November 28, 2017   He is able to return to work on  November 29, 2017    He can perform work without limitations           Future Appointments    Signatures   Electronically signed by : Yony Aaron MD; Nov 28 2017  8:43AM EST                       (Author)

## 2018-01-15 NOTE — PSYCH
Progress Note  Psychotherapy Provided [facility]: Individual Psychotherapy 50 minutes minutes provided today  Goals addressed in session:   Data: The patient continues to look for work  He lost his prior job due to his legal issues  Regarding his recovery goals he continues to work on his relationship with his wife  There was a lot of tension between the 2 of them over his legal issue  Regarding recovery goal 1 and 2 We discussed mindfulness skills to better manage his stress and his anxiety  Assessment: The patient is feeling less anxious and stressed because he knows now he is getting CIPRIANO for his charge  Plan: Continue to skill build in distress tolerance  Pain Scale and Suicide Risk [facility]: Current Pain Assessment: no pain   On a scale of 0 to 10, the patient rates current pain at 0   Current suicide risk is low   Behavioral Health Treatment Plan [address] [place] [address] [address]: Diagnosis and Treatment Plan explained to patient, patient relates understanding diagnosis and is agreeable to Treatment Plan  Assessment    1   Adjustment disorder with anxiety (309 24) (F43 22)    Signatures   Electronically signed by : Jose Juan Howard ACSWLCSW; May  9 2016  9:24AM EST                       (Author)

## 2018-01-16 ENCOUNTER — ALLSCRIPTS OFFICE VISIT (OUTPATIENT)
Dept: OTHER | Facility: OTHER | Age: 38
End: 2018-01-16
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
## 2018-01-16 NOTE — PSYCH
1  Adjustment disorder with anxiety (309 24) (W64 13)      Date of Initial Treatment Plan: 04/18/2016  Date of Current Treatment Plan: 04/18/2016  Treatment Plan Initial      Strengths/Personal Resources for Self Care: Strengths-loves family, works hard, caring person  Diagnosis:   Axis I: Adjustment disorder with anxiety   Axis II: deferred   Axis III: Noncontributory     Current Challenges/Problems/Needs: 1-I need to better my relationship with my wife and my family  2- I need to deal more effectively with stress  3 -I need to deal with my anxiety  Long Term Goals:   I will have a better relationship with my family  Target Date: 08/18/2016      I will deal more effectively with stress  Target Date: 08/18/2016      I will deal with my anxiety  Target Date: 08/18/2016      Short Term Objectives:   Goal 1:   I will do things that will help build trust with me and my family  Target Date: 08/18/2016      Goal 2:   I will continue to learn more about mindfulness, radical acceptance and distress tolerance  Target Date: 08/18/2016      Goal 3:   I will learn meditation, guided imagery, deep breathing and other relaxation techniques  Target Date: 08/18/2016      GOAL 1: Modality: Individual every other week x per month Target Date: 08/18/16         GOAL 2: Modality: Individual every other week x per month Target Date: 08/18/2016         GOAL 3: Modality: Individual every other week x per month Target Date: 08/18/2016         The first scheduled review date is 08/18/2016  The expected length of service is 12 to 15 months  Level of functioning at initial assessment: 50  The highest level of functioning in the past year was 55-60  The current level of functioning is 50  Patient Signature: _________________________________ Date/Time: ______________        1  Adjustment disorder with anxiety (309 24) (F43 22)   2  Allergic rhinitis (477 9) (J30 9)   3   Cold sore (054 9) (B00 1)   4  Infected cyst of skin (706 2) (L72 9,L08 9)   5  Lipoma (214 9) (D17 9)   6  Lump in neck (784 2) (R22 1)   7  Neck pain (723 1) (M54 2)   8  Sebaceous cyst (706 2) (L72 3)   9   Upper respiratory infection (465 9) (J06 9)     Electronically signed by : SAVANA GroverWLCMARGARET; Apr 18 2016  9:36AM EST                       (Author)

## 2018-01-16 NOTE — PSYCH
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  We discussed his daughters situation  He wanted to talk so we will do his treatment plan next session  he wants to work on his temper with his daughter  He and his wife are trying to minimalize  I may never regain her trust again but I am working on it  He talked about his job and her job  He was charged with stealing mail but got CIPRIANO  We discussed strategies to help him better manage his daughter  Assessment: We will do his recovery plan next session  He has not been here since September 0f 2016  He needed to talk We will update it next session  Plan: Continue to skill build in distress tolerance  Pain Scale and Suicide Risk [facility]: Current Pain Assessment: no pain   On a scale of 0 to 10, the patient rates current pain at 0   Current suicide risk is low   Behavioral Health Treatment Plan [facility]: Diagnosis and Treatment Plan explained to patient, patient relates understanding diagnosis and is agreeable to Treatment Plan  Assessment    1   Adjustment disorder with anxiety (877 32) (M25 26)    Signatures   Electronically signed by : BAIRON Acosta; Jul 18 2017  5:27PM EST                       (Author)    Electronically signed by : BAIRON Acosta; Dec 19 2017  4:52PM EST                       (Author)

## 2018-01-16 NOTE — PSYCH
Progress Note  Psychotherapy Provided [facility]: Individual Psychotherapy 50 minutes minutes provided today  Goals addressed in session:   Data: The patient arrived for his individual session  He shared how he is waiting for his CIPRIANO and court hearing papers  He continues to look for jobs and he is having issues with the fact that the hours or the money are problems  Regarding his first recovery goal he is trying to improve his relationship with his family  It was how before he even lost his job he was highly stressed and he avoided things  Also the legal charges he now has didn't help  We discussed mindfulness and I used motivational interviewing strategies to help him with his needs and his goals for the future  We discussed strategies to better manage his stress with is his second goal  We also discussed mindful ways for him to handle his anxiety  He is trying to get the things done that he wants to around the house  Assessment: We continue to work on strategies to help him cope with anxiety and stress and I continue to use motivational interviewing strategies to help him see where he wants to go  Plan: Continue to skill build in distress tolerance  Pain Scale and Suicide Risk [facility]: Current Pain Assessment: no pain   On a scale of 0 to 10, the patient rates current pain at 0   Current suicide risk is low   Behavioral Health Treatment Plan [facility]: Diagnosis and Treatment Plan explained to patient, patient relates understanding diagnosis and is agreeable to Treatment Plan  Assessment    1   Adjustment disorder with anxiety (083 40) (Q83 72)    Signatures   Electronically signed by : BAIRON Martinez; May 25 2016  9:14AM EST                       (Author)

## 2018-01-17 NOTE — PSYCH
Behavioral Health Outpatient Intake    Referred By: WIFE SEEN IN PAST  Intake Questions: there are no developmental disabilities  the patient does not have a hearing impairment  the patient does not have an ICM or CTT  patient is not taking injectable psychiatric medications  Employment: The patient is employed at [employer]  Emergency Contact Information:   Emergency Contact: Smiley Noland   Relationship to Patient: WIFE   Phone Number: [phone]   Previous Psychiatric Treatment: He has not been previously seen by a psychiatrist     He has not been previously seen by a therapist     History: no  service  He has not had combat service  He 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 as a member of the [employer] or [employer]  Insurance Subscriber: Smiley Ludin   : [birth year]   Employer: [employer]   Primary Insurance: [employer]   Phone number: [phone]   ID number: [id number]   Group number: [id number]         Presenting Problem (in patient's words): HAVING ISSUES WANTING TO STEAL THINGS, ANXIETY  Substance Abuse: NONE  Previous Treatment: The patient has not been seen here in the past      Accepted as Patient   ZARI [address]  3/28/16          Signatures   Electronically signed by : Lissette Blackburn, ; Mar 10 2016 11:20AM EST                       (Author)

## 2018-01-17 NOTE — PSYCH
Progress Note  Psychotherapy Provided [facility]: Individual Psychotherapy 50 minutes minutes provided today  Goals addressed in session:   Data: I lost my job of 9 years  They let me go due to the charges being put in the paper concerning me stealing packages from people's mailboxes  He shared he meets again with his  on Monday  The  told him he will probably get CIPRIANO  However he was so upset that he needed to talk and we agreed to do the recovery plan the next time he comes in  I did a lot of listening and supportive counseling  the patient is scared he may not get unemployment  Assessment: The patient stated he needed to vent and he needed support and assurance that he will get thru this  Plan: Continue to skill build in distress tolerance  Pain Scale and Suicide Risk [facility]: Current Pain Assessment: no pain   On a scale of 0 to 10, the patient rates current pain at 0   Current suicide risk is low   Behavioral Health Treatment Plan [facility]: Diagnosis and Treatment Plan explained to patient, patient relates understanding diagnosis and is agreeable to Treatment Plan  Assessment    1   Adjustment disorder with anxiety (309 92) (F43 22)    Signatures   Electronically signed by : Yanet Dominguez, ACSWLCSW; Apr 8 2016 10:37AM EST                       (Author)

## 2018-01-18 NOTE — PSYCH
Treatment Plan Tracking    #1 Treatment Plan not completed within required time limits due to: Other: The client has not been here since September  He wanted to talk  We will update the recovery plan next session    He has not been since September 2016  #2 Treatment Plan not completed within required time limits due to: Other: He was last here since July 2017  He has had multiple health issues  We will do a new recovery plan tonight           Signatures   Electronically signed by : BAIRON Mason; Jul 18 2017  5:24PM EST                       (Author)    Electronically signed by : BAIRON Mason; Dec 19 2017  4:54PM EST                       (Author)    Electronically signed by : BAIRON Mason; Dec 19 2017  4:56PM EST                       (Author)

## 2018-01-18 NOTE — PSYCH
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  We discussed the progress he is making on his recovery goals  He discussed and gave examples of how he is better at spending more quality time with his family and how he is doing more activities with his kids  We once again discussed mindful strategies to more effectively deal with stress and his anxiety such as distress tolerance, radical acceptance, deep breathing and meditation  Assessment: Radha Hurt is better at using these strategies and he is forging ahead in his current job search  Plan: Continue to skill build in distress tolerance  Pain Scale and Suicide Risk [facility]: Current Pain Assessment: no pain   On a scale of 0 to 10, the patient rates current pain at 0   Current suicide risk is low   Behavioral Health Treatment Plan [employer]: Diagnosis and Treatment Plan explained to patient, patient relates understanding diagnosis and is agreeable to Treatment Plan  Assessment    1   Adjustment disorder with anxiety (309 71) (F45 22)    Signatures   Electronically signed by : Yuliya Guallpa ACSWLCSW; Jul 28 2016 10:25AM EST                       (Author)

## 2018-01-23 NOTE — PSYCH
Progress Note  Psychotherapy Provided [facility]: Individual Psychotherapy 50 minutes minutes provided today  Goals addressed in session:   Data: The client arrived for his session  He had major surgery in the beginning of November  He had an open gallbladder surgery  He has been dealing with a daughter who has serious ADHD  He still wants to have a better relationship with his family and he feels he still needs to deal with stress and anxiety  He talked about his job and the stressors he deals with  Assessment: We discussed about his anxiety about what he medically went thru  We worked on mindfulness and relaxation strategies  Plan: Continue to skill build in distress tolerance  Pain Scale and Suicide Risk [facility]: Current Pain Assessment: moderate to severe   On a scale of 0 to 10, the patient rates current pain at 2   Current suicide risk is low   Behavioral Health Treatment Plan [facility]: Diagnosis and Treatment Plan explained to patient, patient relates understanding diagnosis and is agreeable to Treatment Plan  Assessment    1   Adjustment disorder with anxiety (898 45) (R27 96)    Signatures   Electronically signed by : Laci Carpio ACSWLCSW; Dec 19 2017  5:16PM EST                       (Author)

## 2018-01-23 NOTE — PSYCH
1  Adjustment disorder with anxiety (309 24) (G81 88)      Date of Initial Treatment Plan: 04/18/2016  Date of Current Treatment Plan: 12/19/2017  Treatment Plan 1st update  Strengths/Personal Resources for Self Care: loves his family  works hard, caring person  Diagnosis:   Axis I: Adj disoder with anxiety,    Axis II: deferred   Axis III: noncontributory     Area of Needs: 1- I still need to work on my relationship with my family  2- I still need to reduce my stress and anxiety  Long Term Goals:   I want to have a better relationship with my family   Target Date: 04/19/18      I will have less stress and anxiety   Target Date: 04/19/18         Short Term Objectives:   Goal 1:   I will talk about my issues in therapy concerning my family  Target Date: 04/19/18      Goal 2:   I will use mindfulness and guided imagery along with deep breathing  Target Date: 04/19/18          GOAL 1: Modality: Individual once monthly x per month Target Date: 04/19/18         GOAL 2: Modality: Individual once monthly x per month Target Date: 04/19/18                    The first scheduled review date is 04/19/18  The expected length of service is 12 to 15 months  Level of functioning at initial assessment: 55-60  The highest level of functioning in the past year was 55-60  The current level of functioning is 55-60  Patient Signature: _________________________________ Date/Time: ______________        1  Adjustment disorder with anxiety (309 24) (F43 22)   2  Allergic rhinitis (477 9) (J30 9)   3  Cold sore (054 9) (B00 1)   4  Infected cyst of skin (706 2) (L72 9,L08 9)   5  Lipoma (214 9) (D17 9)   6  Lump in neck (784 2) (R22 1)   7  Neck pain (723 1) (M54 2)   8  Postoperative examination (V67 00) (Z09)   9  Sebaceous cyst (706 2) (L72 3)   10   Upper respiratory infection (465 9) (J06 9)     Electronically signed by : BAIRON Gabriel; Dec 19 2017  5:07PM EST (Author)

## 2018-01-23 NOTE — PSYCH
Progress Note  Psychotherapy Provided [facility]: Individual Psychotherapy 50 minutes minutes provided today  Goals addressed in session:   Data: The client arrived for his session  his job is going well and he feels things are better at home  he is still dealing with his daughter who has ADHD  She sees Dr Raheel Skinner  He and his wife are cleaning out, he feels he has a better relationship with his family and he is maintaining his stress and anxiety  These are all his goals  Assessment: He is making good progress on his goals  Plan: Continue to skill build in distress tolerance  Pain Scale and Suicide Risk [facility]: Current Pain Assessment: moderate to severe   On a scale of 0 to 10, the patient rates current pain at 3   Current suicide risk is low   Behavioral Health Treatment Plan [facility]: Diagnosis and Treatment Plan explained to patient, patient relates understanding diagnosis and is agreeable to Treatment Plan  Assessment    1   Adjustment disorder with anxiety (932 15) (R56 22)    Signatures   Electronically signed by : Teto Cheung ACSWLCSW; Jan 16 2018  5:17PM EST                       (Author)

## 2018-01-24 VITALS — HEIGHT: 78 IN | WEIGHT: 249.06 LBS | BODY MASS INDEX: 28.82 KG/M2

## 2018-01-30 ENCOUNTER — TELEPHONE (OUTPATIENT)
Dept: GASTROENTEROLOGY | Facility: CLINIC | Age: 38
End: 2018-01-30

## 2018-02-13 NOTE — TELEPHONE ENCOUNTER
So I owuld think he should follow up w/ Yul[facility] Saravia for removal since he put it in unless he is out of town    Shannen Cam

## 2018-02-15 NOTE — TELEPHONE ENCOUNTER
Spoke to pt he is aware that he must contact [facility] Lori Damon for stent removal I provided pt with phone number per pt he will be contacting there office   Ph [phone]

## 2018-04-13 ENCOUNTER — ANESTHESIA EVENT (OUTPATIENT)
Dept: GASTROENTEROLOGY | Facility: HOSPITAL | Age: 38
End: 2018-04-13
Payer: COMMERCIAL

## 2018-04-13 ENCOUNTER — HOSPITAL ENCOUNTER (OUTPATIENT)
Facility: HOSPITAL | Age: 38
Setting detail: OUTPATIENT SURGERY
Discharge: HOME | End: 2018-04-13
Attending: INTERNAL MEDICINE | Admitting: INTERNAL MEDICINE
Payer: COMMERCIAL

## 2018-04-13 ENCOUNTER — ANESTHESIA (OUTPATIENT)
Dept: GASTROENTEROLOGY | Facility: HOSPITAL | Age: 38
End: 2018-04-13
Payer: COMMERCIAL

## 2018-04-13 VITALS
DIASTOLIC BLOOD PRESSURE: 81 MMHG | TEMPERATURE: 98.4 F | HEIGHT: 78 IN | RESPIRATION RATE: 18 BRPM | WEIGHT: 250 LBS | BODY MASS INDEX: 28.93 KG/M2 | OXYGEN SATURATION: 97 % | SYSTOLIC BLOOD PRESSURE: 140 MMHG | HEART RATE: 64 BPM

## 2018-04-13 RX ORDER — PROPOFOL 10 MG/ML
INJECTION, EMULSION INTRAVENOUS AS NEEDED
Status: DISCONTINUED | OUTPATIENT
Start: 2018-04-13 | End: 2018-04-13 | Stop reason: SURG

## 2018-04-13 RX ORDER — GLYCOPYRROLATE 0.2 MG/ML
INJECTION INTRAMUSCULAR; INTRAVENOUS AS NEEDED
Status: DISCONTINUED | OUTPATIENT
Start: 2018-04-13 | End: 2018-04-13 | Stop reason: SURG

## 2018-04-13 RX ORDER — SODIUM CHLORIDE 9 MG/ML
50 INJECTION, SOLUTION INTRAVENOUS CONTINUOUS
Status: DISCONTINUED | OUTPATIENT
Start: 2018-04-13 | End: 2018-04-13 | Stop reason: HOSPADM

## 2018-04-13 RX ORDER — LIDOCAINE HYDROCHLORIDE 10 MG/ML
INJECTION, SOLUTION INFILTRATION; PERINEURAL AS NEEDED
Status: DISCONTINUED | OUTPATIENT
Start: 2018-04-13 | End: 2018-04-13 | Stop reason: SURG

## 2018-04-13 RX ADMIN — PROPOFOL 100 MG: 10 INJECTION, EMULSION INTRAVENOUS at 13:27

## 2018-04-13 RX ADMIN — GLYCOPYRROLATE 0.2 MG: 0.2 INJECTION, SOLUTION INTRAMUSCULAR; INTRAVENOUS at 13:23

## 2018-04-13 RX ADMIN — SODIUM CHLORIDE: 0.9 INJECTION, SOLUTION INTRAVENOUS at 13:10

## 2018-04-13 RX ADMIN — PROPOFOL 50 MG: 10 INJECTION, EMULSION INTRAVENOUS at 13:29

## 2018-04-13 RX ADMIN — PROPOFOL 50 MG: 10 INJECTION, EMULSION INTRAVENOUS at 13:34

## 2018-04-13 RX ADMIN — PROPOFOL 50 MG: 10 INJECTION, EMULSION INTRAVENOUS at 13:32

## 2018-04-13 RX ADMIN — LIDOCAINE HYDROCHLORIDE 50 MG: 10 INJECTION, SOLUTION INFILTRATION; PERINEURAL at 13:27

## 2018-04-13 RX ADMIN — SODIUM CHLORIDE 50 ML/HR: 0.9 INJECTION, SOLUTION INTRAVENOUS at 13:07

## 2018-04-13 NOTE — ANESTHESIA POSTPROCEDURE EVALUATION
Post-Op Assessment Note      CV Status:  Stable    Mental Status:  Alert and awake    Hydration Status:  Euvolemic    PONV Controlled:  Controlled    Airway Patency:  Patent    Post Op Vitals Reviewed: Yes          Staff: CRNA           BP   150/90   Temp      Pulse  82   Resp   16   SpO2   98%

## 2018-04-13 NOTE — OP NOTE
**** GI/ENDOSCOPY REPORT ****     PATIENT NAME: MARY COOK - VISIT ID:     INTRODUCTION: Esophagogastroduodenoscopy - A 42 male patient presents for   an outpatient Esophagogastroduodenoscopy at [place] [address]  INDICATIONS: Removal of a biliary stent  CONSENT: The benefits, risks, and alternatives to the procedure were   discussed and informed consent was obtained from the patient  PREPARATION:  EKG, pulse, pulse oximetry and blood pressure were monitored   throughout the procedure  MEDICATIONS: Anesthesia-check records     PROCEDURE:  The endoscope was passed with ease under direct visualization   and advanced to the 3rd portion of the duodenum  The scope was withdrawn   and the mucosa was carefully examined  The views were excellent  The   patient's toleration of the procedure was excellent  FINDINGS:   Esophagus: The esophagus appeared to be normal   Stomach: The   stomach appeared to be normal   Duodenum: A stent was observed in the   major papilla  Stent retrieval using a snare was performed with success  The stent was retrieved  Otherwise, the duodenum appeared to be normal      COMPLICATIONS: There were no complications  IMPRESSIONS: Normal esophagus  Normal stomach  A stent found in the major   papilla  Stent retrieval was performed  ESTIMATED BLOOD LOSS:     RECOMMENDATIONS: Call Dr Leonor Lund [phone] with questions or   problems  Follow-up appointment in attending physician's office Dr Leonor Lund [phone] on an as needed basis  PROCEDURE CODES: 58433 - EGD flexible; with foreign body removal     ICD-9 Codes:     ICD-10 Codes:     PERFORMED BY: BELKIS Robert  on 04/13/2018  Version 1, electronically signed by BELKIS Robert  on   04/13/2018 at 13:41

## 2018-04-13 NOTE — ANESTHESIA PREPROCEDURE EVALUATION
Review of Systems/Medical History  Patient summary reviewed  Chart reviewed  No history of anesthetic complications     Cardiovascular  Exercise tolerance: good,  No hypertension , No CAD, ,    Pulmonary  Not a smoker , No asthma: , No sleep apnea ,        GI/Hepatic  Negative GI/hepatic ROS          Negative  ROS        Endo/Other  Negative endo/other ROS      GYN  Negative gynecology ROS          Hematology  Negative hematology ROS      Musculoskeletal  Negative musculoskeletal ROS        Neurology  Negative neurology ROS      Psychology           Physical Exam    Airway    Mallampati score: I  TM Distance: >3 FB       Dental       Cardiovascular      Pulmonary      Other Findings  Chipped implant    Lab Results   Component Value Date    WBC 8 20 11/11/2017    HGB 12 6 11/11/2017     11/11/2017     Lab Results   Component Value Date     11/12/2017    K 3 5 11/12/2017    BUN 11 11/12/2017    CREATININE 0 70 11/12/2017    GLUCOSE 110 11/12/2017     Lab Results   Component Value Date    PTT 31 11/07/2017      Lab Results   Component Value Date    INR 1 25 (H) 11/07/2017       Blood type O    No results found for: HGBA1C      Anesthesia Plan  ASA Score- 2     Anesthesia Type- IV sedation with anesthesia with ASA Monitors  Additional Monitors:   Airway Plan:     Comment: BELKIS Moody , have personally seen and evaluated the patient prior to anesthetic care  I have reviewed the pre-anesthetic record, and other medical records if appropriate to the anesthetic care  If a CRNA is involved in the case, I have reviewed the CRNA assessment, if present, and agree  Risks/benefits and alternatives discussed with patient including possible PONV, sore throat, and possibility of rare anesthetic and surgical emergencies        Plan Factors- Patient instructed to abstain from smoking on day of procedure  Patient did not smoke on day of surgery      Induction-     Postoperative Plan- Informed Consent- Anesthetic plan and risks discussed with patient  I personally reviewed this patient with the CRNA  Discussed and agreed on the Anesthesia Plan with the CRNA  Joey Pérez

## 2018-04-13 NOTE — H&P
History and Physical - [facility]  Samson Crews 42 y o  male MRN: [id number]                  HPI: Samson Crews is a 42y o  year old male who presents for the removal of biliary stent placed in November 2017  The patient had acute cholecystitis, underwent cholecystectomy  Following surgery he had a bile leak from bile duct injury  Currently no GI symptoms  REVIEW OF SYSTEMS: Per the HPI, and otherwise unremarkable  Historical Information   Past Medical History:   Diagnosis Date    Bile duct abnormality     leaking     Past Surgical History:   Procedure Laterality Date    CHOLECYSTECTOMY      CHOLECYSTECTOMY LAPAROSCOPIC N/A 11/8/2017    Procedure: CHOLECYSTECTOMY LAPAROSCOPIC, CONVERTED TO OPEN CHOLECYSTECTOMY, REPAIR UMBLICAL HERNIA  AND LYSIS OF ADHESIONS;  Surgeon: Cheo Jhaveri MD;  [facility];  Service: General    ERCP N/A 11/10/2017    Procedure: ENDOSCOPIC RETROGRADE CHOLANGIOPANCREATOGRAPHY (ERCP); Surgeon: Jaxon Robles MD;  [facility]; Service: Gastroenterology    ESOPHAGOGASTRODUODENOSCOPY      HERNIA REPAIR      KNEE ARTHROSCOPY      LIPOMA RESECTION      neck    TONSILLECTOMY      VASECTOMY       Social History   History   Alcohol Use    Yes     Comment: occasional     History   Drug Use No     History   Smoking Status    Never Smoker   Smokeless Tobacco    Never Used     History reviewed  No pertinent family history  Meds/Allergies     No prescriptions prior to admission  Allergies   Allergen Reactions    Penicillins Hives    Percocet [Oxycodone-Acetaminophen] GI Intolerance       Objective     Blood pressure 149/90, pulse 83, temperature 98 4 °F (36 9 °C), temperature source Temporal, resp  rate 18, height 6' 7" (2 007 m), weight 113 kg (250 lb), SpO2 97 %        PHYSICAL EXAM    Gen: NAD  CV: RRR  CHEST: Clear  ABD: soft, NT/ND  EXT: no edema      ASSESSMENT/PLAN:  This is a 42y o  year old male here for the removal of a biliary stent placed in November 2017    PLAN:  EGD with biliary stent removal  Procedure:

## 2018-05-16 ENCOUNTER — APPOINTMENT (OUTPATIENT)
Dept: RADIOLOGY | Age: 38
End: 2018-05-16
Payer: COMMERCIAL

## 2018-05-16 ENCOUNTER — OFFICE VISIT (OUTPATIENT)
Dept: FAMILY MEDICINE CLINIC | Facility: CLINIC | Age: 38
End: 2018-05-16
Payer: COMMERCIAL

## 2018-05-16 ENCOUNTER — APPOINTMENT (OUTPATIENT)
Dept: LAB | Age: 38
End: 2018-05-16
Payer: COMMERCIAL

## 2018-05-16 VITALS
SYSTOLIC BLOOD PRESSURE: 122 MMHG | BODY MASS INDEX: 30.22 KG/M2 | WEIGHT: 261.2 LBS | HEIGHT: 78 IN | TEMPERATURE: 98.7 F | RESPIRATION RATE: 16 BRPM | HEART RATE: 78 BPM | DIASTOLIC BLOOD PRESSURE: 78 MMHG

## 2018-05-16 DIAGNOSIS — Z00.00 HEALTHCARE MAINTENANCE: Primary | ICD-10-CM

## 2018-05-16 DIAGNOSIS — Z78.9 VEGAN DIET: ICD-10-CM

## 2018-05-16 DIAGNOSIS — S99.921A FOOT TRAUMA, RIGHT, INITIAL ENCOUNTER: ICD-10-CM

## 2018-05-16 PROCEDURE — 99395 PREV VISIT EST AGE 18-39: CPT | Performed by: FAMILY MEDICINE

## 2018-05-16 PROCEDURE — 86580 TB INTRADERMAL TEST: CPT | Performed by: FAMILY MEDICINE

## 2018-05-16 PROCEDURE — 73630 X-RAY EXAM OF FOOT: CPT

## 2018-05-16 NOTE — PROGRESS NOTES
Marco A Stockton 1980 male MRN: [id number]    Health Maintenance Visit    SUBJECTIVE    HPI:  Marco A Stockton is a 42 y o  male who presented for a routine health maintenance visit  In addition to  visit, patient has an acute complaint:    Landed barefoot onto slate surface from 3 steps up ladder  2 weeks ago  Landed onto heel, no twisting at time of incident  Hurts to bear weight directly onto heel  Has noticed some bruising around the ankle  Does hurt even when non-weight bearing  Some swelling  No numbness, tingling  Full ROM although is painful  Eats vegan, is active  Other than choly no recent medical issues  CRC screening: No personal or family history of colon cancer or colon polyps  CVS screening: Patient denies any exertional chest pain, dyspnea, palpitations, syncope, orthopnea, edema or paroxysmal nocturnal dyspnea  DM screening: No polyuria, polydipsia, blurry vision, chest pain, dyspnea or claudication  No foot burning, numbness or pain  No personal or family history of skin cancers or melanoma  Review of Systems   Constitutional: Negative for activity change, chills and fever  HENT: Negative for congestion, rhinorrhea and sore throat  Eyes: Negative for visual disturbance  Respiratory: Negative for cough, shortness of breath and wheezing  Cardiovascular: Negative for chest pain and palpitations  Gastrointestinal: Negative for abdominal pain, blood in stool, constipation, diarrhea, nausea and vomiting  Genitourinary: Negative for dysuria  Musculoskeletal: Positive for gait problem and joint swelling  Negative for arthralgias and myalgias  Skin: Negative for rash  Neurological: Negative for dizziness, weakness and headaches  All other systems reviewed and are negative        Historical Information   Past Medical History:   Diagnosis Date    Bile duct abnormality     leaking     Past Surgical History:   Procedure Laterality Date    CHOLECYSTECTOMY      CHOLECYSTECTOMY LAPAROSCOPIC N/A 11/8/2017    Procedure: CHOLECYSTECTOMY LAPAROSCOPIC, CONVERTED TO OPEN CHOLECYSTECTOMY, REPAIR UMBLICAL HERNIA  AND LYSIS OF ADHESIONS;  Surgeon: Raisa Laughlin MD;  [facility];  Service: General    ERCP N/A 11/10/2017    Procedure: ENDOSCOPIC RETROGRADE CHOLANGIOPANCREATOGRAPHY (ERCP); Surgeon: Kimberly Salas MD;  [facility]; Service: Gastroenterology    ESOPHAGOGASTRODUODENOSCOPY      HERNIA REPAIR      KNEE ARTHROSCOPY      LIPOMA RESECTION      neck    NE ESOPHAGOGASTRODUODENOSCOPY TRANSORAL DIAGNOSTIC N/A 4/13/2018    Procedure: ESOPHAGOGASTRODUODENOSCOPY (EGD); Surgeon: Kimberly Salas MD;  [facility]; Service: Gastroenterology    TONSILLECTOMY      VASECTOMY       No family history on file  Social History   History   Alcohol Use    Yes     Comment: few a month     History   Drug Use No     History   Smoking Status    Never Smoker   Smokeless Tobacco    Never Used       Medications:      Current Outpatient Prescriptions:     Multiple Vitamins-Minerals (MULTIVITAMIN ADULT PO), Take by mouth, Disp: , Rfl:     Allergies   Allergen Reactions    Penicillins Hives    Percocet [Oxycodone-Acetaminophen] GI Intolerance       OBJECTIVE  Vitals:   Vitals:    05/16/18 1631   BP: 122/78   BP Location: Right arm   Patient Position: Sitting   Cuff Size: Standard   Pulse: 78   Resp: 16   Temp: 98 7 °F (37 1 °C)   TempSrc: Probe   Weight: 118 kg (261 lb 3 2 oz)   Height: 6' 6 6" (1 996 m)     Wt Readings from Last 3 Encounters:   05/16/18 118 kg (261 lb 3 2 oz)   04/13/18 113 kg (250 lb)   12/28/17 113 kg (249 lb 0 9 oz)     Body mass index is 29 73 kg/m²      Temp Readings from Last 3 Encounters:   05/16/18 98 7 °F (37 1 °C) (Probe)   04/13/18 98 4 °F (36 9 °C) (Temporal)   11/14/17 98 3 °F (36 8 °C) (Oral)     BP Readings from Last 3 Encounters:   05/16/18 122/78   04/13/18 140/81   11/14/17 127/77     Pulse Readings from Last 3 Encounters:   05/16/18 78   04/13/18 64   11/14/17 77       No LMP for male patient  Physical Exam   Constitutional: He is oriented to person, place, and time  He appears well-developed and well-nourished  No distress  HENT:   Head: Normocephalic and atraumatic  Mouth/Throat: No oropharyngeal exudate  Eyes: Conjunctivae and EOM are normal  Pupils are equal, round, and reactive to light  Neck: Normal range of motion  Neck supple  No thyromegaly present  Cardiovascular: Normal rate, regular rhythm, normal heart sounds and intact distal pulses  No murmur heard  Pulmonary/Chest: Effort normal and breath sounds normal  No respiratory distress  He has no wheezes  Abdominal: Soft  Bowel sounds are normal  He exhibits no distension  There is no tenderness  Musculoskeletal: He exhibits no edema  Neurological: He is alert and oriented to person, place, and time  Skin: Skin is warm and dry  No rash noted  He is not diaphoretic  Nursing note and vitals reviewed  Right Ankle Exam   Swelling: mild    Range of Motion   The patient has normal right ankle ROM  Muscle Strength   The patient has normal right ankle strength  Tests   Varus tilt: negative    Other   Erythema: absent  Scars: absent  Sensation: normal  Pulse: present     Comments:  +Calcaneal squeeze test    Positive TTP: CFL, deltoid ligament;  Negative TTP: ATFL, PTFL, medial/lateral malleolus    Also +swelling to medial ankle with ecchymosis      Left Ankle Exam   Left ankle exam is normal           Lab:  I have personally reviewed all pertinent results  Imaging:  I have personally reviewed all pertinent results  Assessment/Plan     Vegan diet  Screen for anemia, B12 deficiency    Foot trauma, right, initial encounter  Discussed RICE  Obtain RIGHT foot XR  Referral to Sports medicine in the case that further management is needed    Raymundo was seen today for r foot pain and injections      Diagnoses and all orders for this visit:    Vegan diet  -     Lipid Panel with Direct LDL reflex; Future  -     CBC and Platelet; Future  -     Vitamin B12; Future  -     Basic metabolic panel; Future    Healthcare maintenance  -     TB Skin Test    Foot trauma, right, initial encounter  -     XR foot 3+ vw right; Future  -     Ambulatory referral to Sports Medicine; Future    Other orders  -     Cancel: Mammo screening bilateral w cad; Future  -     Cancel: Basic metabolic panel; Future        New Medications Ordered This Visit   Medications    Multiple Vitamins-Minerals (MULTIVITAMIN ADULT PO)     Sig: Take by mouth     In addition to the above, the patient was counseled on general preventative health care subjects, including but not limited to:  - Nutrition, healthy weight, aerobic and weight-bearing exercise  - Mental health, social support, and self care  - Advised of the importance of dental hygiene and routine dental visits   - Patient made aware of  services at the office  Most Recent Immunizations   Administered Date(s) Administered    Tuberculin Skin Test-PPD Intradermal 05/16/2018       Return to [facility] in 1 [facility] for annual  visit  PCP: Coty Kearney, DO    Future Appointments  Date Time Provider Gwyn Mercado   5/18/2018 4:30 PM Akua Page [facility] PRACTICE NURSE [facility]       _____________________________________________________________________   The attending physician, Dr Johanna Oh, agreed with the plan  Bowen Connors MD, PGY-2  [facility]   5/16/2018     Please be aware that this note contains text that was dictated and there may be errors pertaining to "sound-alike "words during the dictation process

## 2018-05-16 NOTE — ASSESSMENT & PLAN NOTE
Discussed RICE  Obtain RIGHT foot XR  Referral to Sports medicine in the case that further management is needed

## 2018-05-16 NOTE — PATIENT INSTRUCTIONS
Ankle Sprain   AMBULATORY CARE:   An ankle sprain  happens when 1 or more ligaments in your ankle joint stretch or tear  Ligaments are tough tissues that connect bones  Ligaments support your joints and keep your bones in place  Common symptoms include the following:   · Trouble moving your ankle or foot    · Pain when you touch or put weight on your ankle    · Bruised, swollen, or misshapen ankle  Seek care immediately if:   · You have severe pain in your ankle  · Your foot or toes are cold or numb  · Your ankle becomes more weak or unstable (wobbly)  · You are unable to put any weight on your ankle or foot  · Your swelling has increased or returned  Contact your healthcare provider if:   · Your pain does not go away, even after treatment  · You have questions or concerns about your condition or care  Treatment:   · Medicines      ¨ NSAIDs , such as ibuprofen, help decrease swelling, pain, and fever  This medicine is available with or without a doctor's order  NSAIDs can cause stomach bleeding or kidney problems in certain people  If you take blood thinner medicine, always ask your healthcare provider if NSAIDs are safe for you  Always read the medicine label and follow directions  ¨ Acetaminophen  decreases pain  It is available without a doctor's order  Ask how much to take and how often to take it  Follow directions  Acetaminophen can cause liver damage if not taken correctly  ¨ Prescription pain medicine  may be given  Ask how to take this medicine safely  · Surgery  may be needed to repair or replace a torn ligament if your sprain does not heal with other treatments  Your healthcare provider may use screws to attach the bones in your ankle together  The screws may help support your ankle and make it stable  Ask your healthcare provider for more information about surgery to treat your ankle sprain    Self care:   · Use support devices,  such as a brace, cast, or splint, may be needed to limit your movement and protect your joint  You may need to use crutches to decrease your pain as you move around  · Go to physical therapy as directed  A physical therapist teaches you exercises to help improve movement and strength, and to decrease pain  · Rest  your ankle so that it can heal  Return to normal activities as directed  · Apply ice on your ankle for 15 to 20 minutes every hour or as directed  Use an ice pack, or put crushed ice in a plastic bag  Cover it with a towel  Ice helps prevent tissue damage and decreases swelling and pain  · Compress  your ankle  Ask if you should wrap an elastic bandage around your injured ligament  An elastic bandage provides support and helps decrease swelling and movement so your joint can heal  Wear as long as directed  · Elevate  your ankle above the level of your heart as often as you can  This will help decrease swelling and pain  Prop your ankle on pillows or blankets to keep it elevated comfortably  Prevent another ankle sprain:   · Let your ankle heal   Find out how long your ligament needs to heal  Do not do any physical activity until your healthcare provider says it is okay  If you start activity too soon, you may develop a more serious injury  · Always warm up and stretch  before you exercise or play sports  · Use the right equipment  Always wear shoes that fit well and are made for the activity that you are doing  You may also need ankle supports, elbow and knee pads, or braces  Follow up with your healthcare provider as directed:  Write down your questions so you remember to ask them during your visits  © 2017 [address] Information is for End User's use only and may not be sold, redistributed or otherwise used for commercial purposes  All illustrations and images included in CareNotes® are the copyrighted property of [employer]  or Marcelino Woods    The above information is an  only  It is not intended as medical advice for individual conditions or treatments  Talk to your doctor, nurse or pharmacist before following any medical regimen to see if it is safe and effective for you  Thank you for enrolling in [facility]  Please follow the instructions below to securely access your online medical record  MyChart allows you to send messages to your doctor, view your test results, renew your prescriptions, schedule appointments, and more  [address] uses Single Sign on (SSO) Technology for you to log in and access our [facility]  [place], including [employer]  No more remembering multiple user names and passwords! We are going to guide you through, step by step, to help you set up your [employer] account which will provide access to your [employer] account  How Do I Sign Up? 1  In your Internet browser, go to [email or web address]       2  Click on the [facility] patient account and then click Dont have an                 Account? Create one now      3  Enter your demographic information and chose a user name (email address) and password  Think of one that is secure and easy to remember  Enter a Referral code if you have one (this is not your MyChart code ) Accept the Terms and Conditions and the Privacy Policy  4  Select your security questions that you will use to reset your password should you forget it  Click Submit  5  Enter your [employer] Activation Code exactly as it appears below  You will not need to use this code after you have completed the sign-up process  If you do not sign up before the expiration date, you must request a new code  [employer] Activation Code: [email or web address]  Expires: 5/19/2018  4:05 PM    6  Confirm your email address  An email confirmation was sent to you  Please open that email and click Confirm your Email   You should then be redirected to our [employer] Single sign on page, where you will log on with the user name and password you created! Proceed to the [email or web address] Icon to view your personal health information  Additional Information  If you have questions, you can e-mail [email or web address] or call [phone] to talk to our customer support staff  Remember, [email or web address] is NOT to be used for urgent needs  For medical emergencies, dial 911  RICE Therapy   WHAT YOU NEED TO KNOW:   What is RICE therapy? RICE therapy is a 4-step process used to reduce swelling and pain from an injury  RICE stands for rest, ice, compression, and elevation  RICE should be done within the first 24 to 48 hours after an injury  How do I use RICE therapy? · Rest  the injured area so that it can heal  You may need to avoid putting any weight on your injury for at least 48 hours  Return to normal activities as directed  · Ice  the injury for 20 minutes every 4 hours, or as directed  Use an ice pack, or put crushed ice in a plastic bag  Cover it with a towel to protect your skin  Ice helps prevent tissue damage and decreases swelling and pain  · Compress  the injury with an elastic bandage, air cast, special boot, or splint to reduce swelling  Ask your healthcare provider which compression device to use, and how tight it should be  · Elevate  the injured area above the level of your heart as often as you can  This will help decrease swelling and pain  If possible, prop the injured area on pillows or blankets to keep it elevated comfortably  When should I contact my healthcare provider? · Your pain does not decrease, even after treatment  · You have questions or concerns about your condition or care  When should I seek immediate care? · You have severe pain in the injured area  · You have numbness in the injured area  · You cannot put any weight on or move the injured area  CARE AGREEMENT:   You have the right to help plan your care   Learn about your health condition and how it may be treated  Discuss treatment options with your caregivers to decide what care you want to receive  You always have the right to refuse treatment  The above information is an  only  It is not intended as medical advice for individual conditions or treatments  Talk to your doctor, nurse or pharmacist before following any medical regimen to see if it is safe and effective for you  © 2017 [address] Information is for End User's use only and may not be sold, redistributed or otherwise used for commercial purposes  All illustrations and images included in CareNotes® are the copyrighted property of A D A M , Inc  or Marcelino Woods

## 2018-05-18 ENCOUNTER — CLINICAL SUPPORT (OUTPATIENT)
Dept: FAMILY MEDICINE CLINIC | Facility: CLINIC | Age: 38
End: 2018-05-18

## 2018-05-18 ENCOUNTER — TELEPHONE (OUTPATIENT)
Dept: FAMILY MEDICINE CLINIC | Facility: CLINIC | Age: 38
End: 2018-05-18

## 2018-05-18 DIAGNOSIS — Z11.1 PPD SCREENING TEST: Primary | ICD-10-CM

## 2018-05-18 LAB
INDURATION: 0 MM
TB SKIN TEST: NEGATIVE

## 2019-01-31 ENCOUNTER — OFFICE VISIT (OUTPATIENT)
Dept: BEHAVIORAL/MENTAL HEALTH CLINIC | Facility: CLINIC | Age: 39
End: 2019-01-31
Payer: COMMERCIAL

## 2019-01-31 DIAGNOSIS — F43.22 ADJUSTMENT DISORDER WITH ANXIETY: Primary | ICD-10-CM

## 2019-01-31 PROCEDURE — 90834 PSYTX W PT 45 MINUTES: CPT | Performed by: SOCIAL WORKER

## 2019-01-31 NOTE — PSYCH
Psychotherapy Provided: Individual Psychotherapy 45 minutes     Length of time in session: 45 minutes, follow up in 2 week    Goals addressed in session: Goal 1     Pain:      moderate to severe    0    Current suicide risk : Low     Data: The client arrived for his session  He was late for the session  He technically has not been here since last January  Because he was late we will do a new recovery plan next session  He struggles with his 8year old daughter who has ADHD and OCD  He discussed all the recent financial stressors he is encountering  He discussed his mother in laws evolving health issues  It is tough on Raymundo's wife  He wants to learn to focus on coping with all of his stressors  Assessment: he wants to think about his goals for next session  Plan:Continue to skill build in distress tolerance,    Behavioral Health Treatment Plan [facility]: Diagnosis and Treatment Plan explained to Shawna Yee relates understanding diagnosis and is agreeable to Treatment Plan   Yes

## 2021-06-09 ENCOUNTER — OFFICE VISIT (OUTPATIENT)
Dept: FAMILY MEDICINE CLINIC | Facility: CLINIC | Age: 41
End: 2021-06-09

## 2021-06-09 VITALS
HEART RATE: 97 BPM | WEIGHT: 260 LBS | BODY MASS INDEX: 30.08 KG/M2 | DIASTOLIC BLOOD PRESSURE: 86 MMHG | RESPIRATION RATE: 16 BRPM | HEIGHT: 78 IN | SYSTOLIC BLOOD PRESSURE: 142 MMHG | TEMPERATURE: 98.6 F | OXYGEN SATURATION: 99 %

## 2021-06-09 DIAGNOSIS — M25.511 ACUTE PAIN OF RIGHT SHOULDER: ICD-10-CM

## 2021-06-09 DIAGNOSIS — Z00.00 ANNUAL PHYSICAL EXAM: ICD-10-CM

## 2021-06-09 DIAGNOSIS — Z78.9 VEGAN DIET: Primary | ICD-10-CM

## 2021-06-09 PROCEDURE — 3725F SCREEN DEPRESSION PERFORMED: CPT | Performed by: FAMILY MEDICINE

## 2021-06-09 PROCEDURE — 99396 PREV VISIT EST AGE 40-64: CPT | Performed by: FAMILY MEDICINE

## 2021-06-09 NOTE — ASSESSMENT & PLAN NOTE
Patient is here for annual physical exam     Patient has no current complaints or concerns besides right-sided shoulder pain  Patient would like lab work since it has been approximately 4 years since routine labs were done  Ordered CBC due to vegan diet and ruling out macrocytosis, CMP checking for electrolyte abnormalities/ renal dysfunction/ hepatic dysfunction, TSH her routine screening and lipid panel due to age greater than 36 and some family history

## 2021-06-09 NOTE — PROGRESS NOTES
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  Performed some OMT today to see how that would help regarding musculoskeletal pain, sent in Voltaren 1% gel  To use topically as needed  Annual physical exam       Patient is here for annual physical exam     Patient has no current complaints or concerns besides right-sided shoulder pain  Patient would like lab work since it has been approximately 4 years since routine labs were done  Ordered CBC due to vegan diet and ruling out macrocytosis, CMP checking for electrolyte abnormalities/ renal dysfunction/ hepatic dysfunction, TSH her routine screening and lipid panel due to age greater than 36 and some family history  BMI 30 0-30 9,adult     Body mass index is 30 05 kg/m²  Performed nutritional and exercise counseling regarding current BMI, obese  Immunizations and preventive care screenings were discussed with patient today  Appropriate education was printed on patient's after visit summary  Counseling:  Alcohol/drug use: discussed moderation in alcohol intake, the recommendations for healthy alcohol use, and avoidance of illicit drug use  Dental Health: discussed importance of regular tooth brushing, flossing, and dental visits    Injury prevention: discussed safety/seat belts, safety helmets, smoke detectors, carbon dioxide detectors, and smoking near bedding or upholstery  · Exercise: the importance of regular exercise/physical activity was discussed  Recommend exercise 3-5 times per week for at least 30 minutes  BMI Counseling: Body mass index is 30 05 kg/m²  The BMI is above normal  Nutrition recommendations include encouraging healthy choices of fruits and vegetables and consuming healthier snacks  Exercise recommendations include exercising 3-5 times per week  Return in 1 week (on 6/16/2021)  Chief Complaint:     Chief Complaint   Patient presents with    Annual Exam    Shoulder Pain     right       History of Present Illness:     Adult Annual Physical   Patient here for a comprehensive physical exam  The patient reports no problems  Diet and Physical Activity  · Diet/Nutrition: well balanced diet and VEGAN diet  · Exercise: moderate cardiovascular exercise and 3-4 times a week on average  Depression Screening  PHQ-9 Depression Screening    PHQ-9:   Frequency of the following problems over the past two weeks:      Little interest or pleasure in doing things: 0 - not at all  Feeling down, depressed, or hopeless: 0 - not at all  PHQ-2 Score: 0       General Health  · Sleep: sleeps well and gets 7-8 hours of sleep on average  · Hearing: normal - bilateral   · Vision: no vision problems  · Dental: regular dental visits and brushes teeth twice daily   Health  · Symptoms include: none     Review of Systems:     Review of Systems   Constitutional: Negative for chills and fever  HENT: Negative for ear pain and sore throat  Eyes: Negative for pain and visual disturbance  Respiratory: Negative for cough and shortness of breath  Cardiovascular: Negative for chest pain and palpitations  Gastrointestinal: Negative for abdominal pain and vomiting  Genitourinary: Negative for dysuria and hematuria  Musculoskeletal: Positive for arthralgias and myalgias   Negative for back pain         Shoulder pain   Skin: Negative for color change and rash  Neurological: Negative for seizures and syncope  All other systems reviewed and are negative  Past Medical History:     Past Medical History:   Diagnosis Date    Bile duct abnormality     leaking    Hearing loss     Perforated gallbladder     Resolved: 11/28/17      Past Surgical History:     Past Surgical History:   Procedure Laterality Date    BACK SURGERY      CHOLECYSTECTOMY      CHOLECYSTECTOMY LAPAROSCOPIC N/A 11/8/2017    Procedure: CHOLECYSTECTOMY LAPAROSCOPIC, CONVERTED TO OPEN CHOLECYSTECTOMY, REPAIR UMBLICAL HERNIA  AND LYSIS OF ADHESIONS;  Surgeon: Sarah Dewitt MD;  Location: [facility];  Service: General    ERCP N/A 11/10/2017    Procedure: ENDOSCOPIC RETROGRADE CHOLANGIOPANCREATOGRAPHY (ERCP); Surgeon: Judd Sears MD;  Location: [facility]; Service: Gastroenterology    ESOPHAGOGASTRODUODENOSCOPY      HERNIA REPAIR      KNEE ARTHROSCOPY      LIPOMA RESECTION      neck    MD ESOPHAGOGASTRODUODENOSCOPY TRANSORAL DIAGNOSTIC N/A 4/13/2018    Procedure: ESOPHAGOGASTRODUODENOSCOPY (EGD); Surgeon: Judd Sears MD;  [facility];   Service: Gastroenterology    TONSILLECTOMY AND ADENOIDECTOMY      VASECTOMY        Family History:     Family History   Problem Relation Age of Onset    Bipolar disorder Mother         Affective    Asthma Father     Alcohol abuse Father     Post-traumatic stress disorder Father     Bipolar disorder Family       Social History:        Social History     Socioeconomic History    Marital status: /Civil Union     Spouse name: None    Number of children: None    Years of education: None    Highest education level: None   Occupational History    Occupation: Full-time employment   Social Needs    Financial resource strain: None    Food insecurity     Worry: None     Inability: None    Transportation needs     Medical: None     Non-medical: None Tobacco Use    Smoking status: Never Smoker    Smokeless tobacco: Never Used    Tobacco comment: Per [employer]: Former smoker   Substance and Sexual Activity    Alcohol use: Yes     Comment: few a month/social    Drug use: No    Sexual activity: None   Lifestyle    Physical activity     Days per week: None     Minutes per session: None    Stress: None   Relationships    Social connections     Talks on phone: None     Gets together: None     Attends Restoration service: None     Active member of club or organization: None     Attends meetings of clubs or organizations: None     Relationship status: None    Intimate partner violence     Fear of current or ex partner: None     Emotionally abused: None     Physically abused: None     Forced sexual activity: None   Other Topics Concern    None   Social History Narrative    Always uses seatbelt      Current Medications:     Current Outpatient Medications   Medication Sig Dispense Refill    Multiple Vitamins-Minerals (MULTIVITAMIN ADULT PO) Take by mouth       No current facility-administered medications for this visit  Allergies: Allergies   Allergen Reactions    Penicillins Hives    Percocet [Oxycodone-Acetaminophen] GI Intolerance      Physical Exam:     /86 (BP Location: Left arm, Patient Position: Sitting, Cuff Size: Standard)   Pulse 97   Temp 98 6 °F (37 °C) (Temporal)   Resp 16   Ht 6' 6" (1 981 m)   Wt 118 kg (260 lb)   SpO2 99%   BMI 30 05 kg/m²     Physical Exam  Vitals signs and nursing note reviewed  Constitutional:       General: He is not in acute distress  Appearance: Normal appearance  He is well-developed  He is not ill-appearing  HENT:      Head: Normocephalic and atraumatic  Eyes:      Conjunctiva/sclera: Conjunctivae normal    Neck:      Musculoskeletal: Neck supple  Cardiovascular:      Rate and Rhythm: Normal rate and regular rhythm  Pulses: Normal pulses  Heart sounds: Normal heart sounds  No murmur  Pulmonary:      Effort: Pulmonary effort is normal  No respiratory distress  Breath sounds: Normal breath sounds  Chest:      Chest wall: No tenderness  Abdominal:      Palpations: Abdomen is soft  There is no mass  Tenderness: There is no abdominal tenderness  Musculoskeletal:         General: Tenderness present  Right lower leg: No edema  Left lower leg: No edema  Comments: Limited flexion, abduction and internal rotation of R shoulder  OMT performed  Skin:     General: Skin is warm and dry  Neurological:      Mental Status: He is alert and oriented to person, place, and time  Motor: No weakness        Gait: Gait normal    Psychiatric:         Mood and Affect: Mood normal          Behavior: Behavior normal           Korina Hyde DO  [address]

## 2021-06-09 NOTE — ASSESSMENT & PLAN NOTE
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

## 2021-06-09 NOTE — ASSESSMENT & PLAN NOTE
Body mass index is 30 05 kg/m²  Performed nutritional and exercise counseling regarding current BMI, obese

## 2021-06-09 NOTE — PATIENT INSTRUCTIONS

## 2021-06-11 ENCOUNTER — APPOINTMENT (OUTPATIENT)
Dept: LAB | Age: 41
End: 2021-06-11
Payer: COMMERCIAL

## 2021-06-11 DIAGNOSIS — Z78.9 VEGAN DIET: ICD-10-CM

## 2021-06-11 LAB
ALBUMIN SERPL BCP-MCNC: 4.1 G/DL (ref 3.5–5)
ALP SERPL-CCNC: 67 U/L (ref 46–116)
ALT SERPL W P-5'-P-CCNC: 22 U/L (ref 12–78)
ANION GAP SERPL CALCULATED.3IONS-SCNC: 3 MMOL/L (ref 4–13)
AST SERPL W P-5'-P-CCNC: 16 U/L (ref 5–45)
BASOPHILS # BLD AUTO: 0.06 THOUSANDS/ΜL (ref 0–0.1)
BASOPHILS NFR BLD AUTO: 1 % (ref 0–1)
BILIRUB SERPL-MCNC: 1.3 MG/DL (ref 0.2–1)
BUN SERPL-MCNC: 8 MG/DL (ref 5–25)
CALCIUM SERPL-MCNC: 9.2 MG/DL (ref 8.3–10.1)
CHLORIDE SERPL-SCNC: 108 MMOL/L (ref 100–108)
CHOLEST SERPL-MCNC: 116 MG/DL (ref 50–200)
CO2 SERPL-SCNC: 29 MMOL/L (ref 21–32)
CREAT SERPL-MCNC: 0.96 MG/DL (ref 0.6–1.3)
EOSINOPHIL # BLD AUTO: 0.26 THOUSAND/ΜL (ref 0–0.61)
EOSINOPHIL NFR BLD AUTO: 5 % (ref 0–6)
ERYTHROCYTE [DISTWIDTH] IN BLOOD BY AUTOMATED COUNT: 12.4 % (ref 11.6–15.1)
GFR SERPL CREATININE-BSD FRML MDRD: 97 ML/MIN/1.73SQ M
GLUCOSE P FAST SERPL-MCNC: 102 MG/DL (ref 65–99)
HCT VFR BLD AUTO: 43.6 % (ref 36.5–49.3)
HDLC SERPL-MCNC: 23 MG/DL
HGB BLD-MCNC: 15.4 G/DL (ref 12–17)
IMM GRANULOCYTES # BLD AUTO: 0.01 THOUSAND/UL (ref 0–0.2)
IMM GRANULOCYTES NFR BLD AUTO: 0 % (ref 0–2)
LDLC SERPL CALC-MCNC: 46 MG/DL (ref 0–100)
LYMPHOCYTES # BLD AUTO: 2.4 THOUSANDS/ΜL (ref 0.6–4.47)
LYMPHOCYTES NFR BLD AUTO: 42 % (ref 14–44)
MCH RBC QN AUTO: 30.4 PG (ref 26.8–34.3)
MCHC RBC AUTO-ENTMCNC: 35.3 G/DL (ref 31.4–37.4)
MCV RBC AUTO: 86 FL (ref 82–98)
MONOCYTES # BLD AUTO: 0.4 THOUSAND/ΜL (ref 0.17–1.22)
MONOCYTES NFR BLD AUTO: 7 % (ref 4–12)
NEUTROPHILS # BLD AUTO: 2.57 THOUSANDS/ΜL (ref 1.85–7.62)
NEUTS SEG NFR BLD AUTO: 45 % (ref 43–75)
NONHDLC SERPL-MCNC: 93 MG/DL
NRBC BLD AUTO-RTO: 0 /100 WBCS
PLATELET # BLD AUTO: 245 THOUSANDS/UL (ref 149–390)
PMV BLD AUTO: 11.4 FL (ref 8.9–12.7)
POTASSIUM SERPL-SCNC: 3.8 MMOL/L (ref 3.5–5.3)
PROT SERPL-MCNC: 7.5 G/DL (ref 6.4–8.2)
RBC # BLD AUTO: 5.06 MILLION/UL (ref 3.88–5.62)
SODIUM SERPL-SCNC: 140 MMOL/L (ref 136–145)
TRIGL SERPL-MCNC: 235 MG/DL
TSH SERPL DL<=0.05 MIU/L-ACNC: 1.99 UIU/ML (ref 0.36–3.74)
WBC # BLD AUTO: 5.7 THOUSAND/UL (ref 4.31–10.16)

## 2021-06-11 PROCEDURE — 85025 COMPLETE CBC W/AUTO DIFF WBC: CPT

## 2021-06-11 PROCEDURE — 80061 LIPID PANEL: CPT

## 2021-06-11 PROCEDURE — 80053 COMPREHEN METABOLIC PANEL: CPT

## 2021-06-11 PROCEDURE — 84443 ASSAY THYROID STIM HORMONE: CPT

## 2021-06-11 PROCEDURE — 36415 COLL VENOUS BLD VENIPUNCTURE: CPT

## 2021-06-17 ENCOUNTER — OFFICE VISIT (OUTPATIENT)
Dept: FAMILY MEDICINE CLINIC | Facility: CLINIC | Age: 41
End: 2021-06-17

## 2021-06-17 VITALS
DIASTOLIC BLOOD PRESSURE: 98 MMHG | BODY MASS INDEX: 29.23 KG/M2 | HEART RATE: 91 BPM | SYSTOLIC BLOOD PRESSURE: 148 MMHG | HEIGHT: 78 IN | TEMPERATURE: 98.7 F | WEIGHT: 252.6 LBS | OXYGEN SATURATION: 99 % | RESPIRATION RATE: 18 BRPM

## 2021-06-17 DIAGNOSIS — M62.838 MUSCLE SPASM: Primary | ICD-10-CM

## 2021-06-17 PROCEDURE — 99213 OFFICE O/P EST LOW 20 MIN: CPT | Performed by: FAMILY MEDICINE

## 2021-06-17 PROCEDURE — 3008F BODY MASS INDEX DOCD: CPT | Performed by: FAMILY MEDICINE

## 2021-06-17 PROCEDURE — 1036F TOBACCO NON-USER: CPT | Performed by: FAMILY MEDICINE

## 2021-06-17 RX ORDER — CYCLOBENZAPRINE HCL 5 MG
5 TABLET ORAL 3 TIMES DAILY PRN
Qty: 30 TABLET | Refills: 0 | Status: SHIPPED | OUTPATIENT
Start: 2021-06-17 | End: 2022-02-15

## 2021-06-17 NOTE — PATIENT INSTRUCTIONS
Back Pain   WHAT YOU NEED TO KNOW:   Back pain is common  It can be caused by many conditions, such as arthritis or the breakdown of spinal discs  Your risk for back pain is increased by injuries, lack of activity, or repeated bending and twisting  You may feel sore or stiff on one or both sides of your back  The pain may spread to your buttocks or thighs  DISCHARGE INSTRUCTIONS:   Return to the emergency department if:   · You have pain, numbness, or weakness in one or both legs  · Your pain becomes so severe that you cannot walk  · You cannot control your urine or bowel movements  · You have severe back pain with chest pain  · You have severe back pain, nausea, and vomiting  · You have severe back pain that spreads to your side or genital area  Contact your healthcare provider if:   · You have back pain that does not get better with rest and pain medicine  · You have a fever  · You have pain that worsens when you are on your back or when you rest     · You have pain that worsens when you cough or sneeze  · You lose weight without trying  · You have questions or concerns about your condition or care  Medicines:   · NSAIDs  help decrease swelling and pain  This medicine is available with or without a doctor's order  NSAIDs can cause stomach bleeding or kidney problems in certain people  If you take blood thinner medicine, always ask your healthcare provider if NSAIDs are safe for you  Always read the medicine label and follow directions  · Acetaminophen  decreases pain and fever  It is available without a doctor's order  Ask how much to take and how often to take it  Follow directions  Read the labels of all other medicines you are using to see if they also contain acetaminophen, or ask your doctor or pharmacist  Acetaminophen can cause liver damage if not taken correctly  Do not use more than 4 grams (4,000 milligrams) total of acetaminophen in one day       · Muscle relaxers help decrease muscle spasms and back pain  · Prescription pain medicine  may be given  Ask your healthcare provider how to take this medicine safely  Some prescription pain medicines contain acetaminophen  Do not take other medicines that contain acetaminophen without talking to your healthcare provider  Too much acetaminophen may cause liver damage  Prescription pain medicine may cause constipation  Ask your healthcare provider how to prevent or treat constipation  · Take your medicine as directed  Contact your healthcare provider if you think your medicine is not helping or if you have side effects  Tell him or her if you are allergic to any medicine  Keep a list of the medicines, vitamins, and herbs you take  Include the amounts, and when and why you take them  Bring the list or the pill bottles to follow-up visits  Carry your medicine list with you in case of an emergency  How to manage your back pain:   · Apply ice  on your back for 15 to 20 minutes every hour or as directed  Use an ice pack, or put crushed ice in a plastic bag  Cover it with a towel before you apply it to your skin  Ice helps prevent tissue damage and decreases pain  · Apply heat  on your back for 20 to 30 minutes every 2 hours for as many days as directed  Heat helps decrease pain and muscle spasms  · Stay active  as much as you can without causing more pain  Bed rest could make your back pain worse  Avoid heavy lifting until your pain is gone  · Go to physical therapy as directed  A physical therapist can teach you exercises to help improve movement and strength, and to decrease pain  Follow up with your healthcare provider in 2 weeks, or as directed:  Write down your questions so you remember to ask them during your visits  © Copyright [address] Information is for End User's use only and may not be sold, redistributed or otherwise used for commercial purposes   All illustrations and images included in CareNotes® are the copyrighted property of [employer]  or Lanie Garcia   The above information is an  only  It is not intended as medical advice for individual conditions or treatments  Talk to your doctor, nurse or pharmacist before following any medical regimen to see if it is safe and effective for you

## 2021-06-17 NOTE — PROGRESS NOTES
Assessment/Plan:    Muscle spasm   Upper back musculature tense as well as palpable knot above right scapula  I believe muscle spasm likely compressing nerve causing   Referred shoulder pain and radiculopathy down arm  Recommended heat to the loosen muscle and also had massage area with either tennis ball of foam roller  Will send Flexeril to the pharmacy  Also recommended lidocaine patch and over-the-counter anti inflammatory  Follow-up the as needed if not resolved  Consider OMT  Or physical therapyif not resolved  Problem List Items Addressed This Visit        Other    Muscle spasm - Primary      Upper back musculature tense as well as palpable knot above right scapula  I believe muscle spasm likely compressing nerve causing   Referred shoulder pain and radiculopathy down arm  Recommended heat to the loosen muscle and also had massage area with either tennis ball of foam roller  Will send Flexeril to the pharmacy  Also recommended lidocaine patch and over-the-counter anti inflammatory  Follow-up the as needed if not resolved  Consider OMT  Or physical therapyif not resolved  Relevant Medications    cyclobenzaprine (FLEXERIL) 5 mg tablet            Subjective:      Patient ID: Hyacinth Norman is a 41 y o  male  Presents to clinic follow-up on back and shoulder pain  Patient admits right upper back pain that sometimes radiates to shoulder and arm  Pain in the back is sharp  He had office visit last week describe Voltaren gel but did not help  Denies any neck pain  No weakness or decreased range of motion of shoulder  He massages the area in his upper back and this helps his symptoms  Denies any trauma or heavy lifting recently        The following portions of the patient's history were reviewed and updated as appropriate: allergies, current medications, past family history, past medical history, past social history, past surgical history and problem list     Review of Systems Constitutional: Negative for chills and fever  Respiratory: Negative for cough and shortness of breath  Cardiovascular: Negative for chest pain and palpitations  Gastrointestinal: Negative for abdominal pain, nausea and vomiting  Musculoskeletal: Positive for back pain  Negative for joint swelling, neck pain and neck stiffness  Neurological: Negative for headaches  All other systems reviewed and are negative  Objective:      /98 (BP Location: Left arm, Patient Position: Sitting, Cuff Size: Large)   Pulse 91   Temp 98 7 °F (37 1 °C) (Temporal)   Resp 18   Ht 6' 6" (1 981 m)   Wt 115 kg (252 lb 9 6 oz)   SpO2 99%   BMI 29 19 kg/m²          Physical Exam  Vitals and nursing note reviewed  Constitutional:       General: He is not in acute distress  Appearance: Normal appearance  He is not ill-appearing, toxic-appearing or diaphoretic  Cardiovascular:      Rate and Rhythm: Normal rate and regular rhythm  Pulses: Normal pulses  Heart sounds: Normal heart sounds  Pulmonary:      Effort: Pulmonary effort is normal       Breath sounds: Normal breath sounds  Musculoskeletal:         General: Tenderness present  No deformity  Comments: Palpable knot  felt on upper right back above scapula  Surrounding area does feel tense as well  Full range of motion of shoulder Quiroga and arc test negative  Rotator cuff intact  No cervical spine tenderness  Neurological:      Mental Status: He is alert

## 2021-06-17 NOTE — ASSESSMENT & PLAN NOTE
Upper back musculature tense as well as palpable knot above right scapula  I believe muscle spasm likely compressing nerve causing   Referred shoulder pain and radiculopathy down arm  Recommended heat to the loosen muscle and also had massage area with either tennis ball of foam roller  Will send Flexeril to the pharmacy  Also recommended lidocaine patch and over-the-counter anti inflammatory  Follow-up the as needed if not resolved  Consider OMT  Or physical therapyif not resolved

## 2022-02-15 ENCOUNTER — OFFICE VISIT (OUTPATIENT)
Dept: FAMILY MEDICINE CLINIC | Facility: CLINIC | Age: 42
End: 2022-02-15

## 2022-02-15 VITALS
RESPIRATION RATE: 18 BRPM | BODY MASS INDEX: 31.84 KG/M2 | HEIGHT: 78 IN | OXYGEN SATURATION: 98 % | SYSTOLIC BLOOD PRESSURE: 158 MMHG | HEART RATE: 94 BPM | WEIGHT: 275.2 LBS | TEMPERATURE: 97.3 F | DIASTOLIC BLOOD PRESSURE: 94 MMHG

## 2022-02-15 DIAGNOSIS — L85.8 KERATOACANTHOMA: Primary | ICD-10-CM

## 2022-02-15 PROCEDURE — 1036F TOBACCO NON-USER: CPT | Performed by: FAMILY MEDICINE

## 2022-02-15 PROCEDURE — 99213 OFFICE O/P EST LOW 20 MIN: CPT | Performed by: FAMILY MEDICINE

## 2022-02-15 PROCEDURE — 3008F BODY MASS INDEX DOCD: CPT | Performed by: FAMILY MEDICINE

## 2022-02-15 NOTE — PROGRESS NOTES
Assessment/Plan:    Keratoacanthoma  Explained benign keratoacanthoma  Reassurance provided  However, will schedule for biopsy given changes in size and interference with shaving or hair cut  Follow-up appointments:     Future Appointments   Date Time Provider Gwyn Mercado   3/2/2022  3:50 Padma White MD [facility]           Diagnoses and all orders for this visit:    Keratoacanthoma          Subjective:      Patient ID: Jason Conrad is a 35 y o  male  HPI  Patient presents to the office today for his growing skin mole  He reports having a mole on right forehead for years, but getting bigger recently  Denies chills, weight change  The following portions of the patient's history were reviewed and updated as appropriate:   He  has a past medical history of Bile duct abnormality, Hearing loss, and Perforated gallbladder  He  has a past surgical history that includes CHOLECYSTECTOMY LAPAROSCOPIC (N/A, 11/8/2017); Knee arthroscopy; Lipoma resection; Vasectomy; ERCP (N/A, 11/10/2017); Cholecystectomy; Hernia repair; Esophagogastroduodenoscopy; pr esophagogastroduodenoscopy transoral diagnostic (N/A, 4/13/2018); Back surgery; and Tonsillectomy and adenoidectomy  He  reports that he has never smoked  He has never used smokeless tobacco  He reports current alcohol use  He reports that he does not use drugs  Current Outpatient Medications   Medication Sig Dispense Refill    Multiple Vitamins-Minerals (MULTIVITAMIN ADULT PO) Take by mouth       No current facility-administered medications for this visit  He is allergic to penicillins and percocet [oxycodone-acetaminophen]       Review of Systems   Constitutional: Negative for chills, diaphoresis, fever and unexpected weight change  Respiratory: Negative  Cardiovascular: Negative  Gastrointestinal: Negative  Skin:        A skin mole on right temple   Hematological: Negative            Objective:      /94 (BP Location: Right arm, Patient Position: Sitting, Cuff Size: Large)   Pulse 94   Temp (!) 97 3 °F (36 3 °C) (Temporal)   Resp 18   Ht 6' 6" (1 981 m)   Wt 125 kg (275 lb 3 2 oz)   SpO2 98%   BMI 31 80 kg/m²          Physical Exam  Constitutional:       General: He is not in acute distress  HENT:      Head: Normocephalic and atraumatic  Cardiovascular:      Rate and Rhythm: Normal rate  Pulmonary:      Effort: No respiratory distress  Skin:     General: Skin is warm  Comments: A #3mm raised papule on hair line right temporal area   Neurological:      Mental Status: He is alert

## 2022-02-15 NOTE — ASSESSMENT & PLAN NOTE
Explained benign keratoacanthoma  Reassurance provided  However, will schedule for biopsy given changes in size and interference with shaving or hair cut

## 2022-02-15 NOTE — PATIENT INSTRUCTIONS
Mole or Nevus Excision   AMBULATORY CARE:   What you need to know about mole excision:  Mole excision is a procedure done to remove a mole (nevus) from your skin  You may need a mole removed to check it for cancer or to decrease tenderness  You may also have a mole removed for cosmetic reasons  What will happen during mole excision:  You may be given local anesthesia to numb your skin  With local anesthesia, you may still feel pressure or pushing, but you should not feel any pain  Your healthcare provider may also use liquid nitrogen to freeze and numb your skin  He or she will cut and remove your mole  He or she will then close the incision with stitches  The mole may be sent to a lab for tests  What will happen after mole excision:  You may have medicine put on your skin to prevent an infection  You may need to keep a bandage over your wound until it heals  Risks of mole excision:  You may bleed more than expected or get an infection  You may have swelling and changes in the color of your skin where your mole was removed  Fluid may drain from your wound  A scar may form in the area where your mole was removed  Even after surgery, your mole may grow back  Seek care immediately if:   · You have worsening redness, pain, or swelling at your wound site  · You have pus in your wound  · Blood soaks through your bandage  Contact your healthcare provider if:   · You have a fever  · Your mole grows back  · You have questions or concerns about your condition or care  Medicines: You may need any of the following:  · Topical antibiotics  help prevent or treat a bacterial infection  · NSAIDs , such as ibuprofen, help decrease swelling, pain, and fever  NSAIDs can cause stomach bleeding or kidney problems in certain people  If you take blood thinner medicine, always ask your healthcare provider if NSAIDs are safe for you  Always read the medicine label and follow directions      · Acetaminophen decreases pain and fever  It is available without a doctor's order  Ask how much to take and how often to take it  Follow directions  Read the labels of all other medicines you are using to see if they also contain acetaminophen, or ask your doctor or pharmacist  Acetaminophen can cause liver damage if not taken correctly  Do not use more than 4 grams (4,000 milligrams) total of acetaminophen in one day  · Take your medicine as directed  Contact your healthcare provider if you think your medicine is not helping or if you have side effects  Tell him or her if you are allergic to any medicine  Keep a list of the medicines, vitamins, and herbs you take  Include the amounts, and when and why you take them  Bring the list or the pill bottles to follow-up visits  Carry your medicine list with you in case of an emergency  Wound care:  Ask your healthcare provider how to care for your wound and how long to keep the bandage on  Prevent new moles:   · Reduce sun exposure  Wear protective clothing  Apply sunscreen before you go into the sun and at least every 2 hours while you are in the sun  Reapply after swimming or sweating  Limit the amount of time you spend in the sun  The ultraviolet (UV) light from the sun increases your risk for moles  · Check your skin every month  Know what your birthmarks and moles look like  Watch for and tell your healthcare provider if you notice changes in color, size, or shape of your birthmarks or moles  Follow up with your healthcare provider as directed: You may need to return to have stitches removed, your wound checked, or more tests done  Write down your questions so you remember to ask them during your visits  © Copyright [employer] 2021 Information is for End User's use only and may not be sold, redistributed or otherwise used for commercial purposes   All illustrations and images included in CareNotes® are the copyrighted property of [employer]  or [employer]  The above information is an  only  It is not intended as medical advice for individual conditions or treatments  Talk to your doctor, nurse or pharmacist before following any medical regimen to see if it is safe and effective for you

## 2022-03-02 ENCOUNTER — PROCEDURE VISIT (OUTPATIENT)
Dept: FAMILY MEDICINE CLINIC | Facility: CLINIC | Age: 42
End: 2022-03-02

## 2022-03-02 VITALS
OXYGEN SATURATION: 99 % | HEIGHT: 78 IN | TEMPERATURE: 98.3 F | HEART RATE: 83 BPM | BODY MASS INDEX: 31.33 KG/M2 | SYSTOLIC BLOOD PRESSURE: 148 MMHG | DIASTOLIC BLOOD PRESSURE: 93 MMHG | RESPIRATION RATE: 18 BRPM | WEIGHT: 270.8 LBS

## 2022-03-02 DIAGNOSIS — L85.8 KERATOACANTHOMA: Primary | ICD-10-CM

## 2022-03-02 PROCEDURE — 88305 TISSUE EXAM BY PATHOLOGIST: CPT | Performed by: PATHOLOGY

## 2022-03-02 PROCEDURE — 11310 SHAVE SKIN LESION 0.5 CM/<: CPT | Performed by: FAMILY MEDICINE

## 2022-03-02 PROCEDURE — 3008F BODY MASS INDEX DOCD: CPT | Performed by: FAMILY MEDICINE

## 2022-03-02 NOTE — PATIENT INSTRUCTIONS
Skin biopsy   WHAT YOU NEED TO KNOW:   A skin biopsy is a procedure used to remove a small piece of skin for testing  Part or all of a skin lesion (affected area of skin) may be removed  A punch biopsy allows the whole thickness of a very small piece skin to be taken  DISCHARGE INSTRUCTIONS:   Medicines:   · NSAIDs , such as ibuprofen, help decrease swelling, pain, and fever  This medicine is available with or without a doctor's order  NSAIDs can cause stomach bleeding or kidney problems in certain people  If you take blood thinner medicine, always ask if NSAIDs are safe for you  Always read the medicine label and follow directions  Do not give these medicines to children under 10months of age without direction from your child's healthcare provider  · Acetaminophen: This medicine decreases pain  Acetaminophen is available without a doctor's order  Ask how much to take and how often to take it  Follow directions  Acetaminophen can cause liver damage if not taken correctly  · Take your medicine as directed  Contact your healthcare provider if you think your medicine is not helping or if you have side effects  Tell him of her if you are allergic to any medicine  Keep a list of the medicines, vitamins, and herbs you take  Include the amounts, and when and why you take them  Bring the list or the pill bottles to follow-up visits  Carry your medicine list with you in case of an emergency  Follow up with your healthcare provider or dermatologist as directed: You may need to return to have your stitches removed  Write down your questions so you remember to ask them during your visits  Wound care:  Carefully wash the wound with soap and water  Dry the area and put on new, clean bandages as directed  Change your bandages when they get wet or dirty  Contact your healthcare provider if:   · You have a fever  · You have increased swelling, redness, or bleeding from your wound      · You have pain that does not go away, or is not helped by pain medicines  · You have yellow or green drainage coming out of your wound  · You have questions or concerns about your condition or care  Return to the emergency department if:   · You have red lines on your skin coming from your wound area  · Blood soaks through your bandage  © 2017 [employer] Information is for End User's use only and may not be sold, redistributed or otherwise used for commercial purposes  All illustrations and images included in CareNotes® are the copyrighted property of [employer]  or [employer]  The above information is an  only  It is not intended as medical advice for individual conditions or treatments  Talk to your doctor, nurse or pharmacist before following any medical regimen to see if it is safe and effective for you

## 2023-11-21 ENCOUNTER — OFFICE VISIT (OUTPATIENT)
Dept: FAMILY MEDICINE CLINIC | Facility: CLINIC | Age: 43
End: 2023-11-21

## 2023-11-21 VITALS
HEART RATE: 80 BPM | SYSTOLIC BLOOD PRESSURE: 174 MMHG | TEMPERATURE: 97.9 F | WEIGHT: 269 LBS | BODY MASS INDEX: 31.12 KG/M2 | OXYGEN SATURATION: 98 % | HEIGHT: 78 IN | DIASTOLIC BLOOD PRESSURE: 100 MMHG

## 2023-11-21 DIAGNOSIS — Z00.00 ANNUAL PHYSICAL EXAM: Primary | ICD-10-CM

## 2023-11-21 DIAGNOSIS — Z13.6 SCREENING FOR CARDIOVASCULAR CONDITION: ICD-10-CM

## 2023-11-21 DIAGNOSIS — Z11.59 NEED FOR HEPATITIS C SCREENING TEST: ICD-10-CM

## 2023-11-21 DIAGNOSIS — R03.0 ELEVATED BLOOD PRESSURE READING IN OFFICE WITHOUT DIAGNOSIS OF HYPERTENSION: ICD-10-CM

## 2023-11-21 DIAGNOSIS — Z12.5 SCREENING PSA (PROSTATE SPECIFIC ANTIGEN): ICD-10-CM

## 2023-11-21 DIAGNOSIS — E66.9 OBESITY (BMI 30.0-34.9): ICD-10-CM

## 2023-11-21 DIAGNOSIS — Z11.4 ENCOUNTER FOR SCREENING FOR HIV: ICD-10-CM

## 2023-11-21 PROBLEM — S99.921A FOOT TRAUMA, RIGHT, INITIAL ENCOUNTER: Status: RESOLVED | Noted: 2018-05-16 | Resolved: 2023-11-21

## 2023-11-21 PROBLEM — K81.0 ACUTE CHOLECYSTITIS: Status: RESOLVED | Noted: 2017-11-07 | Resolved: 2023-11-21

## 2023-11-21 PROBLEM — M25.511 SHOULDER PAIN, RIGHT: Status: RESOLVED | Noted: 2021-06-09 | Resolved: 2023-11-21

## 2023-11-21 PROBLEM — M62.838 MUSCLE SPASM: Status: RESOLVED | Noted: 2021-06-17 | Resolved: 2023-11-21

## 2023-11-21 PROBLEM — K83.9 BILE LEAK: Status: RESOLVED | Noted: 2017-11-07 | Resolved: 2023-11-21

## 2023-11-21 PROCEDURE — 99396 PREV VISIT EST AGE 40-64: CPT | Performed by: FAMILY MEDICINE

## 2023-11-21 NOTE — ASSESSMENT & PLAN NOTE
Elevated BP in office 196/105 with manual repeat 174/100  Asymptomatic, patient attributes high BP to recent stress from work  Counseled on diet/exercise, pt does not drink caffeine  Will workup with basic blood work first and follow-up in 4-6 weeks.  If BP remains elevated, will start Lisinopril 10mg daily  Encouraged pt to get ambulatory blood pressures

## 2023-11-21 NOTE — ASSESSMENT & PLAN NOTE
37year old male presenting for physical. No current complaints   Social Hx: Denies Tobacco and substance use. Social drinker    Screenings:   - Colonoscopy: Pt defers screening at this time.  No fam hx of colon CA  - PSA ordered  - PHQ-2 score: negative score 0  Vaccines: Declined Flu vaccine  Will order: CBC, CMP, TSH, FLP, A1c, HIV, Hep C, PSA  Follow-up in 4-6 weeks

## 2023-11-21 NOTE — PROGRESS NOTES
[address] [facility]    NAME: Joon Fierro  AGE: 37 y.o. SEX: male  : 1980     DATE: 2023     Assessment and Plan:     Problem List Items Addressed This Visit          Other    Annual physical exam - Primary     37year old male presenting for physical. No current complaints   Social Hx: Denies Tobacco and substance use. Social drinker    Screenings:   - Colonoscopy: Pt defers screening at this time. No fam hx of colon CA  - PSA ordered  - PHQ-2 score: negative score 0  Vaccines: Declined Flu vaccine  Will order: CBC, CMP, TSH, FLP, A1c, HIV, Hep C, PSA  Follow-up in 4-6 weeks         Elevated blood pressure reading in office without diagnosis of hypertension     Elevated BP in office 196/105 with manual repeat 174/100  Asymptomatic, patient attributes high BP to recent stress from work  Counseled on diet/exercise, pt does not drink caffeine  Will workup with basic blood work first and follow-up in 4-6 weeks. If BP remains elevated, will start Lisinopril 10mg daily  Encouraged pt to get ambulatory blood pressures         Relevant Orders    Lipid panel    CBC and Platelet    Comprehensive metabolic panel    TSH, 3rd generation with Free T4 reflex     Other Visit Diagnoses       Screening for cardiovascular condition        Relevant Orders    Lipid panel    Hemoglobin A1C    Obesity (BMI 30.0-34. 9)        Relevant Orders    Lipid panel    TSH, 3rd generation with Free T4 reflex    Encounter for screening for HIV        Relevant Orders    HIV 1/2 AB/AG w Reflex [facility] for 2 yr old and above    Screening PSA (prostate specific antigen)        Relevant Orders    PSA, Total Screen    Need for hepatitis C screening test        Relevant Orders    Hepatitis C antibody            Immunizations and preventive care screenings were discussed with patient today.  Appropriate education was printed on patient's after visit summary. Discussed risks and benefits of prostate cancer screening. We discussed the controversial history of PSA screening for prostate cancer in the [facility] as well as the risk of over detection and over treatment of prostate cancer by way of PSA screening. The patient understands that PSA blood testing is an imperfect way to screen for prostate cancer and that elevated PSA levels in the blood may also be caused by infection, inflammation, prostatic trauma or manipulation, urological procedures, or by benign prostatic enlargement. The role of the digital rectal examination in prostate cancer screening was also discussed and I discussed with him that there is large interobserver variability in the findings of digital rectal examination. Counseling:  Alcohol/drug use: discussed moderation in alcohol intake, the recommendations for healthy alcohol use, and avoidance of illicit drug use. Dental Health: discussed importance of regular tooth brushing, flossing, and dental visits. Sexual health: discussed sexually transmitted diseases, partner selection, use of condoms, avoidance of unintended pregnancy, and contraceptive alternatives. Exercise: the importance of regular exercise/physical activity was discussed. Recommend exercise 3-5 times per week for at least 30 minutes. BMI Counseling: Body mass index is 31.09 kg/m². The BMI is above normal. Nutrition recommendations include decreasing portion sizes, encouraging healthy choices of fruits and vegetables, limiting drinks that contain sugar, moderation in carbohydrate intake and reducing intake of cholesterol. Exercise recommendations include moderate physical activity 150 minutes/week and exercising 3-5 times per week. No pharmacotherapy was ordered. Rationale for BMI follow-up plan is due to patient being overweight or obese. Depression Screening and Follow-up Plan: Patient was screened for depression during today's encounter.  They screened negative with a PHQ-2 score of 0. Return for 4-6 week follow-up for HTN, lab results. Chief Complaint:     Chief Complaint   Patient presents with    Annual Exam      History of Present Illness:     Adult Annual Physical   Patient here for a comprehensive physical exam. The patient reports no problems. Patient states after he had open abdominal surgery for cholecystectomy in 2017, he keeps developing recurring scabs on his belly button. Surgery team also fixed an umbilical hernia at the time. He denies pain, pruritus, growing rash; however, it keeps scabbing and falling off. He plans on seeing dermatology. Based on the presentation, could be a keloid. Patient was also noted to have elevated BP even on manual repeat. He is overall asymptomatic and believes it is related to stress from work. His job was recently told they have to cut down hours for everybody and as a supervisor, he is very stressed with trying to delegate, teach everybody. Diet and Physical Activity  Diet/Nutrition: well balanced diet. Exercise: walking, moderate cardiovascular exercise, and 3-4 times a week on average. Depression Screening  PHQ-2/9 Depression Screening    Little interest or pleasure in doing things: 0 - not at all  Feeling down, depressed, or hopeless: 0 - not at all  PHQ-2 Score: 0  PHQ-2 Interpretation: Negative depression screen       General Health  Sleep: sleeps well and gets 4-6 hours of sleep on average. Hearing: normal - bilateral.  Vision: no vision problems, most recent eye exam >1 year ago, and wears contacts. Dental: regular dental visits, brushes teeth twice daily, and flosses teeth occasionally.  Health  Symptoms include: none    Advanced Care Planning  Do you have an advanced directive? Completed "[place] for [employer] (but not documented in chart)  Do you have a durable medical power of ?  Verbally states his wife, Krissy Reaves, will be his POA     Review of Systems: Review of Systems   Constitutional:  Negative for chills and fever. HENT:  Negative for ear pain and sore throat. Eyes:  Negative for pain and visual disturbance. Respiratory:  Negative for cough and shortness of breath. Cardiovascular:  Negative for chest pain and palpitations. Gastrointestinal:  Negative for abdominal pain and vomiting. Genitourinary:  Negative for dysuria and hematuria. Musculoskeletal:  Negative for arthralgias and back pain. Skin:  Positive for color change. Negative for rash. Neurological:  Negative for seizures and syncope. All other systems reviewed and are negative. Past Medical History:     Past Medical History:   Diagnosis Date    Bile duct abnormality     leaking    Hearing loss     Perforated gallbladder     Resolved: 11/28/17      Past Surgical History:     Past Surgical History:   Procedure Laterality Date    BACK SURGERY      CHOLECYSTECTOMY      CHOLECYSTECTOMY LAPAROSCOPIC N/A 11/8/2017    Procedure: CHOLECYSTECTOMY LAPAROSCOPIC, CONVERTED TO OPEN CHOLECYSTECTOMY, REPAIR UMBLICAL HERNIA  AND LYSIS OF ADHESIONS;  Surgeon: Rufino Mcnally MD;  Location: [facility];  Service: General    ERCP N/A 11/10/2017    Procedure: ENDOSCOPIC RETROGRADE CHOLANGIOPANCREATOGRAPHY (ERCP); Surgeon: Eloisa Pizano MD;  Location: [facility]; Service: Gastroenterology    ESOPHAGOGASTRODUODENOSCOPY      HERNIA REPAIR      KNEE ARTHROSCOPY      LIPOMA RESECTION      neck    ND ESOPHAGOGASTRODUODENOSCOPY TRANSORAL DIAGNOSTIC N/A 4/13/2018    Procedure: ESOPHAGOGASTRODUODENOSCOPY (EGD); Surgeon: Eloisa Pizano MD;  Location: [facility];   Service: Gastroenterology    TONSILLECTOMY AND ADENOIDECTOMY      VASECTOMY        Family History:     Family History   Problem Relation Age of Onset    Bipolar disorder Mother         Affective    Asthma Father     Alcohol abuse Father     Post-traumatic stress disorder Father     Bipolar disorder Family       Social History: Social History     Socioeconomic History    Marital status: /Civil Union     Spouse name: None    Number of children: 2    Years of education: None    Highest education level: None   Occupational History    Occupation: Full-time employment   Tobacco Use    Smoking status: Never    Smokeless tobacco: Never    Tobacco comments:     Per [employer]: Former smoker   Vaping Use    Vaping Use: Never used   Substance and Sexual Activity    Alcohol use: Yes     Comment: few a month/social    Drug use: Never    Sexual activity: Yes     Partners: Female     Birth control/protection: Condom Male   Other Topics Concern    None   Social History Narrative    Always uses seatbelt     Social Determinants of Health     Financial Resource Strain: Low Risk  (11/21/2023)    Overall Financial Resource Strain (CARDIA)     Difficulty of Paying Living Expenses: Not hard at all   Food Insecurity: No Food Insecurity (11/21/2023)    Hunger Vital Sign     Worried About Running Out of Food in the Last Year: Never true     [address] in the Last Year: Never true   Transportation Needs: No Transportation Needs (11/21/2023)    PRAPARE - Transportation     Lack of Transportation (Medical): No     Lack of Transportation (Non-Medical): No   Physical Activity: Sufficiently Active (11/21/2023)    Exercise Vital Sign     Days of Exercise per Week: 2 days     Minutes of Exercise per Session: 90 min   Stress: No Stress Concern Present (11/21/2023)    [facility]     Feeling of Stress : Not at all   Social Connections:  Moderately Isolated (11/21/2023)    Social Connection and Isolation Panel [NHANES]     Frequency of Communication with Friends and Family: More than three times a week     Frequency of Social Gatherings with Friends and Family: More than three times a week     Attends [employer] Services: Never     Active Member of Clubs or Organizations: No     Attends Club or Organization Meetings: Never     Marital Status:    Intimate Partner Violence: Not At Risk (11/21/2023)    Humiliation, Afraid, Rape, and Kick questionnaire     Fear of Current or Ex-Partner: No     Emotionally Abused: No     Physically Abused: No     Sexually Abused: No   Housing Stability: Unknown (11/21/2023)    Housing Stability Vital Sign     Unable to Pay for Housing in the Last Year: No     Number of [address] in the Last Year: Not on file     Unstable Housing in the Last Year: No      Current Medications:     Current Outpatient Medications   Medication Sig Dispense Refill    Multiple Vitamins-Minerals (MULTIVITAMIN ADULT PO) Take by mouth       No current facility-administered medications for this visit. Allergies: Allergies   Allergen Reactions    Penicillins Hives    Percocet [Oxycodone-Acetaminophen] GI Intolerance      Physical Exam:     BP (!) 174/100   Pulse 80   Temp 97.9 °F (36.6 °C) (Temporal)   Ht 6' 6" (1.981 m)   Wt 122 kg (269 lb)   SpO2 98%   BMI 31.09 kg/m²     Physical Exam  Vitals reviewed. Constitutional:       General: He is not in acute distress. Appearance: Normal appearance. He is well-developed. He is not ill-appearing. HENT:      Head: Normocephalic and atraumatic. Right Ear: Tympanic membrane, ear canal and external ear normal.      Left Ear: Tympanic membrane, ear canal and external ear normal.      Nose: Nose normal. No congestion or rhinorrhea. Mouth/Throat:      Mouth: Mucous membranes are moist.      Pharynx: Oropharynx is clear. No oropharyngeal exudate or posterior oropharyngeal erythema. Eyes:      Extraocular Movements: Extraocular movements intact. Conjunctiva/sclera: Conjunctivae normal.      Pupils: Pupils are equal, round, and reactive to light. Cardiovascular:      Rate and Rhythm: Normal rate and regular rhythm. Heart sounds: Normal heart sounds. No murmur heard.   Pulmonary:      Effort: Pulmonary effort is normal. No respiratory distress. Breath sounds: Normal breath sounds. No wheezing or rales. Abdominal:      General: Abdomen is flat. Bowel sounds are normal.      Palpations: Abdomen is soft. Tenderness: There is no abdominal tenderness. Musculoskeletal:         General: No swelling. Normal range of motion. Cervical back: Neck supple. Right lower leg: No edema. Left lower leg: No edema. Skin:     General: Skin is warm and dry. Capillary Refill: Capillary refill takes less than 2 seconds. Findings: Lesion (scabbed lesion in umbilicus, appears to be a keloid. Nontender, no draiange or bleeding. No erythema) present. Neurological:      General: No focal deficit present. Mental Status: He is alert and oriented to person, place, and time.    Psychiatric:         Mood and Affect: Mood normal.         Behavior: Behavior normal.          Lindy Hampton MD  [facility]

## 2023-11-21 NOTE — PATIENT INSTRUCTIONS
Wellness Visit for Adults   AMBULATORY CARE:   A wellness visit  is when you see your healthcare provider to get screened for health problems. Your healthcare provider will also give you advice on how to stay healthy. Write down your questions so you remember to ask them. Ask your healthcare provider how often you should have a wellness visit. What happens at a wellness visit:  Your healthcare provider will ask about your health, and your family history of health problems. This includes high blood pressure, heart disease, and cancer. He or she will ask if you have symptoms that concern you, if you smoke, and about your mood. You may also be asked about your intake of medicines, supplements, food, and alcohol. Any of the following may be done: Your weight  will be checked. Your height may also be checked so your body mass index (BMI) can be calculated. Your BMI shows if you are at a healthy weight. Your blood pressure  and heart rate will be checked. Your temperature may also be checked. Blood and urine tests  may be done. Blood tests may be done to check your cholesterol levels. Abnormal cholesterol levels increase your risk for heart disease and stroke. You may also need a blood or urine test to check for diabetes if you are at increased risk. Urine tests may be done to look for signs of an infection or kidney disease. A physical exam  includes checking your heartbeat and lungs with a stethoscope. Your healthcare provider may also check your skin to look for sun damage. Screening tests  may be recommended. A screening test is done to check for diseases that may not cause symptoms. The screening tests you may need depend on your age, gender, family history, and lifestyle habits. For example, colorectal screening may be recommended if you are 48years old or older. Screening tests you need if you are a woman:   A Pap smear  is used to screen for cervical cancer.  Pap smears are usually done every 3 to 5 years depending on your age. You may need them more often if you have had abnormal Pap smear test results in the past. Ask your healthcare provider how often you should have a Pap smear. A mammogram  is an x-ray of your breasts to screen for breast cancer. Experts recommend mammograms every 2 years starting at age 48 years. You may need a mammogram at age 52 years or younger if you have an increased risk for breast cancer. Talk to your healthcare provider about when you should start having mammograms and how often you need them. Vaccines you may need:   Get an influenza vaccine  every year. The influenza vaccine protects you from the flu. Several types of viruses cause the flu. The viruses change over time, so new vaccines are made each year. Get a tetanus-diphtheria (Td) booster vaccine  every 10 years. This vaccine protects you against tetanus and diphtheria. Tetanus is a severe infection that may cause painful muscle spasms and lockjaw. Diphtheria is a severe bacterial infection that causes a thick covering in the back of your mouth and throat. Get a human papillomavirus (HPV) vaccine  if you are female and aged 23 to 32 or male 23 to 24 and never received it. This vaccine protects you from HPV infection. HPV is the most common infection spread by sexual contact. HPV may also cause vaginal, penile, and anal cancers. Get a pneumococcal vaccine  if you are aged 72 years or older. The pneumococcal vaccine is an injection given to protect you from pneumococcal disease. Pneumococcal disease is an infection caused by pneumococcal bacteria. The infection may cause pneumonia, meningitis, or an ear infection. Get a shingles vaccine  if you are 60 or older, even if you have had shingles before. The shingles vaccine is an injection to protect you from the varicella-zoster virus. This is the same virus that causes chickenpox.  Shingles is a painful rash that develops in people who had chickenpox or have been exposed to the virus. How to eat healthy:  My Plate is a model for planning healthy meals. It shows the types and amounts of foods that should go on your plate. Fruits and vegetables make up about half of your plate, and grains and protein make up the other half. A serving of dairy is included on the side of your plate. The amount of calories and serving sizes you need depends on your age, gender, weight, and height. Examples of healthy foods are listed below:  Eat a variety of vegetables  such as dark green, red, and orange vegetables. You can also include canned vegetables low in sodium (salt) and frozen vegetables without added butter or sauces. Eat a variety of fresh fruits , canned fruit in 100% juice, frozen fruit, and dried fruit. Include whole grains. At least half of the grains you eat should be whole grains. Examples include whole-wheat bread, wheat pasta, brown rice, and whole-grain cereals such as oatmeal.    Eat a variety of protein foods such as seafood (fish and shellfish), lean meat, and poultry without skin (turkey and chicken). Examples of lean meats include pork leg, shoulder, or tenderloin, and beef round, sirloin, tenderloin, and extra lean ground beef. Other protein foods include eggs and egg substitutes, beans, peas, soy products, nuts, and seeds. Choose low-fat dairy products such as skim or 1% milk or low-fat yogurt, cheese, and cottage cheese. Limit unhealthy fats  such as butter, hard margarine, and shortening. Exercise:  Exercise at least 30 minutes per day on most days of the week. Some examples of exercise include walking, biking, dancing, and swimming. You can also fit in more physical activity by taking the stairs instead of the elevator or parking farther away from stores. Include muscle strengthening activities 2 days each week. Regular exercise provides many health benefits.  It helps you manage your weight, and decreases your risk for type 2 diabetes, heart disease, stroke, and high blood pressure. Exercise can also help improve your mood. Ask your healthcare provider about the best exercise plan for you. General health and safety guidelines:   Do not smoke. Nicotine and other chemicals in cigarettes and cigars can cause lung damage. Ask your healthcare provider for information if you currently smoke and need help to quit. E-cigarettes or smokeless tobacco still contain nicotine. Talk to your healthcare provider before you use these products. Limit alcohol. A drink of alcohol is 12 ounces of beer, 5 ounces of wine, or 1½ ounces of liquor. Lose weight, if needed. Being overweight increases your risk of certain health conditions. These include heart disease, high blood pressure, type 2 diabetes, and certain types of cancer. Protect your skin. Do not sunbathe or use tanning beds. Use sunscreen with a SPF 15 or higher. Apply sunscreen at least 15 minutes before you go outside. Reapply sunscreen every 2 hours. Wear protective clothing, hats, and sunglasses when you are outside. Drive safely. Always wear your seatbelt. Make sure everyone in your car wears a seatbelt. A seatbelt can save your life if you are in an accident. Do not use your cell phone when you are driving. This could distract you and cause an accident. Pull over if you need to make a call or send a text message. Practice safe sex. Use latex condoms if are sexually active and have more than one partner. Your healthcare provider may recommend screening tests for sexually transmitted infections (STIs). Wear helmets, lifejackets, and protective gear. Always wear a helmet when you ride a bike or motorcycle, go skiing, or play sports that could cause a head injury. Wear protective equipment when you play sports. Wear a lifejacket when you are on a boat or doing water sports.     © Copyright Nadine Ronny 2023 Information is for End User's use only and may not be sold, redistributed or otherwise used for commercial purposes. The above information is an  only. It is not intended as medical advice for individual conditions or treatments. Talk to your doctor, nurse or pharmacist before following any medical regimen to see if it is safe and effective for you. Wellness Visit for Adults   AMBULATORY CARE:   A wellness visit  is when you see your healthcare provider to get screened for health problems. Your healthcare provider will also give you advice on how to stay healthy. Write down your questions so you remember to ask them. Ask your healthcare provider how often you should have a wellness visit. What happens at a wellness visit:  Your healthcare provider will ask about your health, and your family history of health problems. This includes high blood pressure, heart disease, and cancer. He or she will ask if you have symptoms that concern you, if you smoke, and about your mood. You may also be asked about your intake of medicines, supplements, food, and alcohol. Any of the following may be done: Your weight  will be checked. Your height may also be checked so your body mass index (BMI) can be calculated. Your BMI shows if you are at a healthy weight. Your blood pressure  and heart rate will be checked. Your temperature may also be checked. Blood and urine tests  may be done. Blood tests may be done to check your cholesterol levels. Abnormal cholesterol levels increase your risk for heart disease and stroke. You may also need a blood or urine test to check for diabetes if you are at increased risk. Urine tests may be done to look for signs of an infection or kidney disease. A physical exam  includes checking your heartbeat and lungs with a stethoscope. Your healthcare provider may also check your skin to look for sun damage. Screening tests  may be recommended. A screening test is done to check for diseases that may not cause symptoms.  The screening tests you may need depend on your age, gender, family history, and lifestyle habits. For example, colorectal screening may be recommended if you are 48years old or older. Screening tests you need if you are a woman:   A Pap smear  is used to screen for cervical cancer. Pap smears are usually done every 3 to 5 years depending on your age. You may need them more often if you have had abnormal Pap smear test results in the past. Ask your healthcare provider how often you should have a Pap smear. A mammogram  is an x-ray of your breasts to screen for breast cancer. Experts recommend mammograms every 2 years starting at age 48 years. You may need a mammogram at age 52 years or younger if you have an increased risk for breast cancer. Talk to your healthcare provider about when you should start having mammograms and how often you need them. Vaccines you may need:   Get an influenza vaccine  every year. The influenza vaccine protects you from the flu. Several types of viruses cause the flu. The viruses change over time, so new vaccines are made each year. Get a tetanus-diphtheria (Td) booster vaccine  every 10 years. This vaccine protects you against tetanus and diphtheria. Tetanus is a severe infection that may cause painful muscle spasms and lockjaw. Diphtheria is a severe bacterial infection that causes a thick covering in the back of your mouth and throat. Get a human papillomavirus (HPV) vaccine  if you are female and aged 23 to 32 or male 23 to 24 and never received it. This vaccine protects you from HPV infection. HPV is the most common infection spread by sexual contact. HPV may also cause vaginal, penile, and anal cancers. Get a pneumococcal vaccine  if you are aged 72 years or older. The pneumococcal vaccine is an injection given to protect you from pneumococcal disease. Pneumococcal disease is an infection caused by pneumococcal bacteria. The infection may cause pneumonia, meningitis, or an ear infection.     Get a shingles vaccine  if you are 60 or older, even if you have had shingles before. The shingles vaccine is an injection to protect you from the varicella-zoster virus. This is the same virus that causes chickenpox. Shingles is a painful rash that develops in people who had chickenpox or have been exposed to the virus. How to eat healthy:  My Plate is a model for planning healthy meals. It shows the types and amounts of foods that should go on your plate. Fruits and vegetables make up about half of your plate, and grains and protein make up the other half. A serving of dairy is included on the side of your plate. The amount of calories and serving sizes you need depends on your age, gender, weight, and height. Examples of healthy foods are listed below:  Eat a variety of vegetables  such as dark green, red, and orange vegetables. You can also include canned vegetables low in sodium (salt) and frozen vegetables without added butter or sauces. Eat a variety of fresh fruits , canned fruit in 100% juice, frozen fruit, and dried fruit. Include whole grains. At least half of the grains you eat should be whole grains. Examples include whole-wheat bread, wheat pasta, brown rice, and whole-grain cereals such as oatmeal.    Eat a variety of protein foods such as seafood (fish and shellfish), lean meat, and poultry without skin (turkey and chicken). Examples of lean meats include pork leg, shoulder, or tenderloin, and beef round, sirloin, tenderloin, and extra lean ground beef. Other protein foods include eggs and egg substitutes, beans, peas, soy products, nuts, and seeds. Choose low-fat dairy products such as skim or 1% milk or low-fat yogurt, cheese, and cottage cheese. Limit unhealthy fats  such as butter, hard margarine, and shortening. Exercise:  Exercise at least 30 minutes per day on most days of the week. Some examples of exercise include walking, biking, dancing, and swimming.  You can also fit in more physical activity by taking the stairs instead of the elevator or parking farther away from stores. Include muscle strengthening activities 2 days each week. Regular exercise provides many health benefits. It helps you manage your weight, and decreases your risk for type 2 diabetes, heart disease, stroke, and high blood pressure. Exercise can also help improve your mood. Ask your healthcare provider about the best exercise plan for you. General health and safety guidelines:   Do not smoke. Nicotine and other chemicals in cigarettes and cigars can cause lung damage. Ask your healthcare provider for information if you currently smoke and need help to quit. E-cigarettes or smokeless tobacco still contain nicotine. Talk to your healthcare provider before you use these products. Limit alcohol. A drink of alcohol is 12 ounces of beer, 5 ounces of wine, or 1½ ounces of liquor. Lose weight, if needed. Being overweight increases your risk of certain health conditions. These include heart disease, high blood pressure, type 2 diabetes, and certain types of cancer. Protect your skin. Do not sunbathe or use tanning beds. Use sunscreen with a SPF 15 or higher. Apply sunscreen at least 15 minutes before you go outside. Reapply sunscreen every 2 hours. Wear protective clothing, hats, and sunglasses when you are outside. Drive safely. Always wear your seatbelt. Make sure everyone in your car wears a seatbelt. A seatbelt can save your life if you are in an accident. Do not use your cell phone when you are driving. This could distract you and cause an accident. Pull over if you need to make a call or send a text message. Practice safe sex. Use latex condoms if are sexually active and have more than one partner. Your healthcare provider may recommend screening tests for sexually transmitted infections (STIs). Wear helmets, lifejackets, and protective gear.   Always wear a helmet when you ride a bike or motorcycle, go skiing, or play sports that could cause a head injury. Wear protective equipment when you play sports. Wear a lifejacket when you are on a boat or doing water sports. © Copyright Loletta Gosselin 2023 Information is for End User's use only and may not be sold, redistributed or otherwise used for commercial purposes. The above information is an  only. It is not intended as medical advice for individual conditions or treatments. Talk to your doctor, nurse or pharmacist before following any medical regimen to see if it is safe and effective for you.

## 2023-12-22 ENCOUNTER — APPOINTMENT (OUTPATIENT)
Dept: LAB | Age: 43
End: 2023-12-22
Payer: COMMERCIAL

## 2023-12-22 DIAGNOSIS — Z12.5 SCREENING PSA (PROSTATE SPECIFIC ANTIGEN): ICD-10-CM

## 2023-12-22 DIAGNOSIS — R03.0 ELEVATED BLOOD PRESSURE READING IN OFFICE WITHOUT DIAGNOSIS OF HYPERTENSION: ICD-10-CM

## 2023-12-22 DIAGNOSIS — Z11.4 ENCOUNTER FOR SCREENING FOR HIV: ICD-10-CM

## 2023-12-22 DIAGNOSIS — E66.9 OBESITY (BMI 30.0-34.9): ICD-10-CM

## 2023-12-22 DIAGNOSIS — Z13.6 SCREENING FOR CARDIOVASCULAR CONDITION: ICD-10-CM

## 2023-12-22 DIAGNOSIS — Z11.59 NEED FOR HEPATITIS C SCREENING TEST: ICD-10-CM

## 2023-12-22 LAB
ALBUMIN SERPL BCP-MCNC: 4.6 G/DL (ref 3.5–5)
ALP SERPL-CCNC: 57 U/L (ref 34–104)
ALT SERPL W P-5'-P-CCNC: 12 U/L (ref 7–52)
ANION GAP SERPL CALCULATED.3IONS-SCNC: 4 MMOL/L
AST SERPL W P-5'-P-CCNC: 17 U/L (ref 13–39)
BILIRUB SERPL-MCNC: 1.36 MG/DL (ref 0.2–1)
BUN SERPL-MCNC: 11 MG/DL (ref 5–25)
CALCIUM SERPL-MCNC: 9.2 MG/DL (ref 8.4–10.2)
CHLORIDE SERPL-SCNC: 107 MMOL/L (ref 96–108)
CHOLEST SERPL-MCNC: 120 MG/DL
CO2 SERPL-SCNC: 30 MMOL/L (ref 21–32)
CREAT SERPL-MCNC: 1.03 MG/DL (ref 0.6–1.3)
ERYTHROCYTE [DISTWIDTH] IN BLOOD BY AUTOMATED COUNT: 12.2 % (ref 11.6–15.1)
EST. AVERAGE GLUCOSE BLD GHB EST-MCNC: 103 MG/DL
GFR SERPL CREATININE-BSD FRML MDRD: 87 ML/MIN/1.73SQ M
GLUCOSE P FAST SERPL-MCNC: 102 MG/DL (ref 65–99)
HBA1C MFR BLD: 5.2 %
HCT VFR BLD AUTO: 44.1 % (ref 36.5–49.3)
HCV AB SER QL: NORMAL
HDLC SERPL-MCNC: 29 MG/DL
HGB BLD-MCNC: 15.5 G/DL (ref 12–17)
HIV 1+2 AB+HIV1 P24 AG SERPL QL IA: NORMAL
HIV 2 AB SERPL QL IA: NORMAL
HIV1 AB SERPL QL IA: NORMAL
HIV1 P24 AG SERPL QL IA: NORMAL
LDLC SERPL CALC-MCNC: 63 MG/DL (ref 0–100)
MCH RBC QN AUTO: 30.4 PG (ref 26.8–34.3)
MCHC RBC AUTO-ENTMCNC: 35.1 G/DL (ref 31.4–37.4)
MCV RBC AUTO: 87 FL (ref 82–98)
NONHDLC SERPL-MCNC: 91 MG/DL
PLATELET # BLD AUTO: 211 THOUSANDS/UL (ref 149–390)
PMV BLD AUTO: 11.1 FL (ref 8.9–12.7)
POTASSIUM SERPL-SCNC: 4 MMOL/L (ref 3.5–5.3)
PROT SERPL-MCNC: 7.7 G/DL (ref 6.4–8.4)
PSA SERPL-MCNC: 0.51 NG/ML (ref 0–4)
RBC # BLD AUTO: 5.1 MILLION/UL (ref 3.88–5.62)
SODIUM SERPL-SCNC: 141 MMOL/L (ref 135–147)
TRIGL SERPL-MCNC: 140 MG/DL
TSH SERPL DL<=0.05 MIU/L-ACNC: 1.43 UIU/ML (ref 0.45–4.5)
WBC # BLD AUTO: 4.81 THOUSAND/UL (ref 4.31–10.16)

## 2023-12-22 PROCEDURE — 80061 LIPID PANEL: CPT

## 2023-12-22 PROCEDURE — 84443 ASSAY THYROID STIM HORMONE: CPT

## 2023-12-22 PROCEDURE — 36415 COLL VENOUS BLD VENIPUNCTURE: CPT

## 2023-12-22 PROCEDURE — 80053 COMPREHEN METABOLIC PANEL: CPT

## 2023-12-22 PROCEDURE — G0103 PSA SCREENING: HCPCS

## 2023-12-22 PROCEDURE — 87389 HIV-1 AG W/HIV-1&-2 AB AG IA: CPT

## 2023-12-22 PROCEDURE — 83036 HEMOGLOBIN GLYCOSYLATED A1C: CPT

## 2023-12-22 PROCEDURE — 86803 HEPATITIS C AB TEST: CPT

## 2023-12-22 PROCEDURE — 85027 COMPLETE CBC AUTOMATED: CPT

## 2024-01-09 ENCOUNTER — OFFICE VISIT (OUTPATIENT)
Dept: FAMILY MEDICINE CLINIC | Facility: CLINIC | Age: 44
End: 2024-01-09

## 2024-01-09 VITALS
TEMPERATURE: 97.1 F | BODY MASS INDEX: 31.43 KG/M2 | DIASTOLIC BLOOD PRESSURE: 94 MMHG | HEART RATE: 88 BPM | RESPIRATION RATE: 18 BRPM | SYSTOLIC BLOOD PRESSURE: 160 MMHG | WEIGHT: 272 LBS

## 2024-01-09 DIAGNOSIS — I10 PRIMARY HYPERTENSION: Primary | ICD-10-CM

## 2024-01-09 PROCEDURE — 99213 OFFICE O/P EST LOW 20 MIN: CPT | Performed by: FAMILY MEDICINE

## 2024-01-09 NOTE — PROGRESS NOTES
Name: Raymundo Loomis      : 1980      MRN: [id number]  Encounter Provider: Christo Bowles MD  Encounter Date: 2024   Encounter department: [facility]    Assessment & Plan     1. Primary hypertension  Assessment & Plan:  Uncontrolled  Not on any medications, labs reviewed  Elevated BP at home and in the office  Patient would be a candidate for medical therapy but after shared decision-making, he declined medications and stated he wanted to try conservative measures first including diet and exercise. Instructed the patient to continue tracking his BP at home and if they remain above goal, we will start anti-hypertensives at the next visit  Counseled on nutrition including hydration, limited salt intake             Subjective      Patient presenting to review labwork and follow-up HTN. Based on his ambulatory blood pressures in the past few weeks (138/89, 140/96, 156/100, 150/96, 148/96), and pressures in the office, maurilio has HTN. We had discussed that he would be appropriate for medical therapy but after shared decision-making, the patient expressed that he would like to start conservative measurements first including diet/exercise first before resorting to medications.   Also reviewed other labs, which appear WNL.       Review of Systems   Constitutional:  Negative for chills and fever.   HENT:  Negative for ear pain and sore throat.    Eyes:  Negative for pain and visual disturbance.   Respiratory:  Negative for cough and shortness of breath.    Cardiovascular:  Negative for chest pain and palpitations.   Gastrointestinal:  Negative for abdominal pain and vomiting.   Genitourinary:  Negative for dysuria and hematuria.   Musculoskeletal:  Negative for arthralgias and back pain.   Skin:  Negative for color change and rash.   Neurological:  Negative for seizures and syncope.   All other systems reviewed and are negative.          Current Outpatient Medications on File  Prior to Visit   Medication Sig    Multiple Vitamins-Minerals (MULTIVITAMIN ADULT PO) Take by mouth       Objective     /94   Pulse 88   Temp (!) 97.1 °F (36.2 °C)   Resp 18   Wt 123 kg (272 lb)   BMI 31.43 kg/m²     Physical Exam  Vitals reviewed.   Constitutional:       General: He is not in acute distress.     Appearance: Normal appearance.   HENT:      Head: Normocephalic and atraumatic.      Nose: Nose normal.   Eyes:      Extraocular Movements: Extraocular movements intact.      Conjunctiva/sclera: Conjunctivae normal.   Cardiovascular:      Rate and Rhythm: Normal rate and regular rhythm.      Heart sounds: No murmur heard.  Pulmonary:      Effort: Pulmonary effort is normal. No respiratory distress.      Breath sounds: Normal breath sounds.   Abdominal:      General: Abdomen is flat. Bowel sounds are normal.      Palpations: Abdomen is soft.      Tenderness: There is no abdominal tenderness.   Musculoskeletal:         General: No tenderness. Normal range of motion.      Cervical back: Normal range of motion and neck supple.   Neurological:      Mental Status: He is alert and oriented to person, place, and time.   Psychiatric:         Mood and Affect: Mood normal.         Behavior: Behavior normal.       Christo Bowles MD

## 2024-01-09 NOTE — ASSESSMENT & PLAN NOTE
Uncontrolled  Not on any medications, labs reviewed  Elevated BP at home and in the office  Patient would be a candidate for medical therapy but after shared decision-making, he declined medications and stated he wanted to try conservative measures first including diet and exercise. Instructed the patient to continue tracking his BP at home and if they remain above goal, we will start anti-hypertensives at the next visit  Counseled on nutrition including hydration, limited salt intake

## 2024-01-23 ENCOUNTER — TELEPHONE (OUTPATIENT)
Dept: PSYCHIATRY | Facility: CLINIC | Age: 44
End: 2024-01-23

## 2024-01-23 NOTE — TELEPHONE ENCOUNTER
Patient has been added to the [facility] wait list without a referral.    Insurance: [employer]  Insurance Type:    Commercial [x]   Medicaid []   [facility] (if applicable)   Medicare []  Location Preference: [place]  Provider Preference: no prov pref  Virtual: Yes [] No [x]  Were outside resources sent: Yes [] No [x]

## 2024-05-16 ENCOUNTER — OFFICE VISIT (OUTPATIENT)
Dept: FAMILY MEDICINE CLINIC | Facility: CLINIC | Age: 44
End: 2024-05-16

## 2024-05-16 VITALS
DIASTOLIC BLOOD PRESSURE: 100 MMHG | RESPIRATION RATE: 18 BRPM | SYSTOLIC BLOOD PRESSURE: 158 MMHG | OXYGEN SATURATION: 100 % | TEMPERATURE: 97.7 F | BODY MASS INDEX: 29.84 KG/M2 | WEIGHT: 258.2 LBS | HEART RATE: 80 BPM

## 2024-05-16 DIAGNOSIS — Z09 S/P UMBILICAL HERNIA REPAIR, FOLLOW-UP EXAM: ICD-10-CM

## 2024-05-16 DIAGNOSIS — I10 PRIMARY HYPERTENSION: Primary | ICD-10-CM

## 2024-05-16 PROCEDURE — 99213 OFFICE O/P EST LOW 20 MIN: CPT | Performed by: FAMILY MEDICINE

## 2024-05-16 RX ORDER — LISINOPRIL 10 MG/1
10 TABLET ORAL DAILY
Qty: 90 TABLET | Refills: 1 | Status: SHIPPED | OUTPATIENT
Start: 2024-05-16

## 2024-05-16 NOTE — ASSESSMENT & PLAN NOTE
S/p umbilical hernia repair back in 2017  Has had recurrent peeling, scabbing of the repair site, but noticed sutures popping out 2 weeks ago.   Denies any pain or recurrent hernia, but continues to notice sutures appearing  Will send referral to general surgery

## 2024-05-16 NOTE — ASSESSMENT & PLAN NOTE
Uncontrolled  Elevated to 150s systolic at home  Will start lisinopril 10mg daily  Follow-up in 4 weeks with BP log

## 2024-05-16 NOTE — PROGRESS NOTES
Ambulatory Visit  Name: Raymundo Loomis      : 1980      MRN: [id number]  Encounter Provider: Christo Bowles MD  Encounter Date: 2024   Encounter department: [facility]    Assessment & Plan   1. Primary hypertension  Assessment & Plan:  Uncontrolled  Elevated to 150s systolic at home  Will start lisinopril 10mg daily  Follow-up in 4 weeks with BP log  Orders:  -     lisinopril (ZESTRIL) 10 mg tablet; Take 1 tablet (10 mg total) by mouth daily  2. S/P umbilical hernia repair, follow-up exam  Assessment & Plan:  S/p umbilical hernia repair back in   Has had recurrent peeling, scabbing of the repair site, but noticed sutures popping out 2 weeks ago.   Denies any pain or recurrent hernia, but continues to notice sutures appearing  Will send referral to general surgery   Orders:  -     Ambulatory Referral to General Surgery; Future       History of Present Illness     Patient presenting for follow-up of his hypertension but also to discuss his hernia repair.  Patient's blood pressures at home have been elevated to the 150 systolic and he is now okay with starting medications.   He had an umbilical hernia repair back in  along with a lap marli (converted to open cholecystectomy).  He had previously mentioned that the hernia site is often peeling, scabbing persistently.  Otherwise, he denies any pain or reappearance of the hernia even with Valsalva or intra-abdominal pressure.  Two weeks ago though, he noticed sutures peeking out and he clipped those with a pair scissors.  He continues to see fragments of sutures poking out from the hernia site.  Otherwise, there is bleeding when the site peels but no purulent drainage.    Hypertension  Pertinent negatives include no chest pain, palpitations or shortness of breath.       Review of Systems   Constitutional:  Negative for chills and fever.   HENT:  Negative for ear pain and sore throat.    Eyes:  Negative for pain and  visual disturbance.   Respiratory:  Negative for cough and shortness of breath.    Cardiovascular:  Negative for chest pain and palpitations.   Gastrointestinal:  Negative for abdominal pain and vomiting.   Genitourinary:  Negative for dysuria and hematuria.   Musculoskeletal:  Negative for arthralgias and back pain.   Skin:  Positive for wound. Negative for color change and rash.   All other systems reviewed and are negative.      Objective     /100   Pulse 80   Temp 97.7 °F (36.5 °C)   Resp 18   Wt 117 kg (258 lb 3.2 oz)   SpO2 100%   BMI 29.84 kg/m²     Physical Exam  Vitals reviewed.   Constitutional:       General: He is not in acute distress.     Appearance: Normal appearance. He is well-developed. He is not ill-appearing.   HENT:      Head: Normocephalic and atraumatic.   Eyes:      Extraocular Movements: Extraocular movements intact.      Conjunctiva/sclera: Conjunctivae normal.   Abdominal:      General: Abdomen is flat. There is no distension.      Palpations: Abdomen is soft.      Tenderness: There is no abdominal tenderness.      Comments: S/p umbilical hernia repair  No hernia with Valsalva but the site is scabbing/peeling. Some of the sutures are visibly poking out. No tenderness to palpation   Musculoskeletal:         General: No swelling.      Cervical back: Neck supple.   Skin:     General: Skin is warm and dry.      Capillary Refill: Capillary refill takes less than 2 seconds.   Neurological:      Mental Status: He is alert.   Psychiatric:         Mood and Affect: Mood normal.           Administrative Statements

## 2024-06-20 ENCOUNTER — OFFICE VISIT (OUTPATIENT)
Dept: FAMILY MEDICINE CLINIC | Facility: CLINIC | Age: 44
End: 2024-06-20

## 2024-06-20 VITALS
WEIGHT: 261 LBS | SYSTOLIC BLOOD PRESSURE: 160 MMHG | HEIGHT: 78 IN | OXYGEN SATURATION: 98 % | TEMPERATURE: 98.1 F | RESPIRATION RATE: 18 BRPM | HEART RATE: 70 BPM | BODY MASS INDEX: 30.2 KG/M2 | DIASTOLIC BLOOD PRESSURE: 86 MMHG

## 2024-06-20 DIAGNOSIS — Z09 S/P UMBILICAL HERNIA REPAIR, FOLLOW-UP EXAM: ICD-10-CM

## 2024-06-20 DIAGNOSIS — I10 PRIMARY HYPERTENSION: Primary | ICD-10-CM

## 2024-06-20 PROCEDURE — 99213 OFFICE O/P EST LOW 20 MIN: CPT | Performed by: FAMILY MEDICINE

## 2024-06-20 RX ORDER — LISINOPRIL 20 MG/1
20 TABLET ORAL DAILY
Qty: 30 TABLET | Refills: 5 | Status: SHIPPED | OUTPATIENT
Start: 2024-06-20

## 2024-06-20 NOTE — PROGRESS NOTES
"Ambulatory Visit  Name: Raymundo Loomis      : 1980      MRN: [id number]  Encounter Provider: Christo Bowles MD  Encounter Date: 2024   Encounter department: [facility]    Assessment & Plan   1. Primary hypertension  Assessment & Plan:  Uncontrolled  Elevated to 140-150s systolic at home  Will increase to Lisinopril 20mg daily   Follow-up in 2-3 months   Orders:  -     lisinopril (ZESTRIL) 20 mg tablet; Take 1 tablet (20 mg total) by mouth daily  2. S/P umbilical hernia repair, follow-up exam  Assessment & Plan:  S/p umbilical hernia repair back in 2017  Has had recurrent peeling, scabbing of the repair site, denies pain currently but sutures sometimes pop out  Referral to gen surgery provided last visit, pt needs to call back office        History of Present Illness     Patient has been compliant with lisinopril but blood pressures at home still remain high at 140-150 systolic.  Denies any symptoms.  Also received calls from the general surgery office for his umbilical hernia repair, but patient needs to call them back.    Hypertension  Pertinent negatives include no chest pain, palpitations or shortness of breath.       Review of Systems   Constitutional:  Negative for chills and fever.   HENT:  Negative for ear pain and sore throat.    Eyes:  Negative for pain and visual disturbance.   Respiratory:  Negative for cough and shortness of breath.    Cardiovascular:  Negative for chest pain and palpitations.   Gastrointestinal:  Negative for abdominal pain and vomiting.   Genitourinary:  Negative for dysuria and hematuria.   Musculoskeletal:  Negative for arthralgias and back pain.   Skin:  Negative for color change and rash.        S/p umbilical hernia repair    All other systems reviewed and are negative.      Objective     /86   Pulse 70   Temp 98.1 °F (36.7 °C) (Temporal)   Resp 18   Ht 6' 6\" (1.981 m)   Wt 118 kg (261 lb)   SpO2 98%   BMI 30.16 kg/m² "     Physical Exam  Vitals reviewed.   Constitutional:       General: He is not in acute distress.     Appearance: Normal appearance. He is well-developed.   HENT:      Head: Normocephalic and atraumatic.   Eyes:      Extraocular Movements: Extraocular movements intact.      Conjunctiva/sclera: Conjunctivae normal.   Cardiovascular:      Rate and Rhythm: Normal rate and regular rhythm.      Heart sounds: Normal heart sounds. No murmur heard.  Pulmonary:      Effort: Pulmonary effort is normal. No respiratory distress.      Breath sounds: Normal breath sounds.   Abdominal:      General: Abdomen is flat. Bowel sounds are normal.      Palpations: Abdomen is soft.      Tenderness: There is no abdominal tenderness.   Neurological:      Mental Status: He is alert.   Psychiatric:         Mood and Affect: Mood normal.       Administrative Statements

## 2024-06-20 NOTE — ASSESSMENT & PLAN NOTE
S/p umbilical hernia repair back in 2017  Has had recurrent peeling, scabbing of the repair site, denies pain currently but sutures sometimes pop out  Referral to gen surgery provided last visit, pt needs to call back office

## 2024-06-20 NOTE — ASSESSMENT & PLAN NOTE
Uncontrolled  Elevated to 140-150s systolic at home  Will increase to Lisinopril 20mg daily   Follow-up in 2-3 months

## 2024-07-10 ENCOUNTER — TELEPHONE (OUTPATIENT)
Dept: FAMILY MEDICINE CLINIC | Facility: CLINIC | Age: 44
End: 2024-07-10

## 2024-07-10 NOTE — TELEPHONE ENCOUNTER
To be completed by:  Dr. Bonner    Form: Physical     Folder:  RED      To be picked up by patient when complete. Please call [phone]

## 2024-07-12 ENCOUNTER — OFFICE VISIT (OUTPATIENT)
Dept: FAMILY MEDICINE CLINIC | Facility: CLINIC | Age: 44
End: 2024-07-12

## 2024-07-12 VITALS
OXYGEN SATURATION: 98 % | WEIGHT: 261.2 LBS | DIASTOLIC BLOOD PRESSURE: 98 MMHG | BODY MASS INDEX: 30.22 KG/M2 | HEIGHT: 78 IN | TEMPERATURE: 97.3 F | HEART RATE: 72 BPM | SYSTOLIC BLOOD PRESSURE: 150 MMHG | RESPIRATION RATE: 18 BRPM

## 2024-07-12 DIAGNOSIS — I10 PRIMARY HYPERTENSION: Primary | ICD-10-CM

## 2024-07-12 RX ORDER — LISINOPRIL 20 MG/1
30 TABLET ORAL DAILY
Qty: 30 TABLET | Refills: 0 | Status: SHIPPED | OUTPATIENT
Start: 2024-07-12

## 2024-07-12 NOTE — PROGRESS NOTES
Ambulatory Visit  Name: Raymundo Loomis      : 1980      MRN: [id number]  Encounter Provider: Cezar Ortiz DO  Encounter Date: 2024   Encounter department: [facility]    Assessment & Plan   1. Primary hypertension  Assessment & Plan:  Patient is on lisinopril 20 mg daily without side effect  Patient's blood pressure is still uncontrolled  - Most recent BP Blood Pressure: 150/98      Plan:  -Increase lisinopril to 30 mg daily  -Follow-up in 1 week for blood pressure check    (a) ADD 20min or ~2000 steps of walking per DAY from what you are currently doing (most smartphones have a step counter).   a) LIMIT to 1500mg sodium per day (3/4 teaspoon table salt per day = 1.5 [employer] spring bottle caps)  (a) recommend weight loss (exercise + diet plan as above)  Orders:  -     lisinopril (ZESTRIL) 20 mg tablet; Take 1.5 tablets (30 mg total) by mouth daily    BMI Counseling: Body mass index is 30.18 kg/m². The BMI is above normal. Nutrition recommendations include decreasing portion sizes, decreasing fast food intake and consuming healthier snacks. Exercise recommendations include moderate physical activity 150 minutes/week. No pharmacotherapy was ordered. Rationale for BMI follow-up plan is due to patient being overweight or obese.       History of Present Illness     Patient is a 44-year-old male with past medical history significant for hypertension on lisinopril 20 mg who presents to the clinic today to fill out a form for [employer].  Patient states that he feels well overall and has no complaints.  On arrival his blood pressure was elevated to 150s/100s and on recheck 150/98.  He has no symptoms associate with elevated blood pressures.  At home he usually gets 140s/90s.  Also reports not working out as much and is starting to work on that.        Review of Systems   Constitutional:  Negative for activity change, fatigue, fever and unexpected weight change.   HENT:  " Negative for congestion.    Eyes:  Negative for visual disturbance.   Respiratory:  Negative for cough, shortness of breath and wheezing.    Cardiovascular:  Negative for chest pain, palpitations and leg swelling.   Gastrointestinal:  Negative for abdominal pain, constipation and diarrhea.   Endocrine: Negative for cold intolerance, polydipsia and polyuria.   Genitourinary:  Negative for difficulty urinating and dysuria.   Musculoskeletal:  Negative for arthralgias.   Neurological:  Negative for dizziness.   Psychiatric/Behavioral:  Negative for dysphoric mood and suicidal ideas.        Objective     /98   Pulse 72   Temp (!) 97.3 °F (36.3 °C) (Temporal)   Resp 18   Ht 6' 6\" (1.981 m)   Wt 118 kg (261 lb 3.2 oz)   SpO2 98%   BMI 30.18 kg/m²     Physical Exam  Vitals and nursing note reviewed.   Constitutional:       General: He is not in acute distress.     Appearance: Normal appearance. He is well-developed.   HENT:      Head: Normocephalic and atraumatic.      Right Ear: External ear normal.      Left Ear: External ear normal.      Nose: Nose normal.      Mouth/Throat:      Mouth: Mucous membranes are moist.      Pharynx: Oropharynx is clear.   Eyes:      Extraocular Movements: Extraocular movements intact.      Conjunctiva/sclera: Conjunctivae normal.      Pupils: Pupils are equal, round, and reactive to light.   Cardiovascular:      Rate and Rhythm: Normal rate and regular rhythm.      Heart sounds: Normal heart sounds. No murmur heard.  Pulmonary:      Effort: Pulmonary effort is normal. No respiratory distress.      Breath sounds: Normal breath sounds.   Abdominal:      General: Abdomen is flat. Bowel sounds are normal. There is no distension.      Palpations: Abdomen is soft.      Tenderness: There is no abdominal tenderness.   Musculoskeletal:         General: No swelling. Normal range of motion.      Cervical back: Normal range of motion and neck supple.      Right lower leg: No edema.      " Left lower leg: No edema.   Skin:     General: Skin is warm and dry.      Capillary Refill: Capillary refill takes less than 2 seconds.   Neurological:      Mental Status: He is alert and oriented to person, place, and time. Mental status is at baseline.   Psychiatric:         Mood and Affect: Mood normal.         Behavior: Behavior normal.       Administrative Statements

## 2024-07-12 NOTE — ASSESSMENT & PLAN NOTE
Patient is on lisinopril 20 mg daily without side effect  Patient's blood pressure is still uncontrolled  - Most recent BP Blood Pressure: 150/98      Plan:  -Increase lisinopril to 30 mg daily  -Follow-up in 1 week for blood pressure check    (a) ADD 20min or ~2000 steps of walking per DAY from what you are currently doing (most smartphones have a step counter).   a) LIMIT to 1500mg sodium per day (3/4 teaspoon table salt per day = 1.5 leo spring bottle caps)  (a) recommend weight loss (exercise + diet plan as above)

## 2024-07-12 NOTE — PATIENT INSTRUCTIONS
Lifestyle Modification Goals for NEXT Visit:      Aerobic Exercise  (a) ADD 20min or ~2000 steps of walking per DAY from what you are currently doing (most smartphones have a step counter).   Keep a log below!        Sodium Restriction  (a) LIMIT to 1500mg sodium per day (3/4 teaspoon table salt per day = 1.5 [employer] spring bottle caps)  TIP: deli meats, canned foods have high salt content  Keep a log below!    Other  (a) recommend weight loss (exercise + diet plan as above)    ...by  Sept/12/2024 (approximate date of next visit)      PROGRESS MADE THUS FAR:  STARTED: (a) ADD 20min or ~2000 steps of walking per DAY from what you are currently doing (most smartphones have a step counter).   STILL WORKING ON: (a) LIMIT to 1500mg sodium per day (3/4 teaspoon table salt per day = 1.5 leo spring bottle caps)  STILL WORKING ON: (a) recommend weight loss (exercise + diet plan as above)          - - - - - - - - - - - - - - - - - - - - - - - - -     Aerobic Exercise LOG      GOAL:      GOAL DATE:         Sunday Monday Tuesday Wednesday Thursday Friday Saturday                                                                                           Example: Week 1 added 1000 steps -> Week 2 added 2000 steps      - - - - - - - - - - - - - - - - - - - - - - - - -     DASH Diet & Salt Restriction LOG   GOAL:      GOAL DATE:         Sunday Monday Tuesday Wednesday Thursday Friday Saturday                                                                                           Example: Week 1: decreased to 1 can of soda every other day -> Week 2: decreased to 1 can every 2 days

## 2024-07-18 ENCOUNTER — OFFICE VISIT (OUTPATIENT)
Dept: FAMILY MEDICINE CLINIC | Facility: CLINIC | Age: 44
End: 2024-07-18

## 2024-07-18 VITALS
DIASTOLIC BLOOD PRESSURE: 95 MMHG | HEART RATE: 75 BPM | OXYGEN SATURATION: 93 % | SYSTOLIC BLOOD PRESSURE: 142 MMHG | RESPIRATION RATE: 18 BRPM | TEMPERATURE: 97.2 F

## 2024-07-18 DIAGNOSIS — I10 PRIMARY HYPERTENSION: Primary | ICD-10-CM

## 2024-07-18 PROCEDURE — 99213 OFFICE O/P EST LOW 20 MIN: CPT | Performed by: FAMILY MEDICINE

## 2024-07-18 NOTE — ASSESSMENT & PLAN NOTE
- Patient previously on lisinopril 20 mg however noted to not have blood pressure at goal given this patient's lisinopril dosage was increased to 30 mg daily  - Pressure on exam today 142/95; per patient repots that BP at home have been brw 110/70 -130's/80's.   - Denies any SE from the medication. Reports he is doing well overall    Plan:  - C/w lisinopril to 30 mg daily  - Follow-up in 4 week for blood pressure   - Instructed healthy lifestyle modification  - CMP ordered   - C/w Sleep medicine evaluation for LUCIO

## 2024-07-18 NOTE — PROGRESS NOTES
Ambulatory Visit  Name: Raymundo Loomis      : 1980      MRN: [id number]  Encounter Provider: Akua Keita MD  Encounter Date: 2024   Encounter department: [facility]    Assessment & Plan   1. Primary hypertension  Assessment & Plan:  - Patient previously on lisinopril 20 mg however noted to not have blood pressure at goal given this patient's lisinopril dosage was increased to 30 mg daily  - Pressure on exam today 142/95; per patient repots that BP at home have been brw 110/70 -130's/80's.   - Denies any SE from the medication. Reports he is doing well overall    Plan:  - C/w lisinopril to 30 mg daily  - Follow-up in 4 week for blood pressure   - Instructed healthy lifestyle modification  - CMP ordered   - C/w Sleep medicine evaluation for LUCIO     Orders:  -     Comprehensive metabolic panel; Future       History of Present Illness     Patient here for BP follow-up. Overall patient is doing well. Reports tolerating medication well, denies any SE. States BP at home have been wnls.         Review of Systems   Constitutional:  Negative for chills and fever.   HENT:  Negative for ear pain and sore throat.    Eyes:  Negative for pain and visual disturbance.   Respiratory:  Negative for cough and shortness of breath.    Cardiovascular:  Negative for chest pain and palpitations.   Gastrointestinal:  Negative for abdominal pain and vomiting.   Genitourinary:  Negative for dysuria and hematuria.   Musculoskeletal:  Negative for arthralgias and back pain.   Skin:  Negative for color change and rash.   Neurological:  Negative for seizures, syncope and headaches.   All other systems reviewed and are negative.    Current Outpatient Medications on File Prior to Visit   Medication Sig Dispense Refill    lisinopril (ZESTRIL) 20 mg tablet Take 1.5 tablets (30 mg total) by mouth daily 30 tablet 0    Multiple Vitamins-Minerals (MULTIVITAMIN ADULT PO) Take by mouth       No  current facility-administered medications on file prior to visit.      Social History     Tobacco Use    Smoking status: Never    Smokeless tobacco: Never    Tobacco comments:     Per [employer]: Former smoker   Vaping Use    Vaping status: Never Used   Substance and Sexual Activity    Alcohol use: Yes     Comment: few a month/social    Drug use: Never    Sexual activity: Yes     Partners: Female     Birth control/protection: Condom Male     Objective     /95   Pulse 75   Temp (!) 97.2 °F (36.2 °C)   Resp 18   SpO2 93%     Physical Exam  Vitals and nursing note reviewed.   Constitutional:       General: He is not in acute distress.     Appearance: He is well-developed.   HENT:      Head: Normocephalic and atraumatic.   Eyes:      Conjunctiva/sclera: Conjunctivae normal.   Cardiovascular:      Rate and Rhythm: Normal rate and regular rhythm.      Heart sounds: No murmur heard.  Pulmonary:      Effort: Pulmonary effort is normal. No respiratory distress.      Breath sounds: Normal breath sounds.   Abdominal:      Palpations: Abdomen is soft.      Tenderness: There is no abdominal tenderness.   Musculoskeletal:         General: No swelling.   Skin:     General: Skin is warm and dry.      Capillary Refill: Capillary refill takes less than 2 seconds.   Neurological:      Mental Status: He is alert.   Psychiatric:         Mood and Affect: Mood normal.       Administrative Statements   I have spent a total time of 15 minutes in caring for this patient on the day of the visit/encounter including Diagnostic results, Risks and benefits of tx options, Instructions for management, Patient and family education, Importance of tx compliance, Risk factor reductions, Impressions, Counseling / Coordination of care, Documenting in the medical record, and Obtaining or reviewing history  .

## 2024-08-13 ENCOUNTER — OFFICE VISIT (OUTPATIENT)
Dept: FAMILY MEDICINE CLINIC | Facility: CLINIC | Age: 44
End: 2024-08-13

## 2024-08-13 VITALS
HEART RATE: 90 BPM | WEIGHT: 259 LBS | OXYGEN SATURATION: 99 % | RESPIRATION RATE: 20 BRPM | DIASTOLIC BLOOD PRESSURE: 79 MMHG | TEMPERATURE: 98 F | SYSTOLIC BLOOD PRESSURE: 146 MMHG | HEIGHT: 78 IN | BODY MASS INDEX: 29.97 KG/M2

## 2024-08-13 DIAGNOSIS — I10 PRIMARY HYPERTENSION: ICD-10-CM

## 2024-08-13 PROCEDURE — 99213 OFFICE O/P EST LOW 20 MIN: CPT | Performed by: FAMILY MEDICINE

## 2024-08-13 RX ORDER — LISINOPRIL 20 MG/1
30 TABLET ORAL DAILY
Qty: 30 TABLET | Refills: 3 | Status: SHIPPED | OUTPATIENT
Start: 2024-08-13 | End: 2024-08-14 | Stop reason: CLARIF

## 2024-08-13 NOTE — PROGRESS NOTES
"Ambulatory Visit  Name: Raymundo Loomis      : 1980      MRN: [id number]  Encounter Provider: Cezar Ortiz DO  Encounter Date: 2024   Encounter department: [facility]    Assessment & Plan   1. Primary hypertension  Assessment & Plan:  - Current regimen: Lisinopril 30mg   - Per patient reports that home BPs 110s/70s -130s/80s.   - Most recent BP Blood Pressure: 146/79      Plan:  - Continue lisinopril to 30 mg daily  - Follow-up in October  - Instructed healthy lifestyle modification  - Sleep medicine evaluation for LUCIO scheduled for     Orders:  -     lisinopril (ZESTRIL) 20 mg tablet; Take 1.5 tablets (30 mg total) by mouth daily       History of Present Illness     Presents for BP check.  Patient remains on 30 mg lisinopril daily, and states that he is compliant on it.  His pressures at home have been in the 130s/70s.  He denies any low blood pressures, headaches, dizziness, vision changes, chest pain.        Review of Systems   Constitutional:  Negative for chills, fatigue and fever.   HENT:  Negative for ear pain and sore throat.    Eyes:  Negative for pain and visual disturbance.   Respiratory:  Negative for cough, shortness of breath and wheezing.    Cardiovascular:  Negative for chest pain and palpitations.   Gastrointestinal:  Negative for abdominal pain, diarrhea, nausea and vomiting.   Genitourinary:  Negative for dysuria and hematuria.   Musculoskeletal:  Negative for arthralgias and back pain.   Skin:  Negative for color change and rash.   Neurological:  Negative for dizziness, seizures, syncope, weakness, light-headedness and headaches.   All other systems reviewed and are negative.      Objective     /79   Pulse 90   Temp 98 °F (36.7 °C) (Temporal)   Resp 20   Ht 6' 6\" (1.981 m)   Wt 117 kg (259 lb)   SpO2 99%   BMI 29.93 kg/m²     Physical Exam  Vitals and nursing note reviewed.   Constitutional:       General: He is not in acute " distress.     Appearance: Normal appearance. He is well-developed.   HENT:      Head: Normocephalic and atraumatic.      Right Ear: External ear normal.      Left Ear: External ear normal.      Nose: Nose normal.      Mouth/Throat:      Mouth: Mucous membranes are moist.      Pharynx: Oropharynx is clear.   Eyes:      Extraocular Movements: Extraocular movements intact.      Conjunctiva/sclera: Conjunctivae normal.   Cardiovascular:      Rate and Rhythm: Normal rate and regular rhythm.      Heart sounds: Normal heart sounds. No murmur heard.  Pulmonary:      Effort: Pulmonary effort is normal. No respiratory distress.      Breath sounds: Normal breath sounds.   Abdominal:      General: Abdomen is flat. Bowel sounds are normal.      Palpations: Abdomen is soft.      Tenderness: There is no abdominal tenderness.   Musculoskeletal:         General: No swelling.      Right lower leg: No edema.      Left lower leg: No edema.   Skin:     General: Skin is warm and dry.      Capillary Refill: Capillary refill takes less than 2 seconds.   Neurological:      General: No focal deficit present.      Mental Status: He is alert and oriented to person, place, and time.   Psychiatric:         Mood and Affect: Mood normal.         Behavior: Behavior normal.       Administrative Statements

## 2024-08-13 NOTE — ASSESSMENT & PLAN NOTE
- Current regimen: Lisinopril 30mg   - Per patient reports that home BPs 110s/70s -130s/80s.   - Most recent BP Blood Pressure: 146/79      Plan:  - Continue lisinopril to 30 mg daily  - Follow-up in October  - Instructed healthy lifestyle modification  - Sleep medicine evaluation for LUCIO scheduled for 8/14

## 2024-08-14 ENCOUNTER — OFFICE VISIT (OUTPATIENT)
Dept: SLEEP CENTER | Facility: CLINIC | Age: 44
End: 2024-08-14
Payer: COMMERCIAL

## 2024-08-14 VITALS
HEART RATE: 85 BPM | SYSTOLIC BLOOD PRESSURE: 140 MMHG | DIASTOLIC BLOOD PRESSURE: 100 MMHG | BODY MASS INDEX: 29.85 KG/M2 | OXYGEN SATURATION: 99 % | WEIGHT: 258 LBS | HEIGHT: 78 IN

## 2024-08-14 DIAGNOSIS — G47.19 EXCESSIVE DAYTIME SLEEPINESS: ICD-10-CM

## 2024-08-14 DIAGNOSIS — I10 PRIMARY HYPERTENSION: ICD-10-CM

## 2024-08-14 DIAGNOSIS — R06.81 WITNESSED APNEIC SPELLS: Primary | ICD-10-CM

## 2024-08-14 DIAGNOSIS — R06.83 SNORING: ICD-10-CM

## 2024-08-14 DIAGNOSIS — I10 PRIMARY HYPERTENSION: Primary | ICD-10-CM

## 2024-08-14 DIAGNOSIS — F51.12 BEHAVIORALLY INDUCED INSUFFICIENT SLEEP SYNDROME: ICD-10-CM

## 2024-08-14 PROCEDURE — 99204 OFFICE O/P NEW MOD 45 MIN: CPT | Performed by: STUDENT IN AN ORGANIZED HEALTH CARE EDUCATION/TRAINING PROGRAM

## 2024-08-14 RX ORDER — LISINOPRIL 30 MG/1
30 TABLET ORAL DAILY
Qty: 90 TABLET | Refills: 0 | Status: SHIPPED | OUTPATIENT
Start: 2024-08-14

## 2024-08-14 NOTE — TELEPHONE ENCOUNTER
Patient called asking for a 30 day supply of the medication, he is currently at the pharmacy and they informed him the do also have 30 mg strengths of the script, patient would like the medications switched to this for the 30 day supply,

## 2024-08-14 NOTE — PATIENT INSTRUCTIONS
LUCIO Evaluation:    I suspect you may have sleep apnea.  Sleep apnea is a serious sleep disorder that occurs when a person's breathing is interrupted during sleep. People with untreated sleep apnea stop breathing repeatedly during their sleep, sometimes hundreds of times during the night.  The most common type of sleep apnea is obstructive sleep apnea.  If left untreated, obstructive sleep apnea can result in a number of health problems including hypertension, stroke, arrhythmias, cardiomyopathy (enlargement of the muscle tissue of the heart), heart failure, diabetes, obesity, and heart attacks. It has also been found to make chronic medical conditions (such as high blood pressure, migraine headaches, and seizures (more difficult to manage.  Treatment options for obstructive sleep apnea may include positive airway pressure (PAP) therapy, oral appliance, hypoglossal nerve stimulator, nose/throat surgery, weight loss, side sleeping, or avoidance of medications or substances that can relax the airway muscles (alcohol, benzodiazepines, and opioids).  I have ordered a Home Sleep Apnea Test (HSAT) to evaluate for sleep apnea. If this is negative, I will order an in-lab polysomnogram (PSG) as discussed to be sure that sleep apnea is not present.  This test should be scheduled at your earliest convenience.   Sleep Clinic: This should be scheduled at the  before you leave today.  Avoid driving if drowsy. We recommend that if you are dozing off while driving, that you do not drive until your sleepiness is appropriately treated.  We encourage a healthy lifestyle with adequate sleep (7-9 hours per night), diet and exercise.  Recommend good sleep hygiene, as outlined below    Myself and/or my team will reach out to you via MyChart and/or phone call with the results and next steps.      Good Sleep Hygiene  Wake up at the same time every day, even on the weekends.  Use your bed for sleep and intimacy only.  If you have  been in bed awake for 30 minutes, get up and leave the bedroom. Choose a dull activity not involving a blue screen (TV, computer, handheld devices). Go back to bed when you feel sleepy.  Avoid caffeine, nicotine and alcohol before you go to bed.  Avoid large meals before you go to bed.  Avoid using screens (computers, tablets, smartphones, etc.) for at least 1 hour before bedtime  Exercise regularly, but do not exercise right before you go to bed.  Avoid daytime naps. If you do take a nap, sleep for 20-40 minutes, and not after dinner.

## 2024-08-14 NOTE — PROGRESS NOTES
[facility]  Sleep Medicine New Patient Evaluation    PATIENT NAME: Raymundo Loomis  DATE OF SERVICE: August 14, 2024      Assessment/Plan:  1. Witnessed apneic spells  Home Study      2. Snoring  Home Study      3. Behaviorally induced insufficient sleep syndrome  Home Study      4. Primary hypertension  Home Study      5. Excessive daytime sleepiness  Home Study         Raymundo is a very pleasant 44-year-old gentleman with PMHx of HTN, allergic rhinitis, prior obesity (current BMI 29.9), adjustment disorder with anxiety who presents for snoring and witnessed apneic events, some excessive daytime sleepiness.  Certainly, some component of his intermittent excessive daytime sleepiness could be secondary to his behaviorally induced insufficient sleep syndrome (secondary to work and other obligations), however he also does report substantial snoring and witnessed apneic spells while sleeping, and these coupled with his new diagnosis of HTN merit an evaluation for in treatment of sleep disordered breathing if present.    Discussed with patient the pathophysiology of OSAS and medical conditions associated with OSAS such as DM, HTN, CAD, Depression, Stroke, Headache.  Treatment options including surgery, Dental appliances, positional therapy, and CPAP were discussed.  I have ordered a Home Sleep Apnea Test (HSAT) to evaluate for sleep-disordered breathing. Should this be negative/inconclusive, due to the known limitations of HSAT, I will order an in lab polysomnogram (PSG) to ensure that sleep apnea is not present.  Advised patient to avoid activities that could harm self or others when tired/sleepy, including driving.  Encouraged maintaining normal weight  Discussed importance of good sleep hygiene.  Pending the results of the sleep study, we will plan to order CPAP with a subsequent compliance follow-up.  Emphasized the importance of trying to obtain 7 to 9 hours of sleep so as to avoid the  insufficient sleep syndrome as above  Encouraged him to continue treating his HTN through his PCP, and that management of LUCIO could help with this as well.  He will Return for Follow-up pending results of sleep study..    Thank you for allowing me to participate in the care of your patient.  If there are any questions regarding evaluation please feel free to reach out.       ________________________________________________________________________________________________    HPI: Raymundo Loomis is a 44 y.o. male with PMHx of HTN, allergic rhinitis, prior obesity (current BMI 29.9), adjustment disorder with anxiety who presents for snoring and witnessed apneic events, some excessive daytime sleepiness.    Sleep-Related History:     He says that he has snored for years, particularly when he lays on his back. However, his wife has noted that more recently he has started stopping breathing. Now recently has started being treated for HTN.     Thinks breathes more through nose during the day.     Garber Sleepiness Scale:  What are your chances of dozing?   0= no chance  1= slight chance  2= moderate chance  3= high chance    Sitting and readin   Watching TV: 1  Sitting, inactive in a public place (e.g. a theatre or a meeting):1  As a passenger in a car for an hour without a break: 0  Lying down to rest in the afternoon when circumstances permit: 3   Sitting and talking to someone: 0  Sitting quietly after a lunch without alcohol: 1  In a car, while stopped for a few minutes in the traffic: 0       TOTAL    Greater or equal to 10 is positive for excessive daytime sleepiness        SLEEP-WAKE SCHEDULE  He is self-described as a morning person.  Sleep Schedule:  Weekdays:  Bedtime:2200/2230   Asleep in <15 minutes   Nighttime awakenings: 1-2     Wake: 0400/0430    Naps: 0    Average total sleep time (in a 24 hour period): ~5-6 hours. Sometimes less due to work restrictions.     Weekends:   Bedtime:2200/2230   Asleep  in <15 minutes   Nighttime awakenings: 1-2     Wake: 0715    Naps: 0    Average total sleep time (in a 24 hour period): ~5-6 hours.    Sleep-Related Details:  Preferred Sleep Position: side(s)  SDB Symptoms: snoring, witnessed apneas, and waking unrefreshed  Bruxism: No  Nocturnal GERD: No  Nocturnal Palpitations: No  Nocturnal Anxiety or Rumination: No  Sleep-Related Hallucinations: No   Sleep Paralysis: No       He denies any parasomnia activity.    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   Drowsy Driving: No  Caffeine: Yes, but very rare  Alcohol Use: Yes, 1-2 monthly  Substance Use: No  Tobacco Use: No      He does not have difficulty with memory or concentration.      PAST TREATMENTS:  -None    PRIOR SLEEP STUDIES:  -None    OTHER RELEVANT LABS AND STUDIES:  -None    Past Medical History:   Diagnosis Date    Bile duct abnormality     leaking    Hearing loss     Perforated gallbladder     Resolved: 11/28/17      Past Surgical History:   Procedure Laterality Date    BACK SURGERY      CHOLECYSTECTOMY      CHOLECYSTECTOMY LAPAROSCOPIC N/A 11/8/2017    Procedure: CHOLECYSTECTOMY LAPAROSCOPIC, CONVERTED TO OPEN CHOLECYSTECTOMY, REPAIR UMBLICAL HERNIA  AND LYSIS OF ADHESIONS;  Surgeon: Sonia Santos MD;  Location: [facility];  Service: General    ERCP N/A 11/10/2017    Procedure: ENDOSCOPIC RETROGRADE CHOLANGIOPANCREATOGRAPHY (ERCP);  Surgeon: Renny Harden MD;  Location: [facility];  Service: Gastroenterology    ESOPHAGOGASTRODUODENOSCOPY      HERNIA REPAIR      KNEE ARTHROSCOPY      LIPOMA RESECTION      neck    CA ESOPHAGOGASTRODUODENOSCOPY TRANSORAL DIAGNOSTIC N/A 4/13/2018    Procedure: ESOPHAGOGASTRODUODENOSCOPY (EGD);  Surgeon: Renny Harden MD;  Location: [facility];  Service: Gastroenterology    TONSILLECTOMY AND ADENOIDECTOMY      VASECTOMY        Patient Active Problem List   Diagnosis     "Adjustment disorder with anxiety    Allergic rhinitis    Lipoma    Encounter for sterilization    Vegan diet    Annual physical exam    BMI 30.0-30.9,adult    Keratoacanthoma    HTN (hypertension)    S/P umbilical hernia repair, follow-up exam      Allergies as of 08/14/2024 - Reviewed 08/14/2024   Allergen Reaction Noted    Penicillins Hives 11/07/2017    Percocet [oxycodone-acetaminophen] GI Intolerance 03/05/2015         Review of Symptoms:    Review of Systems  10-point system review completed, all of which are negative except as mentioned above.    CURRENT MEDICATIONS:  Current Outpatient Medications   Medication Instructions    lisinopril (ZESTRIL) 30 mg, Oral, Daily    Multiple Vitamins-Minerals (MULTIVITAMIN ADULT PO) Oral         SOCIAL HISTORY:  Social History     Tobacco Use    Smoking status: Never    Smokeless tobacco: Never    Tobacco comments:     Per [employer]: Former cigar smoker   Vaping Use    Vaping status: Never Used   Substance Use Topics    Alcohol use: Yes     Comment: 1-2 monthly    Drug use: Never       FAMILY HISTORY:  Family History   Problem Relation Age of Onset    Bipolar disorder Mother         Affective    Asthma Father     Alcohol abuse Father     Post-traumatic stress disorder Father     Bipolar disorder Family       Family History of Sleep Disorders: Denies        PHYSICAL EXAMINATION:  Vital Signs:  /100   Pulse 85   Ht 6' 6\" (1.981 m)   Wt 117 kg (258 lb)   SpO2 99%   BMI 29.81 kg/m²  Body mass index is 29.81 kg/m².    Constitutional: NAD, well appearing   Mental Status: AAOx3  Neck Circumference: Neck Circumference: 16.5 inches  Skin: Warm, dry, no rashes noted   Eyes: PERRL, normal conjunctiva  ENT: Nasal congestion absent  Posterior Airspace:   Schilling Tongue Position: 4  Retrognathia: absent  Overbite: present  High Arched Palate: absent  Tongue Scalloping/Ridging: absent  Uvula: normal  Chest: No evidence of respiratory distress, no accessory muscle use; no " evidence of peripheral cyanosis  Abdomen: Soft, NT/ND  Extremities: No digital clubbing or pedal edema    NEUROLOGICAL EXAM:  General: Awake, alert, speech fluent, comprehension, naming, repetition intact. Short and long term memory intact.  CN: PERRL, EOMI without nystagmus, facial sensation and strength are normal and symmetric, hearing is intact to conversational tone, palate and tongue movements are intact and symmetric.  Motor: Normal tone, bulk and strength bilaterally.   Sensation: LT intact throughout.  Gait: Able to walk without difficulty. Stance normal.      I have spent a total time of 45-47 minutes on 08/14/24 in caring for this patient including Diagnostic results, Prognosis, Risks and benefits of tx options, Instructions for management, Patient and family education, Importance of tx compliance, Risk factor reductions, Documenting in the medical record, Reviewing / ordering tests, medicine, procedures  , and Obtaining or reviewing history  .        Electronically signed by:    Jorden Ortiz DO  Board-certified Neurology and Sleep Medicine  [facility]  08/14/24

## 2024-09-17 ENCOUNTER — HOSPITAL ENCOUNTER (OUTPATIENT)
Dept: SLEEP CENTER | Facility: CLINIC | Age: 44
Discharge: HOME | End: 2024-09-17
Payer: COMMERCIAL

## 2024-09-17 DIAGNOSIS — R06.83 SNORING: ICD-10-CM

## 2024-09-17 DIAGNOSIS — I10 PRIMARY HYPERTENSION: ICD-10-CM

## 2024-09-17 DIAGNOSIS — F51.12 BEHAVIORALLY INDUCED INSUFFICIENT SLEEP SYNDROME: ICD-10-CM

## 2024-09-17 DIAGNOSIS — G47.19 EXCESSIVE DAYTIME SLEEPINESS: ICD-10-CM

## 2024-09-17 DIAGNOSIS — R06.81 WITNESSED APNEIC SPELLS: ICD-10-CM

## 2024-09-17 PROCEDURE — G0399 HOME SLEEP TEST/TYPE 3 PORTA: HCPCS

## 2024-09-17 NOTE — PROGRESS NOTES
Home Sleep Study Documentation    HOME STUDY DEVICE: Noxturnal no                                           Debbie G3 yes      Pre-Sleep Home Study:    Set-up and instructions performed by: KS    Technician performed demonstration for Patient: yes    Return demonstration performed by Patient: yes    Written instructions provided to Patient: yes    Patient signed consent form: yes        Post-Sleep Home Study:    Additional comments by Patient: N/A    Home Sleep Study Failed:no:    Failure reason: N/A    Reported or Detected: N/A    Scored by: MONI Aiken

## 2024-09-18 PROBLEM — G47.33 OSA (OBSTRUCTIVE SLEEP APNEA): Status: ACTIVE | Noted: 2024-09-18

## 2024-09-20 DIAGNOSIS — R06.83 SNORING: ICD-10-CM

## 2024-09-20 DIAGNOSIS — G47.19 EXCESSIVE DAYTIME SLEEPINESS: Primary | ICD-10-CM

## 2024-09-20 DIAGNOSIS — I10 PRIMARY HYPERTENSION: ICD-10-CM

## 2024-09-20 DIAGNOSIS — R06.81 WITNESSED APNEIC SPELLS: ICD-10-CM

## 2024-09-20 PROCEDURE — 95806 SLEEP STUDY UNATT&RESP EFFT: CPT | Performed by: STUDENT IN AN ORGANIZED HEALTH CARE EDUCATION/TRAINING PROGRAM

## 2024-09-26 ENCOUNTER — TELEPHONE (OUTPATIENT)
Age: 44
End: 2024-09-26

## 2024-10-11 ENCOUNTER — OFFICE VISIT (OUTPATIENT)
Dept: FAMILY MEDICINE CLINIC | Facility: CLINIC | Age: 44
End: 2024-10-11

## 2024-10-11 VITALS
OXYGEN SATURATION: 98 % | BODY MASS INDEX: 30.07 KG/M2 | WEIGHT: 260.2 LBS | RESPIRATION RATE: 18 BRPM | DIASTOLIC BLOOD PRESSURE: 86 MMHG | HEART RATE: 81 BPM | SYSTOLIC BLOOD PRESSURE: 148 MMHG | TEMPERATURE: 97.9 F

## 2024-10-11 DIAGNOSIS — I10 PRIMARY HYPERTENSION: Primary | ICD-10-CM

## 2024-10-11 PROCEDURE — 99213 OFFICE O/P EST LOW 20 MIN: CPT | Performed by: FAMILY MEDICINE

## 2024-10-11 RX ORDER — LISINOPRIL 40 MG/1
40 TABLET ORAL DAILY
Qty: 30 TABLET | Refills: 0 | Status: SHIPPED | OUTPATIENT
Start: 2024-10-11

## 2024-10-11 NOTE — PROGRESS NOTES
Ambulatory Visit  Name: Raymundo Loomis      : 1980      MRN: [id number]  Encounter Provider: Cezar Ortiz DO  Encounter Date: 10/11/2024   Encounter department: [facility]    Assessment & Plan  Primary hypertension  - Current regimen: Lisinopril 30mg   - Per patient reports that home BPs 130s-140s/80s.   - Most recent BP Blood Pressure: 148/86    - Patient is hesitant to increase BP med dose but agreeable to trialing    Plan:  - Increase Lisinopril to 40mg daily  - Follow-up in about a week  - Instructed healthy lifestyle modification  - Sleep medicine evaluation scheduled for       Orders:    lisinopril (ZESTRIL) 40 mg tablet; Take 1 tablet (40 mg total) by mouth daily       History of Present Illness     Presents for BP check.  Patient has been on 30 mg lisinopril daily, and states that he is compliant on it.  His pressures at home have been in the 130s-140s SBP.  He denies any low blood pressures, headaches, dizziness, vision changes, chest pain.  He has been hesitant about increasing his medication dosing because he worries about feeling lightheaded, dizzy.    Hypertension  This is a chronic problem. The current episode started more than 1 month ago. The problem is unchanged. The problem is uncontrolled. Pertinent negatives include no anxiety, blurred vision, chest pain, headaches, orthopnea, palpitations, shortness of breath or sweats. There are no associated agents to hypertension. Risk factors for coronary artery disease include male gender. Past treatments include ACE inhibitors. The current treatment provides mild improvement. There are no compliance problems.          Review of Systems   Constitutional:  Negative for chills, fatigue and fever.   HENT:  Negative for ear pain and sore throat.    Eyes:  Negative for blurred vision, pain and visual disturbance.   Respiratory:  Negative for cough, shortness of breath and wheezing.    Cardiovascular:  Negative  for chest pain, palpitations and orthopnea.   Gastrointestinal:  Negative for abdominal pain, diarrhea, nausea and vomiting.   Genitourinary:  Negative for dysuria and hematuria.   Musculoskeletal:  Negative for arthralgias and back pain.   Skin:  Negative for color change and rash.   Neurological:  Negative for dizziness, seizures, syncope, weakness, light-headedness and headaches.           Objective     /86 (BP Location: Left arm, Patient Position: Sitting, Cuff Size: Standard)   Pulse 81   Temp 97.9 °F (36.6 °C) (Temporal)   Resp 18   Wt 118 kg (260 lb 3.2 oz)   SpO2 98%   BMI 30.07 kg/m²     Physical Exam  Vitals and nursing note reviewed.   Constitutional:       General: He is not in acute distress.     Appearance: Normal appearance. He is well-developed.   HENT:      Head: Normocephalic and atraumatic.      Right Ear: External ear normal.      Left Ear: External ear normal.      Nose: Nose normal.      Mouth/Throat:      Mouth: Mucous membranes are moist.      Pharynx: Oropharynx is clear.   Eyes:      Extraocular Movements: Extraocular movements intact.      Conjunctiva/sclera: Conjunctivae normal.   Cardiovascular:      Rate and Rhythm: Normal rate and regular rhythm.      Heart sounds: Normal heart sounds. No murmur heard.  Pulmonary:      Effort: Pulmonary effort is normal. No respiratory distress.      Breath sounds: Normal breath sounds.   Abdominal:      General: Abdomen is flat. Bowel sounds are normal.      Palpations: Abdomen is soft.      Tenderness: There is no abdominal tenderness.   Musculoskeletal:         General: No swelling.      Right lower leg: No edema.      Left lower leg: No edema.   Skin:     General: Skin is warm and dry.      Capillary Refill: Capillary refill takes less than 2 seconds.   Neurological:      General: No focal deficit present.      Mental Status: He is alert and oriented to person, place, and time.   Psychiatric:         Mood and Affect: Mood normal.          Behavior: Behavior normal.

## 2024-10-12 NOTE — ASSESSMENT & PLAN NOTE
- Current regimen: Lisinopril 30mg   - Per patient reports that home BPs 130s-140s/80s.   - Most recent BP Blood Pressure: 148/86    - Patient is hesitant to increase BP med dose but agreeable to trialing    Plan:  - Increase Lisinopril to 40mg daily  - Follow-up in about a week  - Instructed healthy lifestyle modification  - Sleep medicine evaluation scheduled for 12/5      Orders:    lisinopril (ZESTRIL) 40 mg tablet; Take 1 tablet (40 mg total) by mouth daily

## 2024-11-01 ENCOUNTER — OFFICE VISIT (OUTPATIENT)
Dept: FAMILY MEDICINE CLINIC | Facility: CLINIC | Age: 44
End: 2024-11-01

## 2024-11-01 VITALS
HEART RATE: 65 BPM | SYSTOLIC BLOOD PRESSURE: 122 MMHG | TEMPERATURE: 97.5 F | WEIGHT: 260 LBS | DIASTOLIC BLOOD PRESSURE: 82 MMHG | OXYGEN SATURATION: 99 % | HEIGHT: 78 IN | BODY MASS INDEX: 30.08 KG/M2

## 2024-11-01 DIAGNOSIS — I10 PRIMARY HYPERTENSION: Primary | ICD-10-CM

## 2024-11-01 PROCEDURE — 99213 OFFICE O/P EST LOW 20 MIN: CPT | Performed by: FAMILY MEDICINE

## 2024-11-01 RX ORDER — LISINOPRIL 40 MG/1
40 TABLET ORAL DAILY
Qty: 30 TABLET | Refills: 3 | Status: SHIPPED | OUTPATIENT
Start: 2024-11-01

## 2024-11-01 NOTE — ASSESSMENT & PLAN NOTE
- Current regimen: Lisinopril 40mg   - Per patient reports that home -130s  - Most recent BP Blood Pressure: 122/82      Plan:  - Continue Lisinopril to 40mg daily  - Instructed healthy lifestyle modification  - Sleep medicine evaluation (in lab sleep study) scheduled for 12/5      Orders:    lisinopril (ZESTRIL) 40 mg tablet; Take 1 tablet (40 mg total) by mouth daily

## 2024-11-01 NOTE — PROGRESS NOTES
Ambulatory Visit  Name: Raymundo Loomis      : 1980      MRN: [id number]  Encounter Provider: Cezar Ortiz DO  Encounter Date: 2024   Encounter department: [facility]    Assessment & Plan  Primary hypertension  - Current regimen: Lisinopril 40mg   - Per patient reports that home -130s  - Most recent BP Blood Pressure: 122/82      Plan:  - Continue Lisinopril to 40mg daily  - Instructed healthy lifestyle modification  - Sleep medicine evaluation (in lab sleep study) scheduled for       Orders:    lisinopril (ZESTRIL) 40 mg tablet; Take 1 tablet (40 mg total) by mouth daily       History of Present Illness     Presents for BP check.  Patient has been on 40 mg lisinopril daily since last office visit about 3 weeks ago, and states that he is compliant on it.  His pressures at home have been in the 120s-130s SBP.  He denies any low blood pressures, headaches, dizziness, vision changes, chest pain.  He has been hesitant about increasing his medication dosing because he worries about feeling lightheaded, dizzy.    Hypertension  This is a chronic problem. The current episode started more than 1 month ago. The problem is unchanged. The problem is uncontrolled. Pertinent negatives include no anxiety, blurred vision, chest pain, headaches, orthopnea, palpitations, shortness of breath or sweats. There are no associated agents to hypertension. Risk factors for coronary artery disease include male gender. Past treatments include ACE inhibitors. The current treatment provides mild improvement. There are no compliance problems.  Identifiable causes of hypertension include sleep apnea.         Review of Systems   Constitutional:  Negative for chills, fatigue and fever.   HENT:  Negative for ear pain and sore throat.    Eyes:  Negative for blurred vision, pain and visual disturbance.   Respiratory:  Negative for cough, shortness of breath and wheezing.    Cardiovascular:   "Negative for chest pain, palpitations and orthopnea.   Gastrointestinal:  Negative for abdominal pain, diarrhea, nausea and vomiting.   Genitourinary:  Negative for dysuria and hematuria.   Musculoskeletal:  Negative for arthralgias and back pain.   Skin:  Negative for color change and rash.   Neurological:  Negative for dizziness, seizures, syncope, weakness, light-headedness and headaches.           Objective     /82   Pulse 65   Temp 97.5 °F (36.4 °C) (Temporal)   Ht 6' 6\" (1.981 m)   Wt 118 kg (260 lb)   SpO2 99%   BMI 30.05 kg/m²     Physical Exam  Vitals reviewed.   Constitutional:       General: He is not in acute distress.     Appearance: Normal appearance. He is well-developed.   HENT:      Head: Normocephalic and atraumatic.      Right Ear: External ear normal.      Left Ear: External ear normal.      Nose: Nose normal.      Mouth/Throat:      Mouth: Mucous membranes are moist.      Pharynx: Oropharynx is clear.   Eyes:      Extraocular Movements: Extraocular movements intact.      Conjunctiva/sclera: Conjunctivae normal.   Cardiovascular:      Rate and Rhythm: Normal rate and regular rhythm.      Heart sounds: Normal heart sounds. No murmur heard.  Pulmonary:      Effort: Pulmonary effort is normal. No respiratory distress.      Breath sounds: Normal breath sounds.   Abdominal:      General: Abdomen is flat. Bowel sounds are normal.      Palpations: Abdomen is soft.      Tenderness: There is no abdominal tenderness.   Musculoskeletal:         General: No swelling.      Right lower leg: No edema.      Left lower leg: No edema.   Skin:     General: Skin is warm and dry.      Capillary Refill: Capillary refill takes less than 2 seconds.   Neurological:      General: No focal deficit present.      Mental Status: He is alert and oriented to person, place, and time.   Psychiatric:         Mood and Affect: Mood normal.         Behavior: Behavior normal.         "

## 2024-11-21 ENCOUNTER — TELEPHONE (OUTPATIENT)
Dept: NEUROLOGY | Facility: CLINIC | Age: 44
End: 2024-11-21

## 2024-11-21 NOTE — TELEPHONE ENCOUNTER
Pt called to asking if he needs to r/s his sleep study appt because he got a call over 6 hrs ago about equipment issues in [place]. Spoke to Precious in regards to this. Precious stated the issue was fixed, pt does not need to be r/s.    Pt was also advised of the following instructions below. Pt expressed acknowledgement and understanding.      SLEEP DIAGNOSTIC STUDY  Patients who cancel their morning sleep study after 3:00 pm the day prior to the study, or cancel their evening sleep study after 12:00 pm on the day of the study, or fail to arrive for their scheduled appointment will be charged a $100 No Show Fee.  Patients scheduled for a Sunday study must cancel by Friday at 12:00 pm.  You are required to sign a Patient Financial Liability Acknowledgement Form regarding this fee and return the form to the [facility] Center in the postage paid envelope at least 24 hours prior to your study or your study will be cancelled.        Your hair must be clean and free of gels, sprays, and mousse.  Avoid caffeine products and alcohol on the day of the study.  If you take prescribed medication(s), please bring your medicine with you.  Bring any paperwork, including prescription for study from your doctor, as well as insurance cards, photo ID, and referrals.  Patients 15 years and under must have a parent or caregiver stay in the room during the sleep study.     Sleep studies usually require prior authorization 48 hours before the date of the study. Please notify your ordering physician of your study date. Studies without authorization 48 hours before the study date will be cancelled.    Dept directions for [facility]:  [facility], [address] [place] [address], [address], [place], [place], [address].  [phone]      Evening Appts: Use the intercom at the Night Study Entrance located on the 2nd Floor.  If you arrive and the building is locked, use the intercom located at the [facility]  Entrance to gain access.

## 2024-11-22 ENCOUNTER — HOSPITAL ENCOUNTER (OUTPATIENT)
Dept: SLEEP CENTER | Facility: CLINIC | Age: 44
Discharge: HOME | End: 2024-11-22
Payer: COMMERCIAL

## 2024-11-22 DIAGNOSIS — G47.19 EXCESSIVE DAYTIME SLEEPINESS: ICD-10-CM

## 2024-11-22 DIAGNOSIS — I10 PRIMARY HYPERTENSION: ICD-10-CM

## 2024-11-22 DIAGNOSIS — R06.81 WITNESSED APNEIC SPELLS: ICD-10-CM

## 2024-11-22 DIAGNOSIS — R06.83 SNORING: ICD-10-CM

## 2024-11-22 PROCEDURE — 95810 POLYSOM 6/> YRS 4/> PARAM: CPT

## 2024-11-22 PROCEDURE — 95810 POLYSOM 6/> YRS 4/> PARAM: CPT | Performed by: STUDENT IN AN ORGANIZED HEALTH CARE EDUCATION/TRAINING PROGRAM

## 2024-11-23 NOTE — PROGRESS NOTES
Sleep Study Documentation    Pre-Sleep Study       Sleep testing procedure explained to patient:YES    Patient napped prior to study:NO    Caffeine:Dayshift worker after 12PM.  Caffeine use:NO    Alcohol:Dayshift workers after 5PM: Alcohol use:NO    Typical day for patient:YES       Study Documentation    Sleep Study Indications: Witnessed apnea    Sleep Study: Diagnostic   Snore:Moderate  Supplemental O2: no      Minimum SaO2 83  Baseline SaO2 95    EKG abnormalities: no     EEG abnormalities: no    Were abnormal behaviors in sleep observed:NO    Is Total Sleep Study Recording Time < 2 hours: N/A    Is Total Sleep Study Recording Time > 2 hours but study is incomplete: N/A    Is Total Sleep Study Recording Time 6 hours or more but sleep was not obtained: NO    Patient classification: employed       Post-Sleep Study    Medication used at bedtime or during sleep study:YES other prescription medications    Patient reports time it took to fall asleep:less than 20 minutes    Patient reports waking up during study:3 or more times.  Patient reports returning to sleep without difficulty.    Patient reports sleeping 4 to 6 hours without dreaming.    Does the Patient feel this is a typical night of sleep:worse than usual    Patient rated sleepiness: Somewhat sleepy or tired    PAP treatment:no.

## 2024-12-06 ENCOUNTER — RESULTS FOLLOW-UP (OUTPATIENT)
Dept: SLEEP CENTER | Facility: CLINIC | Age: 44
End: 2024-12-06

## 2024-12-06 ENCOUNTER — OFFICE VISIT (OUTPATIENT)
Dept: FAMILY MEDICINE CLINIC | Facility: CLINIC | Age: 44
End: 2024-12-06

## 2024-12-06 VITALS
WEIGHT: 261 LBS | HEIGHT: 78 IN | TEMPERATURE: 97.9 F | BODY MASS INDEX: 30.2 KG/M2 | DIASTOLIC BLOOD PRESSURE: 80 MMHG | OXYGEN SATURATION: 97 % | SYSTOLIC BLOOD PRESSURE: 138 MMHG | HEART RATE: 90 BPM

## 2024-12-06 DIAGNOSIS — Z12.11 SCREEN FOR COLON CANCER: ICD-10-CM

## 2024-12-06 DIAGNOSIS — I10 PRIMARY HYPERTENSION: ICD-10-CM

## 2024-12-06 DIAGNOSIS — Z00.00 ANNUAL PHYSICAL EXAM: Primary | ICD-10-CM

## 2024-12-06 NOTE — ASSESSMENT & PLAN NOTE
Patient due for colon cancer screening  Has not had initial screening yet  No GI symptoms  No known family history of colon cancer    Plan:  -GI referral for colon cancer screening      Orders:    Ambulatory Referral to Gastroenterology; Future

## 2024-12-06 NOTE — ASSESSMENT & PLAN NOTE
Body mass index is 30.16 kg/m².    Plan:  -Lipid panel ordered    Orders:    Lipid panel; Future

## 2024-12-06 NOTE — ASSESSMENT & PLAN NOTE
- Current regimen: Lisinopril 40mg   - Per patient reports that home -130s  - Most recent BP Blood Pressure: 138/80    Awaiting in lab polysomnography results    Plan:  - Continue Lisinopril to 40mg daily  - Instructed healthy lifestyle modification  - Pending read on in-lab sleep study

## 2024-12-06 NOTE — PATIENT INSTRUCTIONS
"Patient Education     Routine physical for adults   The Basics   Written by the doctors and editors at [facility]   What is a physical? -- A physical is a routine visit, or \"check-up,\" with your doctor. You might also hear it called a \"wellness visit\" or \"preventive visit.\"  During each visit, the doctor will:   Ask about your physical and mental health   Ask about your habits, behaviors, and lifestyle   Do an exam   Give you vaccines if needed   Talk to you about any medicines you take   Give advice about your health   Answer your questions  Getting regular check-ups is an important part of taking care of your health. It can help your doctor find and treat any problems you have. But it's also important for preventing health problems.  A routine physical is different from a \"sick visit.\" A sick visit is when you see a doctor because of a health concern or problem. Since physicals are scheduled ahead of time, you can think about what you want to ask the doctor.  How often should I get a physical? -- It depends on your age and health. In general, for people age 21 years and older:   If you are younger than 50 years, you might be able to get a physical every 3 years.   If you are 50 years or older, your doctor might recommend a physical every year.  If you have an ongoing health condition, like diabetes or high blood pressure, your doctor will probably want to see you more often.  What happens during a physical? -- In general, each visit will include:   Physical exam - The doctor or nurse will check your height, weight, heart rate, and blood pressure. They will also look at your eyes and ears. They will ask about how you are feeling and whether you have any symptoms that bother you.   Medicines - It's a good idea to bring a list of all the medicines you take to each doctor visit. Your doctor will talk to you about your medicines and answer any questions. Tell them if you are having any side effects that bother you. You " "should also tell them if you are having trouble paying for any of your medicines.   Habits and behaviors - This includes:   Your diet   Your exercise habits   Whether you smoke, drink alcohol, or use drugs   Whether you are sexually active   Whether you feel safe at home  Your doctor will talk to you about things you can do to improve your health and lower your risk of health problems. They will also offer help and support. For example, if you want to quit smoking, they can give you advice and might prescribe medicines. If you want to improve your diet or get more physical activity, they can help you with this, too.   Lab tests, if needed - The tests you get will depend on your age and situation. For example, your doctor might want to check your:   Cholesterol   Blood sugar   Iron level   Vaccines - The recommended vaccines will depend on your age, health, and what vaccines you already had. Vaccines are very important because they can prevent certain serious or deadly infections.   Discussion of screening - \"Screening\" means checking for diseases or other health problems before they cause symptoms. Your doctor can recommend screening based on your age, risk, and preferences. This might include tests to check for:   Cancer, such as breast, prostate, cervical, ovarian, colorectal, prostate, lung, or skin cancer   Sexually transmitted infections, such as chlamydia and gonorrhea   Mental health conditions like depression and anxiety  Your doctor will talk to you about the different types of screening tests. They can help you decide which screenings to have. They can also explain what the results might mean.   Answering questions - The physical is a good time to ask the doctor or nurse questions about your health. If needed, they can refer you to other doctors or specialists, too.  Adults older than 65 years often need other care, too. As you get older, your doctor will talk to you about:   How to prevent falling at " home   Hearing or vision tests   Memory testing   How to take your medicines safely   Making sure that you have the help and support you need at home  All topics are updated as new evidence becomes available and our peer review process is complete.  This topic retrieved from [employer] on: May 02, 2024.  Topic 802066 Version 1.0  Release: 32.4.3 - C32.122  © 2024 UpToDate, Inc. and/or its affiliates. All rights reserved.  Consumer Information Use and Disclaimer   Disclaimer: This generalized information is a limited summary of diagnosis, treatment, and/or medication information. It is not meant to be comprehensive and should be used as a tool to help the user understand and/or assess potential diagnostic and treatment options. It does NOT include all information about conditions, treatments, medications, side effects, or risks that may apply to a specific patient. It is not intended to be medical advice or a substitute for the medical advice, diagnosis, or treatment of a health care provider based on the health care provider's examination and assessment of a patient's specific and unique circumstances. Patients must speak with a health care provider for complete information about their health, medical questions, and treatment options, including any risks or benefits regarding use of medications. This information does not endorse any treatments or medications as safe, effective, or approved for treating a specific patient. UpToDate, Inc. and its affiliates disclaim any warranty or liability relating to this information or the use thereof.The use of this information is governed by the Terms of Use, available at https://www.w[employer] Thingsuwer.com/en/know/clinical-effectiveness-terms. 2024© UpToDate, Inc. and its affiliates and/or licensors. All rights reserved.  Copyright   © 2024 UpToDate, Inc. and/or its affiliates. All rights reserved.

## 2024-12-06 NOTE — PROGRESS NOTES
Adult Annual Physical  Name: Raymundo Loomis      : 1980      MRN: [id number]  Encounter Provider: Cezar Ortiz DO  Encounter Date: 2024   Encounter department: [facility]    Assessment & Plan  Annual physical exam  No acute concerns today  Due for colorectal cancer screening; never had  No GI symptoms  Did undergo in lab polysomnography and awaiting results  Declining flu shot today       Primary hypertension  - Current regimen: Lisinopril 40mg   - Per patient reports that home -130s  - Most recent BP Blood Pressure: 138/80    Awaiting in lab polysomnography results    Plan:  - Continue Lisinopril to 40mg daily  - Instructed healthy lifestyle modification  - Pending read on in-lab sleep study           BMI 30.0-30.9,adult  Body mass index is 30.16 kg/m².    Plan:  -Lipid panel ordered    Orders:    Lipid panel; Future    Screen for colon cancer  Patient due for colon cancer screening  Has not had initial screening yet  No GI symptoms  No known family history of colon cancer    Plan:  -GI referral for colon cancer screening      Orders:    Ambulatory Referral to Gastroenterology; Future    Immunizations and preventive care screenings were discussed with patient today. Appropriate education was printed on patient's after visit summary.    Discussed risks and benefits of prostate cancer screening. We discussed the controversial history of PSA screening for prostate cancer in the [place] as well as the risk of over detection and over treatment of prostate cancer by way of PSA screening.  The patient understands that PSA blood testing is an imperfect way to screen for prostate cancer and that elevated PSA levels in the blood may also be caused by infection, inflammation, prostatic trauma or manipulation, urological procedures, or by benign prostatic enlargement.    The role of the digital rectal examination in prostate cancer screening was also discussed  and I discussed with him that there is large interobserver variability in the findings of digital rectal examination.    Counseling:  Alcohol/drug use: discussed moderation in alcohol intake, the recommendations for healthy alcohol use, and avoidance of illicit drug use.  Dental Health: discussed importance of regular tooth brushing, flossing, and dental visits.  Injury prevention: discussed safety/seat belts, safety helmets, smoke detectors, carbon monoxide detectors, and smoking near bedding or upholstery.  Sexual health: discussed sexually transmitted diseases, partner selection, use of condoms, avoidance of unintended pregnancy, and contraceptive alternatives.  Exercise: the importance of regular exercise/physical activity was discussed. Recommend exercise 3-5 times per week for at least 30 minutes.          History of Present Illness     Adult Annual Physical:  Patient presents for annual physical. Patient is a 44-year-old male with history of hypertension who presents for annual physical today.  He has no acute complaints.  He did recently undergo in lab polysomnography and we are awaiting results.  Blood pressure has been controlled on his current regimen.  He is also due for colorectal cancer screening and has never had that done before.  He is agreeable today.  He is declining flu shot today..     Diet and Physical Activity:  - Diet/Nutrition: well balanced diet and consuming 3-5 servings of fruits/vegetables daily.  - Exercise: 3-4 times a week on average and moderate cardiovascular exercise.    Depression Screening:  - PHQ-2 Score: 0    General Health:  - Sleep: 4-6 hours of sleep on average and snores loudly. Got a sleep study 2 weeks ago for sleep apnea because wife noticed he would stop breathing at night, loud snoring  - Vision: wears glasses. Regularly sees opthamologist  - Dental: no dental visits for > 1 year. Appointment every 1-2 years     Health:  - History of STDs: no.   - Urinary symptoms:  "none.     Review of Systems   Constitutional:  Negative for chills, fatigue and fever.   HENT:  Negative for ear pain and sore throat.    Eyes:  Negative for pain and visual disturbance.   Respiratory:  Negative for cough, shortness of breath and wheezing.    Cardiovascular:  Negative for chest pain and palpitations.   Gastrointestinal:  Negative for abdominal pain, diarrhea, nausea and vomiting.   Genitourinary:  Negative for dysuria and hematuria.   Musculoskeletal:  Negative for arthralgias and back pain.   Skin:  Negative for color change and rash.   Neurological:  Negative for dizziness, seizures, syncope, weakness, light-headedness and headaches.     Current Outpatient Medications on File Prior to Visit   Medication Sig Dispense Refill    lisinopril (ZESTRIL) 40 mg tablet Take 1 tablet (40 mg total) by mouth daily 30 tablet 3    Multiple Vitamins-Minerals (MULTIVITAMIN ADULT PO) Take by mouth       No current facility-administered medications on file prior to visit.        Objective   /80   Pulse 90   Temp 97.9 °F (36.6 °C) (Temporal)   Ht 6' 6\" (1.981 m)   Wt 118 kg (261 lb)   SpO2 97%   BMI 30.16 kg/m²     Physical Exam  Vitals reviewed.   Constitutional:       General: He is not in acute distress.     Appearance: Normal appearance. He is well-developed.   HENT:      Head: Normocephalic and atraumatic.      Right Ear: External ear normal.      Left Ear: External ear normal.      Nose: Nose normal.      Mouth/Throat:      Mouth: Mucous membranes are moist.      Pharynx: Oropharynx is clear.   Eyes:      Extraocular Movements: Extraocular movements intact.      Conjunctiva/sclera: Conjunctivae normal.   Cardiovascular:      Rate and Rhythm: Normal rate and regular rhythm.      Heart sounds: Normal heart sounds. No murmur heard.  Pulmonary:      Effort: Pulmonary effort is normal. No respiratory distress.      Breath sounds: Normal breath sounds.   Abdominal:      General: Abdomen is flat. Bowel " sounds are normal.      Palpations: Abdomen is soft.      Tenderness: There is no abdominal tenderness.   Musculoskeletal:         General: No swelling.      Right lower leg: No edema.      Left lower leg: No edema.   Skin:     General: Skin is warm and dry.      Capillary Refill: Capillary refill takes less than 2 seconds.   Neurological:      General: No focal deficit present.      Mental Status: He is alert and oriented to person, place, and time.   Psychiatric:         Mood and Affect: Mood normal.         Behavior: Behavior normal.

## 2024-12-06 NOTE — ASSESSMENT & PLAN NOTE
No acute concerns today  Due for colorectal cancer screening; never had  No GI symptoms  Did undergo in lab polysomnography and awaiting results  Declining flu shot today

## 2024-12-13 DIAGNOSIS — G47.33 OSA (OBSTRUCTIVE SLEEP APNEA): Primary | ICD-10-CM

## 2024-12-13 DIAGNOSIS — G47.19 EXCESSIVE DAYTIME SLEEPINESS: ICD-10-CM

## 2024-12-13 DIAGNOSIS — I10 PRIMARY HYPERTENSION: ICD-10-CM

## 2024-12-13 RX ORDER — LISINOPRIL 40 MG/1
40 TABLET ORAL DAILY
Qty: 90 TABLET | Refills: 3 | Status: SHIPPED | OUTPATIENT
Start: 2024-12-13

## 2024-12-16 ENCOUNTER — TELEPHONE (OUTPATIENT)
Dept: SLEEP CENTER | Facility: CLINIC | Age: 44
End: 2024-12-16

## 2024-12-16 LAB

## 2024-12-19 ENCOUNTER — TELEPHONE (OUTPATIENT)
Dept: FAMILY MEDICINE CLINIC | Facility: CLINIC | Age: 44
End: 2024-12-19

## 2024-12-19 NOTE — TELEPHONE ENCOUNTER
"Patient called and left voicemail on our clinical line,    \"Hi, my name is Raymundo Loomis. Phone number is [phone]. I'm calling regarding I'm on lisinopril and I was due to  my refill the other day and I also at the same time last week filled in for sent a request for it to be switched to my online pharmacy. Problem is the online pharmacy is not going to process my request till at least next week. I went to  the current refill from the pharmacy and they said it was already cancelled and I am out of my medicine. If you could give me a call that would be great. [phone] trying to see what I can do because the online pharmacy is not supposed to ship me my medicine till at least next week and that's with Gregory who knows when I will get it. I took my last one last night and I have like I said, I was over at the pharmacy this weekend to pick it up and they said it was cancelled by the doctor. Probably when they sent it to the online pharmacy they cancelled the existing one that I needed to . My number again is [phone]. Thank you.\"    "

## 2024-12-19 NOTE — TELEPHONE ENCOUNTER
Called and LVM for patient to call the office back to ensure medication was received or if a pharmacy change is still needed,       [employer] message sent to patient,

## 2024-12-26 LAB

## 2024-12-31 LAB

## 2025-01-05 PROBLEM — Z12.11 SCREEN FOR COLON CANCER: Status: RESOLVED | Noted: 2024-12-06 | Resolved: 2025-01-05

## 2025-01-17 ENCOUNTER — TELEPHONE (OUTPATIENT)
Dept: SLEEP CENTER | Facility: CLINIC | Age: 45
End: 2025-01-17

## 2025-01-17 NOTE — TELEPHONE ENCOUNTER
Confirmation of order form for CPAP supplies received via fax from [facility].    Form placed in Sr. Ortiz's inbox in [facility] to complete.    Completed form to be faxed to [phone].

## 2025-02-11 NOTE — TELEPHONE ENCOUNTER
Shaheen Foster,   Pt states he saw you while inpt at [facility]  Pt had an ercp stent placement while inpt by Dr James Cade per pt he was to contact out office to schedule another ercp  Should he be following up with our group or Dr James Cade? Can you please put in orders for the procedure the pt needs and let me know the time frame     Thanks   Mateo Humphrey This office note has been dictated.  Job#[id number]

## 2025-02-25 ENCOUNTER — OFFICE VISIT (OUTPATIENT)
Dept: SLEEP CENTER | Facility: CLINIC | Age: 45
End: 2025-02-25
Payer: COMMERCIAL

## 2025-02-25 VITALS
HEIGHT: 78 IN | WEIGHT: 270 LBS | BODY MASS INDEX: 31.24 KG/M2 | SYSTOLIC BLOOD PRESSURE: 121 MMHG | DIASTOLIC BLOOD PRESSURE: 80 MMHG

## 2025-02-25 DIAGNOSIS — R06.81 WITNESSED APNEIC SPELLS: ICD-10-CM

## 2025-02-25 DIAGNOSIS — G47.19 EXCESSIVE DAYTIME SLEEPINESS: ICD-10-CM

## 2025-02-25 DIAGNOSIS — R06.83 SNORING: ICD-10-CM

## 2025-02-25 DIAGNOSIS — G47.33 OSA (OBSTRUCTIVE SLEEP APNEA): Primary | ICD-10-CM

## 2025-02-25 DIAGNOSIS — I10 PRIMARY HYPERTENSION: ICD-10-CM

## 2025-02-25 PROCEDURE — 99214 OFFICE O/P EST MOD 30 MIN: CPT | Performed by: INTERNAL MEDICINE

## 2025-02-25 NOTE — PROGRESS NOTES
Name: Raymundo Loomis      : 1980      MRN: [id number]  Encounter Provider: Khai Grijalva MD  Encounter Date: 2025   Encounter department: [facility] SLEEP [facility]IC[facility] [place]  :  Assessment & Plan  LUCIO (obstructive sleep apnea)  Mild symptomatic LUCIO on auto CPAP 5-20  Reviewed compliance 97% usage minimal residual AHI 0.6 excellent mask leak consistent with adequate treatment of LUCIO    I reminded  the patient the pathophysiology of obstructive sleep apnea (LUCIO), explaining that the condition involves intermittent blockage of the upper airway during sleep, which leads to brief pauses in breathing and drops in oxygen levels. These frequent episodes of hypoxia and disrupted sleep trigger an increase in sympathetic nervous system activity, which raises blood pressure and stresses the cardiovascular system. I emphasized that inadequately treated LUCIO is strongly associated with a range of serious health consequences. Specifically, chronic untreated LUCIO significantly raises the risk of developing hypertension, atrial fibrillation (A-fib), and heart failure due to the prolonged strain on the heart and blood vessels. I discussed how untreated LUCOI is also a major risk factor for stroke, as intermittent hypoxia and elevated blood pressure can lead to the development of atherosclerosis and the formation of blood clots. Furthermore, I explained the growing body of evidence suggesting that untreated LUCIO is linked to cognitive decline, including an increased risk of early-onset dementia, particularly Alzheimer's disease. This is thought to result from the effects of chronic sleep disruption and hypoxia on brain function.     I stressed that effective management of LUCIO, through adherence to CPAP/BiPAP therapy, and weight loss, are essential to reducing these risks.     Orders:    PAP DME Resupply/Reorder    Excessive daytime sleepiness  Significant improvement using the CPAP  Orders:    PAP DME  Resupply/Reorder    Snoring  Significant improvement using the CPAP  Orders:    PAP DME Resupply/Reorder    Witnessed apneic spells  Significant improvement using the CPAP  Orders:    PAP DME Resupply/Reorder    Primary hypertension  Treatment of LUCIO helps control blood pressure  Orders:    PAP DME Resupply/Reorder        History of Present Illness     44-year-old with past medical history of hypertension, excessive daytime sleepiness, daytime frequent headaches here for follow-up compliance visit after having been diagnosed with mild LUCIO in view of his symptoms and underlying hypertension he was started on a trial of auto CPAP with significant improvement of snoring, witnessed apneas, daytime fatigue, sleep quality, and daytime headaches    Sleep routine is as detailed below:  What time do you typically go to bed on weekdays or workdays? 11:00 PM   What time do you typically go to bed weekends or days off? 10:30 PM   How long does it take to fall asleep? 0-15 minutes   How often does it take more than 30 minutes to fall asleep? Rare   How often do you sleep through the night without waking up? 3 or more times per week   If you do wake during the night, how many times do you typically wake up during your sleep time? 1-2 times per night   How often is it hard for you to return to sleep after awakening in the middle of the night? Rare   What time do you typically wake up on weekdays or workdays? 4:45 AM or earlier   What time do you typically wake up on weekends or days off? 7:30 AM   How many hours do you sleep on average? 5   Do you typically feel refreshed when you first awaken? Usually       Laurens scale    Sitting and reading: (Patient-Rptd) (P) Slight chance of dozing  Watching TV: (Patient-Rptd) (P) Slight chance of dozing  Sitting, inactive in a public place (e.g. a theatre or a meeting): (Patient-Rptd) (P) Would never doze  As a passenger in a car for an hour without a break: (Patient-Rptd) (P) Would never  doze  Lying down to rest in the afternoon when circumstances permit: (Patient-Rptd) (P) Moderate chance of dozing  Sitting and talking to someone: (Patient-Rptd) (P) Would never doze  Sitting quietly after a lunch without alcohol: (Patient-Rptd) (P) Would never doze  In a car, while stopped for a few minutes in traffic: (Patient-Rptd) (P) Would never doze  Total score: (Patient-Rptd) (P) 4       Pertinent positives/negatives included in HPI and also as noted:     Medical History Reviewed by provider this encounter:  Tobacco  Allergies  Meds  Problems  Med Hx  Surg Hx  Fam Hx     .  Past Medical History   Past Medical History:   Diagnosis Date    Bile duct abnormality     leaking    Hearing loss     Perforated gallbladder     Resolved: 11/28/17     Past Surgical History:   Procedure Laterality Date    BACK SURGERY      CHOLECYSTECTOMY      CHOLECYSTECTOMY LAPAROSCOPIC N/A 11/8/2017    Procedure: CHOLECYSTECTOMY LAPAROSCOPIC, CONVERTED TO OPEN CHOLECYSTECTOMY, REPAIR UMBLICAL HERNIA  AND LYSIS OF ADHESIONS;  Surgeon: Sonia Santos MD;  Location: [facility];  Service: General    ERCP N/A 11/10/2017    Procedure: ENDOSCOPIC RETROGRADE CHOLANGIOPANCREATOGRAPHY (ERCP);  Surgeon: Renny Harden MD;  Location: [facility];  Service: Gastroenterology    ESOPHAGOGASTRODUODENOSCOPY      HERNIA REPAIR      KNEE ARTHROSCOPY      LIPOMA RESECTION      neck    ND ESOPHAGOGASTRODUODENOSCOPY TRANSORAL DIAGNOSTIC N/A 4/13/2018    Procedure: ESOPHAGOGASTRODUODENOSCOPY (EGD);  Surgeon: Renny Harden MD;  Location: [facility];  Service: Gastroenterology    TONSILLECTOMY AND ADENOIDECTOMY      VASECTOMY       Family History   Problem Relation Age of Onset    Bipolar disorder Mother         Affective    Asthma Father     Alcohol abuse Father     Post-traumatic stress disorder Father     Bipolar disorder Family       reports that he has never smoked. He has never used smokeless tobacco. He reports current alcohol use. He  "reports that he does not use drugs.  Current Outpatient Medications   Medication Instructions    lisinopril (ZESTRIL) 40 mg, Oral, Daily    Multiple Vitamins-Minerals (MULTIVITAMIN ADULT PO) Take by mouth     Allergies   Allergen Reactions    Penicillins Hives    Percocet [Oxycodone-Acetaminophen] GI Intolerance      Current Outpatient Medications on File Prior to Visit   Medication Sig Dispense Refill    lisinopril (ZESTRIL) 40 mg tablet Take 1 tablet (40 mg total) by mouth daily 90 tablet 3    Multiple Vitamins-Minerals (MULTIVITAMIN ADULT PO) Take by mouth       No current facility-administered medications on file prior to visit.      Social History     Tobacco Use    Smoking status: Never    Smokeless tobacco: Never    Tobacco comments:     Per [employer]: Former cigar smoker   Vaping Use    Vaping status: Never Used   Substance and Sexual Activity    Alcohol use: Yes     Comment: 1-2 monthly    Drug use: Never    Sexual activity: Yes     Partners: Female     Birth control/protection: Condom Male     Objective   /80 (BP Location: Left arm, Patient Position: Sitting, Cuff Size: Large)   Ht 6' 6\" (1.981 m)   Wt 122 kg (270 lb)   BMI 31.20 kg/m²        Physical Exam  Constitutional:       Appearance: Normal appearance.   HENT:      Head: Normocephalic and atraumatic.      Nose: No congestion or rhinorrhea.      Mouth/Throat:      Mouth: Mucous membranes are moist.      Pharynx: Oropharynx is clear. No oropharyngeal exudate.   Eyes:      General: No scleral icterus.        Right eye: No discharge.         Left eye: No discharge.      Conjunctiva/sclera: Conjunctivae normal.   Cardiovascular:      Rate and Rhythm: Normal rate and regular rhythm.      Pulses: Normal pulses.      Heart sounds: Normal heart sounds.      No gallop.   Pulmonary:      Effort: Pulmonary effort is normal.      Breath sounds: Normal breath sounds. No stridor. No wheezing or rhonchi.   Musculoskeletal:         General: No swelling or " "tenderness.      Cervical back: Normal range of motion and neck supple.   Skin:     General: Skin is warm.      Coloration: Skin is not pale.      Findings: No erythema.   Neurological:      General: No focal deficit present.      Mental Status: He is alert and oriented to person, place, and time. Mental status is at baseline.   Psychiatric:         Mood and Affect: Mood normal.         Behavior: Behavior normal.         Data  Lab Results   Component Value Date    HGB 15.5 12/22/2023    HCT 44.1 12/22/2023    MCV 87 12/22/2023      Lab Results   Component Value Date    GLUCOSE 170 (H) 11/07/2017    CALCIUM 9.2 12/22/2023    K 4.0 12/22/2023    CO2 30 12/22/2023     12/22/2023    BUN 11 12/22/2023    CREATININE 1.03 12/22/2023     No results found for: \"IRON\", \"TIBC\", \"FERRITIN\"  Lab Results   Component Value Date    AST 17 12/22/2023    ALT 12 12/22/2023     Reviewed sleep study with AHI of 6.7 consistent with mild LUCIO    Reviewed compliance as detailed below on the assessment plan consistent with excellent compliance and adequate treatment of LUCIO      Administrative Statements   I have spent a total time of 30 minutes in caring for this patient on the day of the visit/encounter including Impressions, Documenting in the medical record, and Reviewing/placing orders in the medical record (including tests, medications, and/or procedures).  "

## 2025-02-25 NOTE — ASSESSMENT & PLAN NOTE
Mild symptomatic LUCIO on auto CPAP 5-20  Reviewed compliance 97% usage minimal residual AHI 0.6 excellent mask leak consistent with adequate treatment of LUCIO    I reminded  the patient the pathophysiology of obstructive sleep apnea (LUCIO), explaining that the condition involves intermittent blockage of the upper airway during sleep, which leads to brief pauses in breathing and drops in oxygen levels. These frequent episodes of hypoxia and disrupted sleep trigger an increase in sympathetic nervous system activity, which raises blood pressure and stresses the cardiovascular system. I emphasized that inadequately treated LUCIO is strongly associated with a range of serious health consequences. Specifically, chronic untreated LUCIO significantly raises the risk of developing hypertension, atrial fibrillation (A-fib), and heart failure due to the prolonged strain on the heart and blood vessels. I discussed how untreated LUCIO is also a major risk factor for stroke, as intermittent hypoxia and elevated blood pressure can lead to the development of atherosclerosis and the formation of blood clots. Furthermore, I explained the growing body of evidence suggesting that untreated LUCIO is linked to cognitive decline, including an increased risk of early-onset dementia, particularly Alzheimer's disease. This is thought to result from the effects of chronic sleep disruption and hypoxia on brain function.     I stressed that effective management of LUCIO, through adherence to CPAP/BiPAP therapy, and weight loss, are essential to reducing these risks.     Orders:    PAP DME Resupply/Reorder

## 2025-02-25 NOTE — PATIENT INSTRUCTIONS
"Patient Education     Sleep apnea in adults   The Basics   Written by the doctors and editors at [facility]   What is sleep apnea? -- Sleep apnea is a condition that makes you stop breathing for short periods while you are asleep. There are 2 types of sleep apnea. One is called \"obstructive sleep apnea.\" The other is called \"central sleep apnea.\"  In obstructive sleep apnea, you stop breathing because your throat narrows or closes (figure 1). In central sleep apnea, you stop breathing because your brain does not send the right signals to your muscles to make you breathe. When people talk about sleep apnea, they are usually referring to obstructive sleep apnea, which is what this article is about.  People with sleep apnea do not know that they stop breathing when they are asleep. But they do sometimes wake up startled or gasping for breath. They also often hear from loved ones that they snore.  What are the symptoms of sleep apnea? -- The main symptoms of sleep apnea are loud snoring, tiredness, and daytime sleepiness. Other symptoms can include:   Restless sleep   Waking up choking or gasping   Morning headaches, dry mouth, or sore throat   Waking up often to urinate   Waking up feeling unrested or groggy   Trouble thinking clearly or remembering things  Some people with sleep apnea don't have symptoms, or don't realize that they have them.  Should I see a doctor or nurse? -- Yes. If you think that you might have sleep apnea, see your doctor.  Is there a test for sleep apnea? -- Yes. First, your doctor or nurse will ask about your symptoms. If you have a bed partner, they might also ask that person if you snore or gasp in your sleep. If the doctor or nurse suspects that you have sleep apnea, they might send you for a \"sleep study.\"  Sleep studies can sometimes be done at home, but they are usually done in a sleep lab. For the study, you spend the night in the lab, and you are hooked up to different machines that " "monitor your heart rate, breathing, and other body functions. The results of the test tell your doctor or nurse if you have the disorder.  Is there anything I can do on my own to help my sleep apnea? -- Yes. Some things that might help:   Try to avoid sleeping on your back, if possible. This might help some people.   Lose weight, if you have excess body weight.   Get regular physical activity. This might help you lose weight. But even if it doesn't, being active is good for your health.   Avoid alcohol, especially in the evening. Alcohol can make sleep apnea worse.  How is sleep apnea treated? -- Treatment can include:   Weight loss - As mentioned above, weight loss can help if you have excess weight or obesity. But losing weight can be challenging, and it takes time to lose enough weight to help with your sleep apnea. Most people need other treatment while they work on losing weight.   CPAP - The most effective treatment for sleep apnea is a device that keeps your airway open while you sleep. Treatment with this device is called \"continuous positive airway pressure\" (\"CPAP\"). People getting CPAP wear a face mask at night that keeps them breathing (figure 2).  If your doctor or nurse recommends a CPAP machine, be patient about using it. The mask might seem uncomfortable to wear at first, and the machine might seem noisy, but using the machine can really help you. People with sleep apnea who use a CPAP machine feel more rested and generally feel better.  If CPAP does not work, your doctor might suggest other treatment. Options might include:   An oral device - This is a device that you wear in your mouth. It is called an \"oral appliance\" or \"mandibular advancement device.\" It helps keep your airway open while you sleep.   Hypoglossal nerve stimulation - This involves a procedure to implant a small device into your chest. The device has a wire that connects to the nerve under your tongue. While you are sleeping, it " sends an electrical signal that causes the tongue to push forward. This helps open up your airway.   Surgery to widen your airway - This might involve removing your tonsils or other tissue that blocks the airway.  Is sleep apnea dangerous? -- It can be. Risks include:   Accidents - People with sleep apnea do not get good-quality sleep, so they are often tired and not alert. This puts them at risk for car accidents and other types of accidents.   Other health problems - Studies show that people with sleep apnea are more likely than others to have high blood pressure, heart attacks, and other serious heart problems. Some people also have mood changes or depression.  In people with severe sleep apnea, getting treated (for example, with CPAP) can help lower these risks.  All topics are updated as new evidence becomes available and our peer review process is complete.  This topic retrieved from [employer] on: Feb 28, 2024.  Topic 11169 Version 18.0  Release: 32.2.4 - C32.58  © 2024 UpToDate, Inc. and/or its affiliates. All rights reserved.  figure 1: Airway in a person with sleep apnea     Normally, when a person sleeps, the airway remains open, and air can pass from the nose and mouth to the lungs. In a person with sleep apnea, parts of the throat and mouth drop into the airway and block off the flow of air. This can cause loud snoring and interrupt breathing for short periods.  Graphic 73296 Version 6.0  figure 2: Continuous positive airway pressure (CPAP) for sleep apnea     The CPAP mask gently blows air into your nose while you sleep. It puts just enough pressure on your airway to keep it from closing. The mask in this picture fits over just the nose. Other CPAP devices have masks that fit over the nose and mouth.  Graphic 43947 Version 5.0  Consumer Information Use and Disclaimer   Disclaimer: This generalized information is a limited summary of diagnosis, treatment, and/or medication information. It is not meant to  be comprehensive and should be used as a tool to help the user understand and/or assess potential diagnostic and treatment options. It does NOT include all information about conditions, treatments, medications, side effects, or risks that may apply to a specific patient. It is not intended to be medical advice or a substitute for the medical advice, diagnosis, or treatment of a health care provider based on the health care provider's examination and assessment of a patient's specific and unique circumstances. Patients must speak with a health care provider for complete information about their health, medical questions, and treatment options, including any risks or benefits regarding use of medications. This information does not endorse any treatments or medications as safe, effective, or approved for treating a specific patient. UpToDate, Inc. and its affiliates disclaim any warranty or liability relating to this information or the use thereof.The use of this information is governed by the Terms of Use, available at https://www.[employer].com/en/know/clinical-effectiveness-terms. 2024© UpToDate, Inc. and its affiliates and/or licensors. All rights reserved.  Copyright   © 2024 UpToDate, Inc. and/or its affiliates. All rights reserved.

## 2025-03-03 ENCOUNTER — TELEPHONE (OUTPATIENT)
Dept: SLEEP CENTER | Facility: CLINIC | Age: 45
End: 2025-03-03

## 2025-03-05 LAB

## 2025-03-06 DIAGNOSIS — Z00.6 ENCOUNTER FOR EXAMINATION FOR NORMAL COMPARISON OR CONTROL IN CLINICAL RESEARCH PROGRAM: ICD-10-CM

## 2025-05-16 ENCOUNTER — OFFICE VISIT (OUTPATIENT)
Dept: FAMILY MEDICINE CLINIC | Facility: CLINIC | Age: 45
End: 2025-05-16

## 2025-05-16 VITALS
WEIGHT: 273 LBS | BODY MASS INDEX: 31.59 KG/M2 | TEMPERATURE: 97.9 F | SYSTOLIC BLOOD PRESSURE: 150 MMHG | OXYGEN SATURATION: 98 % | HEART RATE: 71 BPM | HEIGHT: 78 IN | DIASTOLIC BLOOD PRESSURE: 90 MMHG

## 2025-05-16 DIAGNOSIS — N52.8 OTHER MALE ERECTILE DYSFUNCTION: ICD-10-CM

## 2025-05-16 DIAGNOSIS — I10 PRIMARY HYPERTENSION: Primary | ICD-10-CM

## 2025-05-16 PROCEDURE — 99213 OFFICE O/P EST LOW 20 MIN: CPT | Performed by: FAMILY MEDICINE

## 2025-05-16 NOTE — ASSESSMENT & PLAN NOTE
- Current regimen: Lisinopril 40mg   - Per patient reports that home -130s  - Initial /103  - Most recent BP Blood Pressure: 150/90    - Has white coat HTN; has been hesitant to increase regimen    Plan:  - Continue Lisinopril to 40mg daily  - Instructed healthy lifestyle modification  - Continue logging BP at home      Orders:    CBC and Platelet; Future

## 2025-05-16 NOTE — PROGRESS NOTES
Name: Raymunod Loomis      : 1980      MRN: [id number]  Encounter Provider: Cezar Ortiz DO  Encounter Date: 2025   Encounter department: [facility] [place]  :  Assessment & Plan  Primary hypertension  - Current regimen: Lisinopril 40mg   - Per patient reports that home -130s  - Initial /103  - Most recent BP Blood Pressure: 150/90    - Has white coat HTN; has been hesitant to increase regimen    Plan:  - Continue Lisinopril to 40mg daily  - Instructed healthy lifestyle modification  - Continue logging BP at home      Orders:    CBC and Platelet; Future      Other male erectile dysfunction  Reports worsening erectile dysfunction  Libido unchanged  Ejaculation not affected  Does not recall presence of spontaneous erections  Does not believe that stress is playing a part    Plan:  -CBC, TSH, CMP, Lipid panel, total free testosterone  -Management pending workup    Orders:    CBC and Platelet; Future    TSH, 3rd generation with Free T4 reflex; Future    Comprehensive metabolic panel; Future    Lipid panel; Future    Testosterone, free, total; Future         History of Present Illness   Patient is a 43yo M with PMH HTN who presents for BP check and to discuss erectile dysfunction.  Patient has been compliant on 40 mg lisinopril daily.  His pressures at home have been in the 120s-130s SBP.  He denies any low blood pressures, headaches, dizziness, vision changes, chest pain.  He has been hesitant to increase his regimen.    He also reports erectile dysfunction.  This has been slightly worsening for the past few months.  He does not believe that there is stress or performance anxiety involved.  He does not recall AM spontaneous erections.  He denies issues with ejaculation or penetration when he has an erection.  He denies any lowering of his libido.  He has never been on treatment before.    Hypertension  This is a chronic problem. The current episode started more  "than 1 month ago. The problem is unchanged. The problem is uncontrolled. Pertinent negatives include no anxiety, blurred vision, chest pain, headaches, orthopnea, palpitations, shortness of breath or sweats. There are no associated agents to hypertension. Risk factors for coronary artery disease include male gender. Past treatments include ACE inhibitors. The current treatment provides mild improvement. There are no compliance problems.  Identifiable causes of hypertension include sleep apnea.   Erectile Dysfunction  The current episode started more than 1 month ago. The problem has been waxing and waning since onset. The nature of his difficulty is maintaining erection. He reports no anxiety, decreased libido or performance anxiety. Irritative symptoms do not include frequency, nocturia or urgency. Obstructive symptoms do not include dribbling, incomplete emptying, an intermittent stream, a slower stream, straining or a weak stream. Pertinent negatives include no chills, dysuria, genital pain, hematuria, hesitancy or inability to urinate. Past treatments include nothing. Risk factors include no AM erections and hypertension.     Review of Systems   Constitutional:  Negative for chills.   Eyes:  Negative for blurred vision.   Respiratory:  Negative for shortness of breath.    Cardiovascular:  Negative for chest pain, palpitations and orthopnea.   Genitourinary:  Negative for decreased libido, dysuria, frequency, hematuria, hesitancy, incomplete emptying, nocturia and urgency.   Neurological:  Negative for headaches.   Psychiatric/Behavioral:  The patient is not nervous/anxious.        Objective   /90   Pulse 71   Temp 97.9 °F (36.6 °C) (Temporal)   Ht 6' 6\" (1.981 m)   Wt 124 kg (273 lb)   SpO2 98%   BMI 31.55 kg/m²      Physical Exam  Vitals reviewed.   Constitutional:       General: He is not in acute distress.     Appearance: Normal appearance. He is well-developed.   HENT:      Head: Normocephalic " and atraumatic.      Right Ear: External ear normal.      Left Ear: External ear normal.      Nose: Nose normal.      Mouth/Throat:      Mouth: Mucous membranes are moist.      Pharynx: Oropharynx is clear.     Eyes:      Extraocular Movements: Extraocular movements intact.      Conjunctiva/sclera: Conjunctivae normal.       Cardiovascular:      Rate and Rhythm: Normal rate and regular rhythm.      Heart sounds: Normal heart sounds. No murmur heard.  Pulmonary:      Effort: Pulmonary effort is normal. No respiratory distress.      Breath sounds: Normal breath sounds.   Abdominal:      General: Abdomen is flat. Bowel sounds are normal.      Palpations: Abdomen is soft.      Tenderness: There is no abdominal tenderness.     Musculoskeletal:         General: No swelling.      Right lower leg: No edema.      Left lower leg: No edema.     Skin:     General: Skin is warm and dry.      Capillary Refill: Capillary refill takes less than 2 seconds.     Neurological:      General: No focal deficit present.      Mental Status: He is alert and oriented to person, place, and time.     Psychiatric:         Mood and Affect: Mood normal.         Behavior: Behavior normal.

## 2025-05-16 NOTE — ASSESSMENT & PLAN NOTE
Reports worsening erectile dysfunction  Libido unchanged  Ejaculation not affected  Does not recall presence of spontaneous erections  Does not believe that stress is playing a part    Plan:  -CBC, TSH, CMP, Lipid panel, total free testosterone  -Management pending workup    Orders:    CBC and Platelet; Future    TSH, 3rd generation with Free T4 reflex; Future    Comprehensive metabolic panel; Future    Lipid panel; Future    Testosterone, free, total; Future

## 2025-05-24 ENCOUNTER — APPOINTMENT (OUTPATIENT)
Dept: LAB | Age: 45
End: 2025-05-24
Payer: COMMERCIAL

## 2025-05-24 DIAGNOSIS — N52.8 OTHER MALE ERECTILE DYSFUNCTION: ICD-10-CM

## 2025-05-24 DIAGNOSIS — Z00.6 ENCOUNTER FOR EXAMINATION FOR NORMAL COMPARISON OR CONTROL IN CLINICAL RESEARCH PROGRAM: ICD-10-CM

## 2025-05-24 DIAGNOSIS — I10 PRIMARY HYPERTENSION: ICD-10-CM

## 2025-05-24 LAB
ALBUMIN SERPL BCG-MCNC: 4.3 G/DL (ref 3.5–5)
ALP SERPL-CCNC: 75 U/L (ref 34–104)
ALT SERPL W P-5'-P-CCNC: 12 U/L (ref 7–52)
ANION GAP SERPL CALCULATED.3IONS-SCNC: 10 MMOL/L (ref 4–13)
AST SERPL W P-5'-P-CCNC: 16 U/L (ref 13–39)
BILIRUB SERPL-MCNC: 1.01 MG/DL (ref 0.2–1)
BUN SERPL-MCNC: 13 MG/DL (ref 5–25)
CALCIUM SERPL-MCNC: 9.3 MG/DL (ref 8.4–10.2)
CHLORIDE SERPL-SCNC: 104 MMOL/L (ref 96–108)
CHOLEST SERPL-MCNC: 112 MG/DL (ref ?–200)
CO2 SERPL-SCNC: 27 MMOL/L (ref 21–32)
CREAT SERPL-MCNC: 0.98 MG/DL (ref 0.6–1.3)
ERYTHROCYTE [DISTWIDTH] IN BLOOD BY AUTOMATED COUNT: 12.2 % (ref 11.6–15.1)
GFR SERPL CREATININE-BSD FRML MDRD: 92 ML/MIN/1.73SQ M
GLUCOSE P FAST SERPL-MCNC: 92 MG/DL (ref 65–99)
HCT VFR BLD AUTO: 45.5 % (ref 36.5–49.3)
HDLC SERPL-MCNC: 23 MG/DL
HGB BLD-MCNC: 15.5 G/DL (ref 12–17)
LDLC SERPL CALC-MCNC: 67 MG/DL (ref 0–100)
MCH RBC QN AUTO: 29.7 PG (ref 26.8–34.3)
MCHC RBC AUTO-ENTMCNC: 34.1 G/DL (ref 31.4–37.4)
MCV RBC AUTO: 87 FL (ref 82–98)
NONHDLC SERPL-MCNC: 89 MG/DL
PLATELET # BLD AUTO: 224 THOUSANDS/UL (ref 149–390)
PMV BLD AUTO: 11.3 FL (ref 8.9–12.7)
POTASSIUM SERPL-SCNC: 3.8 MMOL/L (ref 3.5–5.3)
PROT SERPL-MCNC: 7.4 G/DL (ref 6.4–8.4)
RBC # BLD AUTO: 5.22 MILLION/UL (ref 3.88–5.62)
SODIUM SERPL-SCNC: 141 MMOL/L (ref 135–147)
TRIGL SERPL-MCNC: 112 MG/DL (ref ?–150)
TSH SERPL DL<=0.05 MIU/L-ACNC: 0.76 UIU/ML (ref 0.45–4.5)
WBC # BLD AUTO: 8.57 THOUSAND/UL (ref 4.31–10.16)

## 2025-05-24 PROCEDURE — 36415 COLL VENOUS BLD VENIPUNCTURE: CPT

## 2025-05-24 PROCEDURE — 85027 COMPLETE CBC AUTOMATED: CPT

## 2025-05-24 PROCEDURE — 80053 COMPREHEN METABOLIC PANEL: CPT

## 2025-05-24 PROCEDURE — 84443 ASSAY THYROID STIM HORMONE: CPT

## 2025-05-24 PROCEDURE — 80061 LIPID PANEL: CPT

## 2025-05-25 ENCOUNTER — APPOINTMENT (OUTPATIENT)
Dept: LAB | Age: 45
End: 2025-05-25
Payer: COMMERCIAL

## 2025-05-26 ENCOUNTER — NURSE TRIAGE (OUTPATIENT)
Dept: OTHER | Facility: OTHER | Age: 45
End: 2025-05-26

## 2025-05-26 NOTE — TELEPHONE ENCOUNTER
"  FOLLOW UP: None needed at this time    REASON FOR CONVERSATION: Advice Only and Itching    SYMPTOMS: hives on bilateral arms after getting blood work done.     OTHER: home care advice provided and discussed red flags    DISPOSITION: Home Care      Reason for Disposition   Widespread hives    Answer Assessment - Initial Assessment Questions  1. NAME of MEDICINE: \"What medicine(s) are you calling about?\"        Patient had blood work done on Saturday and had tape/gauze placed over site. Patient had blood work done on Sunday and had the same person who huong blood. Hives worsened in the last 24 hours.     2. QUESTION: \"What is your question?\" (e.g., double dose of medicine, side effect)        Can I take benadryl with lisinopril    3. PRESCRIBER: \"Who prescribed the medicine?\" Reason: if prescribed by specialist, call should be referred to that group.        Cezar Ortiz DO    4. SYMPTOMS: \"Do you have any symptoms?\" If Yes, ask: \"What symptoms are you having?\"  \"How bad are the symptoms (e.g., mild, moderate, severe)    Hives bilaterally and across his body which are itchy    Protocols used: Medication Question Call-Adult-, Hives-Adult-    "

## 2025-06-04 ENCOUNTER — RESULTS FOLLOW-UP (OUTPATIENT)
Dept: FAMILY MEDICINE CLINIC | Facility: CLINIC | Age: 45
End: 2025-06-04

## 2025-06-04 DIAGNOSIS — R79.89 LOW SERUM TESTOSTERONE LEVEL IN MALE: Primary | ICD-10-CM

## 2025-06-06 LAB
APOB+LDLR+PCSK9 GENE MUT ANL BLD/T: NOT DETECTED
BRCA1+BRCA2 DEL+DUP + FULL MUT ANL BLD/T: NOT DETECTED
MLH1+MSH2+MSH6+PMS2 GN DEL+DUP+FUL M: NOT DETECTED

## 2025-06-07 ENCOUNTER — APPOINTMENT (OUTPATIENT)
Dept: LAB | Age: 45
End: 2025-06-07
Payer: COMMERCIAL

## 2025-06-07 DIAGNOSIS — R79.89 LOW SERUM TESTOSTERONE LEVEL IN MALE: ICD-10-CM

## 2025-06-07 LAB
FSH SERPL-ACNC: 8.2 MIU/ML (ref 1.3–19.3)
LH SERPL-ACNC: 5.6 MIU/ML (ref 1.2–8.6)

## 2025-06-07 PROCEDURE — 84402 ASSAY OF FREE TESTOSTERONE: CPT

## 2025-06-07 PROCEDURE — 84403 ASSAY OF TOTAL TESTOSTERONE: CPT

## 2025-06-07 PROCEDURE — 83001 ASSAY OF GONADOTROPIN (FSH): CPT

## 2025-06-07 PROCEDURE — 83002 ASSAY OF GONADOTROPIN (LH): CPT

## 2025-06-07 PROCEDURE — 36415 COLL VENOUS BLD VENIPUNCTURE: CPT

## 2025-06-10 LAB
TESTOST FREE SERPL-MCNC: 45.4 PG/ML (ref 6.8–21.5)
TESTOST SERPL-MCNC: 276 NG/DL (ref 264–916)

## 2025-06-14 ENCOUNTER — RESULTS FOLLOW-UP (OUTPATIENT)
Dept: OTHER | Facility: HOSPITAL | Age: 45
End: 2025-06-14

## (undated) DEVICE — DRESSING XEROFORM 5 X 9

## (undated) DEVICE — SUT MONOCRYL 4-0 PS-2 18 IN Y496G

## (undated) DEVICE — HIGH PERFORMANCE GUIDEWIRE: Brand: DREAMWIRE

## (undated) DEVICE — SUT PROLENE 2-0 CT-2 30 IN 8411H

## (undated) DEVICE — 3000CC GUARDIAN II: Brand: GUARDIAN

## (undated) DEVICE — ERCP CANNULA: Brand: RX ERCP CANNULA

## (undated) DEVICE — JP CHAN DRN SIL RND 10FR FULL W/TROCAR: Brand: JACKSON-PRATT

## (undated) DEVICE — SUT PROLENE 1 CT-1 30 IN 8425H

## (undated) DEVICE — GAUZE SPONGES,USP TYPE VII GAUZE, 12 PLY: Brand: CURITY

## (undated) DEVICE — ABDOMINAL PAD: Brand: DERMACEA

## (undated) DEVICE — INTENDED FOR TISSUE SEPARATION, AND OTHER PROCEDURES THAT REQUIRE A SHARP SURGICAL BLADE TO PUNCTURE OR CUT.: Brand: BARD-PARKER SAFETY BLADES SIZE 11, STERILE

## (undated) DEVICE — ENDOPATH PNEUMONEEDLE INSUFFLATION NEEDLES WITH LUER LOCK CONNECTORS 120MM: Brand: ENDOPATH

## (undated) DEVICE — SUT VICRYL 0 UR-6 27 IN J603H

## (undated) DEVICE — CHLORAPREP HI-LITE 26ML ORANGE

## (undated) DEVICE — NEEDLE 25GA X 1 IN SAFETY GLIDE

## (undated) DEVICE — ADHESIVE SKN CLSR HISTOACRYL FLEX 0.5ML LF

## (undated) DEVICE — GLOVE INDICATOR PI UNDERGLOVE SZ 7 BLUE

## (undated) DEVICE — AEM CORD

## (undated) DEVICE — SCD SEQUENTIAL COMPRESSION COMFORT SLEEVE MEDIUM KNEE LENGTH: Brand: KENDALL SCD

## (undated) DEVICE — GLOVE SRG BIOGEL 6.5

## (undated) DEVICE — JACKSON-PRATT 100CC BULB RESERVOIR: Brand: CARDINAL HEALTH

## (undated) DEVICE — PACK PBDS LAP CHOLE RF

## (undated) DEVICE — FLOSEAL MATRIX IS INDICATED IN SURGICAL PROCEDURES (OTHER THAN IN OPHTHALMIC) AS AN ADJUNCT TO HEMOSTASIS WHEN CONTROL OF BLEEDING BY LIGATURE OR CONVENTIONALPROCEDURES IS INEFFECTIVE OR IMPRACTICAL.: Brand: FLOSEAL HEMOSTATIC MATRIX

## (undated) DEVICE — LIGAMAX 5 MM ENDOSCOPIC MULTIPLE CLIP APPLIER: Brand: LIGAMAX

## (undated) DEVICE — 3M™ TEGADERM™ TRANSPARENT FILM DRESSING FRAME STYLE, 1624W, 2-3/8 IN X 2-3/4 IN (6 CM X 7 CM), 100/CT 4CT/CASE: Brand: 3M™ TEGADERM™

## (undated) DEVICE — ANTIBACTERIAL UNDYED BRAIDED (POLYGLACTIN 910), SYNTHETIC ABSORBABLE SUTURE: Brand: COATED VICRYL

## (undated) DEVICE — ENDOPATH XCEL UNIVERSAL TROCAR STABLILITY SLEEVES: Brand: ENDOPATH XCEL

## (undated) DEVICE — ENDOPATH XCEL BLADELESS TROCARS WITH STABILITY SLEEVES: Brand: ENDOPATH XCEL